# Patient Record
Sex: FEMALE | Race: BLACK OR AFRICAN AMERICAN | Employment: OTHER | URBAN - METROPOLITAN AREA
[De-identification: names, ages, dates, MRNs, and addresses within clinical notes are randomized per-mention and may not be internally consistent; named-entity substitution may affect disease eponyms.]

---

## 2017-06-29 ENCOUNTER — APPOINTMENT (OUTPATIENT)
Dept: CT IMAGING | Age: 72
End: 2017-06-29
Attending: EMERGENCY MEDICINE
Payer: MEDICARE

## 2017-06-29 ENCOUNTER — HOSPITAL ENCOUNTER (EMERGENCY)
Age: 72
Discharge: HOME | End: 2017-06-29
Attending: EMERGENCY MEDICINE
Payer: MEDICARE

## 2017-06-29 ENCOUNTER — APPOINTMENT (OUTPATIENT)
Dept: GENERAL RADIOLOGY | Age: 72
End: 2017-06-29
Attending: EMERGENCY MEDICINE
Payer: MEDICARE

## 2017-06-29 VITALS
SYSTOLIC BLOOD PRESSURE: 157 MMHG | TEMPERATURE: 97.8 F | RESPIRATION RATE: 20 BRPM | DIASTOLIC BLOOD PRESSURE: 86 MMHG | HEART RATE: 72 BPM | OXYGEN SATURATION: 98 %

## 2017-06-29 DIAGNOSIS — R06.4 HYPERVENTILATION: Primary | ICD-10-CM

## 2017-06-29 LAB
ALBUMIN SERPL BCP-MCNC: 3.8 G/DL (ref 3.4–5)
ALBUMIN/GLOB SERPL: 0.9 {RATIO} (ref 0.8–1.7)
ALP SERPL-CCNC: 62 U/L (ref 45–117)
ALT SERPL-CCNC: 14 U/L (ref 13–56)
ANION GAP BLD CALC-SCNC: 12 MMOL/L (ref 3–18)
APPEARANCE UR: CLEAR
APTT PPP: 30.9 SEC (ref 23–36.4)
ARTERIAL PATENCY WRIST A: YES
AST SERPL W P-5'-P-CCNC: 16 U/L (ref 15–37)
ATRIAL RATE: 47 BPM
BACTERIA URNS QL MICRO: ABNORMAL /HPF
BASE DEFICIT BLD-SCNC: 5 MMOL/L
BASOPHILS # BLD AUTO: 0 K/UL (ref 0–0.06)
BASOPHILS # BLD: 1 % (ref 0–2)
BDY SITE: ABNORMAL
BILIRUB SERPL-MCNC: 0.4 MG/DL (ref 0.2–1)
BILIRUB UR QL: NEGATIVE
BNP SERPL-MCNC: 21 PG/ML (ref 0–900)
BUN SERPL-MCNC: 17 MG/DL (ref 7–18)
BUN/CREAT SERPL: 21 (ref 12–20)
CALCIUM SERPL-MCNC: 9.4 MG/DL (ref 8.5–10.1)
CALCULATED R AXIS, ECG10: 2 DEGREES
CALCULATED T AXIS, ECG11: 18 DEGREES
CHLORIDE SERPL-SCNC: 106 MMOL/L (ref 100–108)
CK MB CFR SERPL CALC: NORMAL % (ref 0–4)
CK MB SERPL-MCNC: <1 NG/ML (ref 5–25)
CK SERPL-CCNC: 84 U/L (ref 26–192)
CO2 SERPL-SCNC: 22 MMOL/L (ref 21–32)
COLOR UR: YELLOW
CREAT SERPL-MCNC: 0.81 MG/DL (ref 0.6–1.3)
D DIMER PPP FEU-MCNC: 0.95 UG/ML(FEU)
DIAGNOSIS, 93000: NORMAL
DIFFERENTIAL METHOD BLD: ABNORMAL
EOSINOPHIL # BLD: 0.3 K/UL (ref 0–0.4)
EOSINOPHIL NFR BLD: 5 % (ref 0–5)
EPITH CASTS URNS QL MICRO: ABNORMAL /LPF (ref 0–5)
ERYTHROCYTE [DISTWIDTH] IN BLOOD BY AUTOMATED COUNT: 14.8 % (ref 11.6–14.5)
GAS FLOW.O2 O2 DELIVERY SYS: ABNORMAL L/MIN
GLOBULIN SER CALC-MCNC: 4.2 G/DL (ref 2–4)
GLUCOSE SERPL-MCNC: 80 MG/DL (ref 74–99)
GLUCOSE UR STRIP.AUTO-MCNC: NEGATIVE MG/DL
HCO3 BLD-SCNC: 17.7 MMOL/L (ref 22–26)
HCT VFR BLD AUTO: 38.4 % (ref 35–45)
HGB BLD-MCNC: 12.8 G/DL (ref 12–16)
HGB UR QL STRIP: NEGATIVE
INR PPP: 1 (ref 0.8–1.2)
KETONES UR QL STRIP.AUTO: NEGATIVE MG/DL
LEUKOCYTE ESTERASE UR QL STRIP.AUTO: ABNORMAL
LIPASE SERPL-CCNC: 191 U/L (ref 73–393)
LYMPHOCYTES # BLD AUTO: 31 % (ref 21–52)
LYMPHOCYTES # BLD: 2 K/UL (ref 0.9–3.6)
MCH RBC QN AUTO: 28.4 PG (ref 24–34)
MCHC RBC AUTO-ENTMCNC: 33.3 G/DL (ref 31–37)
MCV RBC AUTO: 85.3 FL (ref 74–97)
MONOCYTES # BLD: 0.6 K/UL (ref 0.05–1.2)
MONOCYTES NFR BLD AUTO: 9 % (ref 3–10)
NEUTS SEG # BLD: 3.6 K/UL (ref 1.8–8)
NEUTS SEG NFR BLD AUTO: 54 % (ref 40–73)
NITRITE UR QL STRIP.AUTO: NEGATIVE
O2/TOTAL GAS SETTING VFR VENT: 21 %
PCO2 BLD: 22 MMHG (ref 35–45)
PH BLD: 7.51 [PH] (ref 7.35–7.45)
PH UR STRIP: 6.5 [PH] (ref 5–8)
PLATELET # BLD AUTO: 250 K/UL (ref 135–420)
PMV BLD AUTO: 10.3 FL (ref 9.2–11.8)
PO2 BLD: 115 MMHG (ref 80–100)
POTASSIUM SERPL-SCNC: 4.2 MMOL/L (ref 3.5–5.5)
PROT SERPL-MCNC: 8 G/DL (ref 6.4–8.2)
PROT UR STRIP-MCNC: NEGATIVE MG/DL
PROTHROMBIN TIME: 13.2 SEC (ref 11.5–15.2)
Q-T INTERVAL, ECG07: 496 MS
QRS DURATION, ECG06: 72 MS
QTC CALCULATION (BEZET), ECG08: 434 MS
RBC # BLD AUTO: 4.5 M/UL (ref 4.2–5.3)
RBC #/AREA URNS HPF: NEGATIVE /HPF (ref 0–5)
SAO2 % BLD: 99 % (ref 92–97)
SERVICE CMNT-IMP: ABNORMAL
SODIUM SERPL-SCNC: 140 MMOL/L (ref 136–145)
SP GR UR REFRACTOMETRY: <1.005 (ref 1–1.03)
SPECIMEN TYPE: ABNORMAL
TOTAL RESP. RATE, ITRR: 12
TROPONIN I SERPL-MCNC: <0.02 NG/ML (ref 0–0.06)
UROBILINOGEN UR QL STRIP.AUTO: 0.2 EU/DL (ref 0.2–1)
VENTRICULAR RATE, ECG03: 46 BPM
WBC # BLD AUTO: 6.5 K/UL (ref 4.6–13.2)
WBC URNS QL MICRO: ABNORMAL /HPF (ref 0–5)

## 2017-06-29 PROCEDURE — 71275 CT ANGIOGRAPHY CHEST: CPT

## 2017-06-29 PROCEDURE — 85379 FIBRIN DEGRADATION QUANT: CPT | Performed by: EMERGENCY MEDICINE

## 2017-06-29 PROCEDURE — 96374 THER/PROPH/DIAG INJ IV PUSH: CPT

## 2017-06-29 PROCEDURE — 74011250636 HC RX REV CODE- 250/636

## 2017-06-29 PROCEDURE — 81001 URINALYSIS AUTO W/SCOPE: CPT | Performed by: EMERGENCY MEDICINE

## 2017-06-29 PROCEDURE — 36600 WITHDRAWAL OF ARTERIAL BLOOD: CPT

## 2017-06-29 PROCEDURE — 85025 COMPLETE CBC W/AUTO DIFF WBC: CPT | Performed by: EMERGENCY MEDICINE

## 2017-06-29 PROCEDURE — 96375 TX/PRO/DX INJ NEW DRUG ADDON: CPT

## 2017-06-29 PROCEDURE — 82550 ASSAY OF CK (CPK): CPT | Performed by: EMERGENCY MEDICINE

## 2017-06-29 PROCEDURE — 93005 ELECTROCARDIOGRAM TRACING: CPT

## 2017-06-29 PROCEDURE — 80053 COMPREHEN METABOLIC PANEL: CPT | Performed by: EMERGENCY MEDICINE

## 2017-06-29 PROCEDURE — 85610 PROTHROMBIN TIME: CPT | Performed by: EMERGENCY MEDICINE

## 2017-06-29 PROCEDURE — 85730 THROMBOPLASTIN TIME PARTIAL: CPT | Performed by: EMERGENCY MEDICINE

## 2017-06-29 PROCEDURE — 83880 ASSAY OF NATRIURETIC PEPTIDE: CPT | Performed by: EMERGENCY MEDICINE

## 2017-06-29 PROCEDURE — 83690 ASSAY OF LIPASE: CPT | Performed by: EMERGENCY MEDICINE

## 2017-06-29 PROCEDURE — 96376 TX/PRO/DX INJ SAME DRUG ADON: CPT

## 2017-06-29 PROCEDURE — 74011250636 HC RX REV CODE- 250/636: Performed by: EMERGENCY MEDICINE

## 2017-06-29 PROCEDURE — 71010 XR CHEST PORT: CPT

## 2017-06-29 PROCEDURE — 99285 EMERGENCY DEPT VISIT HI MDM: CPT

## 2017-06-29 PROCEDURE — 82803 BLOOD GASES ANY COMBINATION: CPT

## 2017-06-29 PROCEDURE — 74011636320 HC RX REV CODE- 636/320: Performed by: EMERGENCY MEDICINE

## 2017-06-29 RX ORDER — SODIUM CHLORIDE 0.9 % (FLUSH) 0.9 %
5-10 SYRINGE (ML) INJECTION AS NEEDED
Status: DISCONTINUED | OUTPATIENT
Start: 2017-06-29 | End: 2017-06-30 | Stop reason: HOSPADM

## 2017-06-29 RX ORDER — LORAZEPAM 2 MG/ML
0.5 INJECTION INTRAMUSCULAR ONCE
Status: COMPLETED | OUTPATIENT
Start: 2017-06-29 | End: 2017-06-29

## 2017-06-29 RX ORDER — KETOROLAC TROMETHAMINE 30 MG/ML
15 INJECTION, SOLUTION INTRAMUSCULAR; INTRAVENOUS
Status: COMPLETED | OUTPATIENT
Start: 2017-06-29 | End: 2017-06-29

## 2017-06-29 RX ORDER — SODIUM CHLORIDE 0.9 % (FLUSH) 0.9 %
5-10 SYRINGE (ML) INJECTION EVERY 8 HOURS
Status: DISCONTINUED | OUTPATIENT
Start: 2017-06-29 | End: 2017-06-30 | Stop reason: HOSPADM

## 2017-06-29 RX ORDER — LORAZEPAM 2 MG/ML
1 INJECTION INTRAMUSCULAR
Status: COMPLETED | OUTPATIENT
Start: 2017-06-29 | End: 2017-06-29

## 2017-06-29 RX ORDER — LORAZEPAM 2 MG/ML
0.5 INJECTION INTRAMUSCULAR
Status: DISCONTINUED | OUTPATIENT
Start: 2017-06-29 | End: 2017-06-29

## 2017-06-29 RX ORDER — ALPRAZOLAM 0.25 MG/1
0.25 TABLET ORAL
Qty: 8 TAB | Refills: 0 | Status: SHIPPED | OUTPATIENT
Start: 2017-06-29 | End: 2021-09-15

## 2017-06-29 RX ORDER — LORAZEPAM 2 MG/ML
INJECTION INTRAMUSCULAR
Status: COMPLETED
Start: 2017-06-29 | End: 2017-06-29

## 2017-06-29 RX ADMIN — KETOROLAC TROMETHAMINE 15 MG: 30 INJECTION, SOLUTION INTRAMUSCULAR at 19:24

## 2017-06-29 RX ADMIN — LORAZEPAM 0.5 MG: 2 INJECTION INTRAMUSCULAR; INTRAVENOUS at 16:56

## 2017-06-29 RX ADMIN — LORAZEPAM 1 MG: 2 INJECTION INTRAMUSCULAR; INTRAVENOUS at 19:24

## 2017-06-29 RX ADMIN — LORAZEPAM 0.5 MG: 2 INJECTION INTRAMUSCULAR at 16:56

## 2017-06-29 RX ADMIN — Medication 10 ML: at 16:57

## 2017-06-29 RX ADMIN — IOPAMIDOL 100 ML: 755 INJECTION, SOLUTION INTRAVENOUS at 20:41

## 2017-06-29 NOTE — ED NOTES
Report received from [facility] assumed care of pt. Pt resting comfortably in stretcher with daughter at bedside. Updated pt on plan of care awaiting test results. Pt verbalized understanding, warm blankets provided to pt for comfort, bed in lowest lock position, side rails up x2, call bell in reach of pt and pt instructed to use call bell for assistance.

## 2017-06-29 NOTE — ED PROVIDER NOTES
[address]  EMERGENCY DEPARTMENT HISTORY AND PHYSICAL EXAM       Date: 6/29/2017   Patient Name: Adam Jones   YOB: 1945  Medical Record Number: [id number]    History of Presenting Illness     Chief Complaint   Patient presents with    Shortness of Breath        History Provided By:  patient    Additional History:   4:40 PM  Adam Jones is a 80 y.o. female with PMHX COPD, asthma, and arthritis presenting to the ED C/O intermittent SOB x1 month (O2 sats in ED 93 % on RA), onset 1 month ago. Associated sxs include intermittent sternal CP and LUQ pain (each episode lasts 2-3 minutes) x1 month. PSHX hernia repair, appendectomy, cholecystectomy. Pt states she has been out of her COPD medications x1 week because her doctor in [place] (who she saw 1 month ago while visiting her granddaughter) said \"I don't need them no more. \" Pt denies PMHX DM, HTN, leg pain, and any other symptoms or complaints. Primary Care Provider: Rosalva Scott MD   Specialist:    Past History     Past Medical History:   Past Medical History:   Diagnosis Date    Asthma     Chronic obstructive pulmonary disease ([facility] [address]     GERD (gastroesophageal reflux disease)     Hypertension     4-2015- no meds    Ill-defined condition     seasonal allergies    [facility]rheumatoid [facility]) ([facility])         Past Surgical History:   Past Surgical History:   Procedure Laterality Date    HX APPENDECTOMY      HX CATARACT REMOVAL Bilateral     HX CHOLECYSTECTOMY      HX HERNIA REPAIR      ventral    HX MOHS PROCEDURES Right     HX ORTHOPAEDIC Bilateral     knee replacement    HX TONSILLECTOMY          Family History:   History reviewed. No pertinent family history. Social History:   Social History   Substance Use Topics    Smoking status: Never Smoker    Smokeless tobacco: Never Used    Alcohol use No        Allergies:    Allergies   Allergen Reactions    Latex Anaphylaxis    Aspirin Anaphylaxis Patient has tolerated with NSAIDS    Sulfa (Sulfonamide Antibiotics) Anaphylaxis    Egg Swelling     Eye swelling    Iodinated Contrast- Oral And Iv Dye Unknown (comments)    Pcn [Penicillins] Swelling        Review of Systems   Review of Systems   Respiratory: Positive for shortness of breath. Cardiovascular: Positive for chest pain (sternal). Gastrointestinal: Positive for abdominal pain (LUQ). Musculoskeletal: Negative for myalgias (BLE). All other systems reviewed and are negative. Physical Exam  Vitals:    06/29/17 1730 06/29/17 1903 06/29/17 1940 06/29/17 1941   BP: 149/77 157/86     Pulse: 61 (!) 50 66 61   Resp: 26 13 19 23   Temp:       SpO2:   94% 98%       Physical Exam   Constitutional: She is oriented to person, place, and time. She appears well-developed and well-nourished. Elderly female, appears to be hyperventilating   HENT:   Head: Normocephalic and atraumatic. Eyes: Pupils are equal, round, and reactive to light. Neck: Neck supple. Cardiovascular: Regular rhythm, S1 normal, S2 normal and normal heart sounds. Bradycardia present. S1-S2 normal but sometimes bradycardic during exam   Pulmonary/Chest: Breath sounds normal. No respiratory distress. She has no wheezes. She has no rales. She exhibits no tenderness. Abdominal: Soft. She exhibits no distension and no mass. There is tenderness in the left upper quadrant. There is no rebound and no guarding. Musculoskeletal: Normal range of motion. She exhibits no edema or tenderness. Neurological: She is alert and oriented to person, place, and time. No cranial nerve deficit. Skin: No rash noted. Psychiatric: She has a normal mood and affect. Her behavior is normal. Thought content normal.   Nursing note and vitals reviewed.        Diagnostic Study Results     Labs -      Recent Results (from the past 12 hour(s))   CBC WITH AUTOMATED DIFF    Collection Time: 06/29/17  4:50 PM   Result Value Ref Range    WBC 6.5 4.6 - 13.2 K/uL    RBC 4.50 4. 20 - 5.30 M/uL    HGB 12.8 12.0 - 16.0 g/dL    HCT 38.4 35.0 - 45.0 %    MCV 85.3 74.0 - 97.0 FL    MCH 28.4 24.0 - 34.0 PG    MCHC 33.3 31.0 - 37.0 g/dL    RDW 14.8 (H) 11.6 - 14.5 %    PLATELET 095 267 - 882 K/uL    MPV 10.3 9.2 - 11.8 FL    NEUTROPHILS 54 40 - 73 %    LYMPHOCYTES 31 21 - 52 %    MONOCYTES 9 3 - 10 %    EOSINOPHILS 5 0 - 5 %    BASOPHILS 1 0 - 2 %    ABS. NEUTROPHILS 3.6 1.8 - 8.0 K/UL    ABS. LYMPHOCYTES 2.0 0.9 - 3.6 K/UL    ABS. MONOCYTES 0.6 0.05 - 1.2 K/UL    ABS. EOSINOPHILS 0.3 0.0 - 0.4 K/UL    ABS. BASOPHILS 0.0 0.0 - 0.06 K/UL    DF AUTOMATED     EKG, 12 LEAD, INITIAL    Collection Time: 06/29/17  4:52 PM   Result Value Ref Range    Ventricular Rate 46 BPM    Atrial Rate 47 BPM    QRS Duration 72 ms    Q-T Interval 496 ms    QTC Calculation (Bezet) 434 ms    Calculated R Axis 2 degrees    Calculated T Axis 18 degrees    Diagnosis       Poor data quality, interpretation may be adversely affected  Sinus rhythm  Voltage criteria for left ventricular hypertrophy  Nonspecific ST and T wave abnormality  Abnormal ECG  When compared with ECG of 22-JUN-2015 18:12,  Nonspecific T wave abnormality now evident in Lateral leads  Confirmed by Kamron Green MD. ([id number]) on 6/29/2017 6:09:26 PM     POC G3    Collection Time: 06/29/17  5:19 PM   Result Value Ref Range    Device: ROOM AIR      FIO2 (POC) 21 %    pH (POC) 7.513 (H) 7.35 - 7.45      pCO2 (POC) 22.0 (L) 35.0 - 45.0 MMHG    pO2 (POC) 115 (H) 80 - 100 MMHG    HCO3 (POC) 17.7 (L) 22 - 26 MMOL/L    sO2 (POC) 99 (H) 92 - 97 %    Base deficit (POC) 5 mmol/L    Allens test (POC) YES      Total resp.  rate 12      Site RIGHT BRACHIAL      Specimen type (POC) ARTERIAL      Performed by [address], COMPREHENSIVE    Collection Time: 06/29/17  5:50 PM   Result Value Ref Range    Sodium 140 136 - 145 mmol/L    Potassium 4.2 3.5 - 5.5 mmol/L    Chloride 106 100 - 108 mmol/L    CO2 22 21 - 32 mmol/L    Anion gap 12 3.0 - 18 mmol/L    Glucose 80 74 - 99 mg/dL    BUN 17 7.0 - 18 MG/DL    Creatinine 0.81 0.6 - 1.3 MG/DL    BUN/Creatinine ratio 21 (H) 12 - 20      GFR est AA >60 >60 ml/min/1.73m2    GFR est non-AA >60 >60 ml/min/1.73m2    Calcium 9.4 8.5 - 10.1 MG/DL    Bilirubin, total 0.4 0.2 - 1.0 MG/DL    ALT (SGPT) 14 13 - 56 U/L    AST (SGOT) 16 15 - 37 U/L    Alk.  phosphatase 62 45 - 117 U/L    Protein, total 8.0 6.4 - 8.2 g/dL    Albumin 3.8 3.4 - 5.0 g/dL    Globulin 4.2 (H) 2.0 - 4.0 g/dL    A-G Ratio 0.9 0.8 - 1.7     LIPASE    Collection Time: 06/29/17  5:50 PM   Result Value Ref Range    Lipase 191 73 - 393 U/L   PTT    Collection Time: 06/29/17  5:50 PM   Result Value Ref Range    aPTT 30.9 23.0 - 36.4 SEC   PROTHROMBIN TIME + INR    Collection Time: 06/29/17  5:50 PM   Result Value Ref Range    Prothrombin time 13.2 11.5 - 15.2 sec    INR 1.0 0.8 - 1.2     CARDIAC PANEL,(CK, CKMB & TROPONIN)    Collection Time: 06/29/17  5:50 PM   Result Value Ref Range    CK 84 26 - 192 U/L    CK - MB <1.0 <3.6 ng/ml    CK-MB Index  0.0 - 4.0 %     CALCULATION NOT PERFORMED WHEN RESULT IS BELOW LINEAR LIMIT    Troponin-I, Qt. <0.02 0.00 - 0.06 NG/ML   PRO-BNP    Collection Time: 06/29/17  5:50 PM   Result Value Ref Range    NT pro-BNP 21 0 - 900 PG/ML   D DIMER    Collection Time: 06/29/17  5:50 PM   Result Value Ref Range    D DIMER 0.95 (H) <0.46 ug/ml(FEU)   URINALYSIS W/ RFLX MICROSCOPIC    Collection Time: 06/29/17  6:55 PM   Result Value Ref Range    Color YELLOW      Appearance CLEAR      Specific gravity <1.005 (L) 1.005 - 1.030    pH (UA) 6.5 5.0 - 8.0      Protein NEGATIVE  NEG mg/dL    Glucose NEGATIVE  NEG mg/dL    Ketone NEGATIVE  NEG mg/dL    Bilirubin NEGATIVE  NEG      Blood NEGATIVE  NEG      Urobilinogen 0.2 0.2 - 1.0 EU/dL    Nitrites NEGATIVE  NEG      Leukocyte Esterase TRACE (A) NEG     URINE MICROSCOPIC ONLY    Collection Time: 06/29/17  6:55 PM   Result Value Ref Range    WBC 0 to 1 0 - 5 /hpf    RBC NEGATIVE  0 - 5 /hpf    Epithelial cells FEW 0 - 5 /lpf    Bacteria FEW (A) NEG /hpf       Radiologic Studies -    5:19 PM  RADIOLOGY FINDINGS  Chest X-ray shows NAP  Pending review by Radiologist  Recorded by Maddi Crawford ED Scribe, as dictated by [employer], MD      CTA CHEST W OR W WO CONT   Final Result   1. No central pulmonary embolism however evaluation for peripheral pulmonary  emboli to the lower lobes is limited due to breathing motion artifacts     2. No aortic dissection     There is intra and extrahepatic bile duct dilatation with dilatation of the  pancreatic duct. This area is incompletely evaluated. Dedicated CT of the  abdomen recommended for further evaluation    As read by the radiologist.      XR CHEST PORT    (Results Pending)        Medical Decision Making   I am the first provider for this patient. I reviewed the vital signs, available nursing notes, past medical history, past surgical history, family history and social history. Vital Signs-Reviewed the patient's vital signs. Patient Vitals for the past 12 hrs:   Temp Pulse Resp BP SpO2   06/29/17 1941 - 61 23 - 98 %   06/29/17 1940 - 66 19 - 94 %   06/29/17 1903 - (!) 50 13 157/86 -   06/29/17 1730 - 61 26 149/77 -   06/29/17 1715 - (!) 49 16 146/78 -   06/29/17 1638 97.8 °F (36.6 °C) (!) 55 14 (!) 150/99 -       DDX: SOB and CP r/o hyperventilation, PE, CHF, COPD exacerbation    Pulse Oximetry Analysis - Abnormal 88% on RA. Will put pt on 2L O2 NC. Cardiac Monitor:   Rate: 53 bpm  Rhythm: sinus bradycardia     EKG interpretation: (Preliminary)  4:52 PM  46 bpm, sinus bradycardia, voltage criteria for left ventricular hypertrophy, nonspecific ST and T wave abnormality  EKG read by Susan Servin MD at 4:52 PM     ED Course:     4:40 PM Initial assessment performed. 7:07 PM Pt very fidgety, still hyperventilating. States she has arthritis and fibromyalgia and is asking for something for pain. Will give Toradol. Medications Given in the ED:  Medications   sodium chloride (NS) flush 5-10 mL (10 mL IntraVENous Given 6/29/17 1657)   sodium chloride (NS) flush 5-10 mL (not administered)   LORazepam (ATIVAN) injection 0.5 mg (0.5 mg IntraVENous Given 6/29/17 1656)   LORazepam (ATIVAN) injection 1 mg (1 mg IntraVENous Given 6/29/17 1924)   ketorolac (TORADOL) injection 15 mg (15 mg IntraVENous Given 6/29/17 1924)   iopamidol (ISOVUE-370) 76 % injection 100 mL (100 mL IntraVENous Given 6/29/17 2041)        Discharge Note:  9:32 PM  Patients results have been reviewed with them. Patient and/or family have verbally conveyed their understanding and agreement of the patient's signs, symptoms, diagnosis, treatment and prognosis and additionally agree to follow up as recommended or return to the Emergency Room should their condition change prior to their follow-up appointment. Patient verbally agrees with the care-plan and verbally conveys that all of their questions have been answered. Discharge instructions have also been provided to the patient with some educational information regarding their diagnosis as well a list of reasons why they would want to return to the ER prior to their follow-up appointment should their condition change. Diagnosis   Clinical Impression:   1. Hyperventilation         Follow-up Information     Follow up With Details Comments Contact Info    Luz Maria Beyer MD Schedule an appointment as soon as possible for a visit in 2 days  [address] [place] [address]      THE [facility] EMERGENCY DEPT  As needed, If symptoms worsen [address] [place]ma Xavier  [phone]          Current Discharge Medication List      START taking these medications    Details   ALPRAZolam (XANAX) 0.25 mg tablet Take 1 Tab by mouth every eight (8) hours as needed for Anxiety. Max Daily Amount: 0.75 mg.   Qty: 8 Tab, Refills: 0         CONTINUE these medications which have NOT CHANGED    Details   esomeprazole (NEXIUM) 20 mg capsule Take 20 mg by mouth daily. Indications: GASTROESOPHAGEAL REFLUX      clonazePAM (KLONOPIN) 0.5 mg tablet Take 0.5 mg by mouth daily. Indications: anxiety      fexofenadine (ALLEGRA) 180 mg tablet Take 180 mg by mouth daily. Indications: ALLERGIC RHINITIS             _______________________________   Attestations:     SCRIBE ATTESTATION:  This note is prepared by Ann Marie Herman, acting as Scribe for Bry Jon MD.    PROVIDER ATTESTATION:  Bry Jon MD: The scribe's documentation has been prepared under my direction and personally reviewed by me in its entirety.  I confirm that the note above accurately reflects all work, treatment, procedures, and medical decision making performed by me.   _______________________________

## 2017-06-30 NOTE — ED NOTES
Pt returned for CT, pt resting comfortably on stretcher with daughter at bedside. Warm blankets provided for comfort. Bed in lowest locked position, side rails up x 2 and call bell in reach of patient and patient instructed to use call bell for any assistance needed. Pt remains on cardiac monitor and will continue to monitor.

## 2017-06-30 NOTE — ED NOTES
Pt was discharged in good and improved condition. The patient's diagnosis, condition and treatment were explained to patient and aftercare instructions were given. The patient verbalized good understanding. Patient armband removed and both labels and armband were placed in shred bin. Patient left ER ambulatory with steady gait in no acute distress. 1 prescriptions provided. Patient reports pain is currently 0 on numeric scale. Patient provided education about pain medication including dosage and side effects. Patient verbalized understanding. Ride daughter at bedside to provide transportation home.

## 2017-06-30 NOTE — DISCHARGE INSTRUCTIONS
Hyperventilation: Care Instructions  Your Care Instructions  Hyperventilation is breathing that is deeper and faster than normal. It causes the amount of carbon dioxide (CO2) in the blood to drop. This may make you feel lightheaded. You may also have a fast heartbeat and feel short of breath. It also can lead to numbness or tingling in the hands or feet, anxiety, fainting, and sore chest muscles. Some causes of sudden hyperventilation include anxiety, asthma, emphysema, a head injury, fever, and some medicines. Hyperventilation also may be caused by a pattern of incorrect breathing. It most often happens when a physical or emotional event makes this breathing pattern worse. Hyperventilation may happen during pregnancy. But it usually goes away on its own after delivery. In many cases, hyperventilation can be controlled by learning proper breathing techniques. Follow-up care is a key part of your treatment and safety. Be sure to make and go to all appointments, and call your doctor if you are having problems. It's also a good idea to know your test results and keep a list of the medicines you take. How can you care for yourself at home? Breathing methods  · Breathe through pursed lips, as if you are whistling. Or pinch one nostril and breathe through your nose. It is harder to hyperventilate through your nose or through pursed lips because you can't move as much air. · Slow your breathing to 1 breath every 5 seconds, or slow enough that symptoms gradually go away. · Try belly-breathing. This fills your lungs fully, slows your breathing rate, and helps you relax. ¨ Place one hand on your belly just below the ribs. Place the other hand on your chest. You can do this while standing, but it may be more comfortable while you lie on the floor with your knees bent. ¨ Take a deep breath through your nose. As you breathe in, let your belly push your hand out. Keep your chest still.   ¨ As you breathe out through pursed lips, feel your hand go down. Use the hand on your belly to help you push all the air out. Take your time breathing out. ¨ Repeat these steps 3 to 10 times. Take your time with each breath. Always try to control your breathing or to belly-breathe first. If these techniques don't work and you don't have other health problems, you might try breathing in and out of a paper bag. Using a paper bag  · Take 6 to 12 easy, natural breaths, with a small paper bag held over your mouth and nose. Then remove the bag from your nose and mouth, and take easy, natural breaths. · Next, try belly-breathing. · Switch between these techniques until your hyperventilation stops. Do not try this method if:  · You have any heart or lung problems. · Rapid breathing happens at a high altitude. Breathing faster than normal is a natural response to high altitude. Follow these safety measures when using this method:  · Do not use a plastic bag. · Do not breathe continuously into a paper bag. Take 6 to 12 natural breaths with a paper bag held over your mouth and nose. Then remove the bag from your nose and mouth. · Do not hold the bag for a person who is hyperventilating. Let the person hold the bag over his or her own mouth and nose. When should you call for help? Call 911 anytime you think you may need emergency care. For example, call if:  · You passed out (lost consciousness). Call your doctor now or seek immediate medical care if:  · You hyperventilate for longer than 30 minutes. · You hyperventilate often. · Your symptoms do not improve with home treatment. · Your symptoms become more severe or more frequent. Watch closely for changes in your health, and be sure to contact your doctor if you have any problems. Where can you learn more? Go to http://lida-caroline.info/. Enter I070 in the search box to learn more about \"Hyperventilation: Care Instructions. \"  Current as of: March 20, 2017  Content Version: 11.3  © 4379-8405 [employer]. Care instructions adapted under license by [employer] (which disclaims liability or warranty for this information). If you have questions about a medical condition or this instruction, always ask your healthcare professional. [address] any warranty or liability for your use of this information.

## 2021-01-21 ENCOUNTER — HOSPITAL ENCOUNTER (OUTPATIENT)
Dept: PREADMISSION TESTING | Age: 76
Discharge: HOME | End: 2021-01-21
Payer: MEDICARE

## 2021-01-21 PROCEDURE — U0003 INFECTIOUS AGENT DETECTION BY NUCLEIC ACID (DNA OR RNA); SEVERE ACUTE RESPIRATORY SYNDROME CORONAVIRUS 2 (SARS-COV-2) (CORONAVIRUS DISEASE [COVID-19]), AMPLIFIED PROBE TECHNIQUE, MAKING USE OF HIGH THROUGHPUT TECHNOLOGIES AS DESCRIBED BY CMS-2020-01-R: HCPCS

## 2021-01-22 LAB — SARS-COV-2, COV2NT: NOT DETECTED

## 2021-01-25 ENCOUNTER — HOSPITAL ENCOUNTER (OUTPATIENT)
Age: 76
Setting detail: OUTPATIENT SURGERY
Discharge: HOME | End: 2021-01-25
Attending: INTERNAL MEDICINE | Admitting: INTERNAL MEDICINE
Payer: MEDICARE

## 2021-01-25 VITALS
RESPIRATION RATE: 16 BRPM | SYSTOLIC BLOOD PRESSURE: 128 MMHG | HEIGHT: 65 IN | BODY MASS INDEX: 18.33 KG/M2 | WEIGHT: 110 LBS | HEART RATE: 58 BPM | OXYGEN SATURATION: 99 % | TEMPERATURE: 97.6 F | DIASTOLIC BLOOD PRESSURE: 69 MMHG

## 2021-01-25 PROCEDURE — 76040000008: Performed by: INTERNAL MEDICINE

## 2021-01-25 PROCEDURE — 77030039961 HC KT CUST COLON BSC -D: Performed by: INTERNAL MEDICINE

## 2021-01-25 PROCEDURE — 99153 MOD SED SAME PHYS/QHP EA: CPT | Performed by: INTERNAL MEDICINE

## 2021-01-25 PROCEDURE — 77030040361 HC SLV COMPR DVT MDII -B: Performed by: INTERNAL MEDICINE

## 2021-01-25 PROCEDURE — 77030013991 HC SNR POLYP ENDOSC BSC -A: Performed by: INTERNAL MEDICINE

## 2021-01-25 PROCEDURE — 2709999900 HC NON-CHARGEABLE SUPPLY: Performed by: INTERNAL MEDICINE

## 2021-01-25 PROCEDURE — 74011250636 HC RX REV CODE- 250/636: Performed by: INTERNAL MEDICINE

## 2021-01-25 PROCEDURE — 88305 TISSUE EXAM BY PATHOLOGIST: CPT

## 2021-01-25 PROCEDURE — G0500 MOD SEDAT ENDO SERVICE >5YRS: HCPCS | Performed by: INTERNAL MEDICINE

## 2021-01-25 RX ORDER — SODIUM CHLORIDE 0.9 % (FLUSH) 0.9 %
5-40 SYRINGE (ML) INJECTION EVERY 8 HOURS
Status: CANCELLED | OUTPATIENT
Start: 2021-01-25

## 2021-01-25 RX ORDER — ATROPINE SULFATE 0.1 MG/ML
0.5 INJECTION INTRAVENOUS
Status: CANCELLED | OUTPATIENT
Start: 2021-01-25 | End: 2021-01-26

## 2021-01-25 RX ORDER — MIDAZOLAM HYDROCHLORIDE 1 MG/ML
.25-5 INJECTION, SOLUTION INTRAMUSCULAR; INTRAVENOUS
Status: DISCONTINUED | OUTPATIENT
Start: 2021-01-25 | End: 2021-01-25 | Stop reason: HOSPADM

## 2021-01-25 RX ORDER — EPINEPHRINE 0.1 MG/ML
1 INJECTION INTRACARDIAC; INTRAVENOUS
Status: CANCELLED | OUTPATIENT
Start: 2021-01-25 | End: 2021-01-26

## 2021-01-25 RX ORDER — DIPHENHYDRAMINE HYDROCHLORIDE 50 MG/ML
50 INJECTION, SOLUTION INTRAMUSCULAR; INTRAVENOUS ONCE
Status: CANCELLED | OUTPATIENT
Start: 2021-01-25 | End: 2021-01-25

## 2021-01-25 RX ORDER — FENTANYL CITRATE 50 UG/ML
100 INJECTION, SOLUTION INTRAMUSCULAR; INTRAVENOUS
Status: DISCONTINUED | OUTPATIENT
Start: 2021-01-25 | End: 2021-01-25 | Stop reason: HOSPADM

## 2021-01-25 RX ORDER — FLUMAZENIL 0.1 MG/ML
0.2 INJECTION INTRAVENOUS
Status: CANCELLED | OUTPATIENT
Start: 2021-01-25 | End: 2021-01-25

## 2021-01-25 RX ORDER — NALOXONE HYDROCHLORIDE 0.4 MG/ML
0.4 INJECTION, SOLUTION INTRAMUSCULAR; INTRAVENOUS; SUBCUTANEOUS
Status: DISCONTINUED | OUTPATIENT
Start: 2021-01-25 | End: 2021-01-25 | Stop reason: HOSPADM

## 2021-01-25 RX ORDER — SODIUM CHLORIDE 0.9 % (FLUSH) 0.9 %
5-40 SYRINGE (ML) INJECTION AS NEEDED
Status: CANCELLED | OUTPATIENT
Start: 2021-01-25

## 2021-01-25 RX ORDER — SODIUM CHLORIDE 9 MG/ML
1000 INJECTION, SOLUTION INTRAVENOUS CONTINUOUS
Status: DISCONTINUED | OUTPATIENT
Start: 2021-01-25 | End: 2021-01-25 | Stop reason: HOSPADM

## 2021-01-25 RX ORDER — DEXTROMETHORPHAN/PSEUDOEPHED 2.5-7.5/.8
1.2 DROPS ORAL
Status: CANCELLED | OUTPATIENT
Start: 2021-01-25

## 2021-01-25 RX ADMIN — SODIUM CHLORIDE 1000 ML: 900 INJECTION, SOLUTION INTRAVENOUS at 13:32

## 2021-01-25 NOTE — PROCEDURES
[facility]  Colonoscopy Procedure Report  _______________________________________________________  Patient: Rafael Donald                                        Attending Physician: Royce Hu MD    Patient ID: [id number]                                    Referring Physician: Ellen De Oliveira MD    Exam Date: 1/25/2021      Introduction: A  77 y.o. female patient, presents for inpatient Colonoscopy    Indications:  lost 45 lbs in 6 months not sure why. she claims that she didn't have much appetite  she was having for one month frequent epigastric pain but resolved after taking omeprazole. She claims that she used to have intermittent heartburns. No dysphagia. had an EGD about 2 years ago in [place] it was negative. She had a negative colonoscopy 13 years ago. She has one bm every 2 days. She appendectomy, hernia repair after 3 deliveries. cholecystectomy, right rotator cuff surgery, bilateral knee replacement  No Fx hx of colon cancer father had leukemia. No smoking, or abusing alcohol. She takes rare Motrin 3 to 4 times a week  she lives with her daughter. She had Rheumatoid arthritis. on Plaquenil and Methotrexate. She has COPD from second hand smoking. No DM, CAD or CVA. The anemia is mild 9.8 the TSH, iron level and ferritin were normal but being on Methotrexate we need to check the Folic acid and Vit Z-61. she is chronically constipated has one bm every 2 days. Consent: The benefits, risks, and alternatives to the procedure were discussed and informed consent was obtained from the patient. Preparation: EKG, pulse, pulse oximetry and blood pressure were monitored throughout the procedure. ASA Classification: Class II- . The heart is an S1-S2 and regular heart rate and rhythm. Lungs are clear to auscultation and percussion. Abdomen is soft, nondistended, and nontender.  Mental Status: awake, alert, and oriented to person, place, and time    Medications:  · Fentanyl 200 mcg IV, Versed 5 mg IV and Glucagon 1 mg IV throughout the procedure. Rectal Exam: Normal Rectal Exam. No Blood. Pathology Specimens:  2    Procedure: The pediatric colonoscope was passed with great dificulty through the anus under direct visualization and advanced to the cecum. The scope was withdrawn and the mucosa was carefully examined. The quality of the preparation was good. The views were good. The patient's toleration of the procedure was excellent. The exam was done twice to the cecum. Retroflexion is made in the rectum. Total time is 52 minutes and withdrawal time is 43 minutes. Findings:    Rectum:   Small internal hemorrhoids. Sigmoid:   Very difficult, tortuous, spastic, loopy and fixed sigmoid colon with moderate sigmoid diverticulosis. 7 mm sessile polyp in a difficult location at 25 cm, cold snared. Descending Colon:   Normal   Transverse Colon:   Normal   Ascending Colon:    6 mm sessile polyp in the hepatic flexure cold snared  Cecum:   Normal   Terminal Ileum:   Not entered      Unplanned Events: There were no unplanned events. Estimated Blood Loss: None  Impressions: Small internal hemorrhoids. Very difficult colonoscopy caused by a very tortuous, spastic, loopy and fixed sigmoid colon with moderate sigmoid diverticulosis. 7 mm sessile polyp in a difficult location at 25 cm, cold snared. 6 mm sessile polyp in the hepatic flexure cold snared. Normal Mucosa. No blood or AVM found. Complications: None; patient tolerated the procedure well. Recommendations:  · Discharge home when standard parameters are met. · Resume a high fiber diet. · Resume own medications. It is better to avoid all NSAID's. · Colonoscopy recommendation in 7 years with a better bowel prep. · Take Miralax and/ or Colace 100 mg twice daily on regular basis to treat the constipation  · Next we need to check the stomach.     Procedure Codes:    · Radha Ballard [[id number]]    Endoscope Information:  Model Number(s)    [id number]   Assistant: None  Signed By: Filippo Klein MD Date: 1/25/2021

## 2021-01-25 NOTE — DISCHARGE INSTRUCTIONS
Majo Santizo   DISCHARGE SUMMARY from Nurse    PATIENT INSTRUCTIONS:    After general anesthesia or intravenous sedation, for 24 hours or while taking prescription Narcotics:  · Limit your activities  · Do not drive and operate hazardous machinery  · Do not make important personal or business decisions  · Do  not drink alcoholic beverages  · If you have not urinated within 8 hours after discharge, please contact your surgeon on call. Report the following to your surgeon:  · Excessive pain, swelling, redness or odor of or around the surgical area  · Temperature over 100.5  · Nausea and vomiting lasting longer than 4 hours or if unable to take medications  · Any signs of decreased circulation or nerve impairment to extremity: change in color, persistent  numbness, tingling, coldness or increase pain  · Any questions    What to do at Home:  Recommended activity: as above     If you experience any of the following symptoms as above , please follow up with Doctor Syl Hollis. *  Please give a list of your current medications to your Primary Care Provider. *  Please update this list whenever your medications are discontinued, doses are      changed, or new medications (including over-the-counter products) are added. *  Please carry medication information at all times in case of emergency situations. These are general instructions for a healthy lifestyle:    No smoking/ No tobacco products/ Avoid exposure to second hand smoke  Surgeon General's Warning:  Quitting smoking now greatly reduces serious risk to your health.     Obesity, smoking, and sedentary lifestyle greatly increases your risk for illness    A healthy diet, regular physical exercise & weight monitoring are important for maintaining a healthy lifestyle    You may be retaining fluid if you have a history of heart failure or if you experience any of the following symptoms:  Weight gain of 3 pounds or more overnight or 5 pounds in a week, increased swelling in our hands or feet or shortness of breath while lying flat in bed. Please call your doctor as soon as you notice any of these symptoms; do not wait until your next office visit. The discharge information has been reviewed with the patient and caregiver. The patient and caregiver verbalized understanding. Discharge medications reviewed with the patient and caregiver and appropriate educational materials and side effects teaching were provided. ___________________________________________________________________________________________________________________________________  [id number]  1945    COLON DISCHARGE INSTRUCTIONS    Discomfort:  Redness at IV site- apply warm compress to area; if redness or soreness persist- contact your physician  There may be a slight amount of blood passed from the rectum  Gaseous discomfort- walking, belching will help relieve any discomfort  You may not operate a vehicle til the next day. You may not engage in an occupation involving machinery or appliances til the next day. You may not drink alcoholic beverages til the next day. DIET:   High fiber diet. ACTIVITY:  You may not  resume your normal daily activities til the next day. it is recommended that you spend the remainder of the day resting -  avoid any strenuous activity. CALL M.D.  IF ANY SIGN OF:   Increasing pain, nausea, vomiting  Abdominal distension (swelling)  New increased bleeding (oral or rectal)  Fever (chills)  Pain in chest area  Bloody discharge from nose or mouth  Shortness of breath    You may not  take any Advil, Aspirin, Ibuprofen, Motrin, Aleve, or Goodys for 7 days, ONLY  Tylenol as needed for pain. Post procedure diagnosis:  HEMORRHOID; SIGMOID DIVERTICULOSIS; TORTOUS COLON; POLYP    Follow-up Instructions: Your follow up colonoscopy will be in 7 years. We will notify you the results of your biopsy by letter within 2 weeks.     Zack Vega MD  January 25, 2021   Patient armband removed and shredded

## 2021-01-25 NOTE — PERIOP NOTES
Face to face Discharged  instruction given to daughter ,Tila Sherman and pt  and agreed with the plan. Discharged includes diet, activity limitations , medication to continue and f/u appointment. D/c to home in stable condition with care of family.

## 2021-01-25 NOTE — H&P
History and Physical    Patient: Pelon Huynh MRN: [id number]  SSN: [id number]    YOB: 1945  Age: 77 y.o. Sex: female      Subjective:      Pelon Huynh is a 77 y.o. female who weight loss and anemia    Past Medical History:   Diagnosis Date    Anxiety and depression     pt denies but takes medication for anxiety    Asthma     Chronic obstructive pulmonary disease (HCC)     GERD (gastroesophageal reflux disease)     Hypertension     -- no meds    Ill-defined condition     seasonal allergies    RA (rheumatoid arthritis) ([facility] [address]      Past Surgical History:   Procedure Laterality Date    HX APPENDECTOMY      HX CATARACT REMOVAL Bilateral     HX  SECTION      x 2    HX CHOLECYSTECTOMY      HX HERNIA REPAIR      ventral    HX MOHS PROCEDURES Right     HX ORTHOPAEDIC Bilateral     knee replacement    HX TONSILLECTOMY        History reviewed. No pertinent family history. Social History     Tobacco Use    Smoking status: Never Smoker    Smokeless tobacco: Never Used   Substance Use Topics    Alcohol use: No      Prior to Admission medications    Medication Sig Start Date End Date Taking? Authorizing Provider   esomeprazole (NEXIUM) 20 mg capsule Take 20 mg by mouth daily. Indications: GASTROESOPHAGEAL REFLUX   Yes Provider, Historical   fexofenadine (ALLEGRA) 180 mg tablet Take 180 mg by mouth daily. Indications: ALLERGIC RHINITIS   Yes Provider, Historical   ALPRAZolam (XANAX) 0.25 mg tablet Take 1 Tab by mouth every eight (8) hours as needed for Anxiety. Max Daily Amount: 0.75 mg. 17   Jorge Camejo MD   albuterol (PROAIR HFA) 90 mcg/actuation inhaler Take 2 Puffs by inhalation two (2) times a day. Indications: CHRONIC OBSTRUCTIVE PULMONARY DISEASE 6/23/15   Suleman Infante MD   clonazeP[facility] 0.5 mg tablet Take 0.5 mg by mouth daily.  Indications: anxiety    Provider, Historical        Allergies   Allergen Reactions    Latex Anaphylaxis  Aspirin Anaphylaxis     Patient has tolerated with NSAIDS    Sulfa (Sulfonamide Antibiotics) Anaphylaxis    Egg Swelling     Eye swelling    Iodinated Contrast Media Unknown (comments)    Pcn [Penicillins] Swelling       Review of Systems:  Pertinent items are noted in the History of Present Illness. Objective:     Vitals:    01/25/21 1234 01/25/21 1335   BP: (!) 119/90 133/68   Pulse: 75 64   Resp: 16 16   Temp: 96.9 °F (36.1 °C)    SpO2: 100% 99%   Weight: 49.9 kg (110 lb)    Height: 5' 5\" (1.651 m)         Physical Exam:  General:  Alert, cooperative, no distress, appears stated age. Eyes:  Conjunctivae/corneas clear. PERRL, EOMs intact. Fundi benign   Ears:  Normal TMs and external ear canals both ears. Nose: Nares normal. Septum midline. Mucosa normal. No drainage or sinus tenderness. Mouth/Throat: Lips, mucosa, and tongue normal. Teeth and gums normal.   Neck: Supple, symmetrical, trachea midline, no adenopathy, thyroid: no enlargment/tenderness/nodules, no carotid bruit and no JVD. Back:   Symmetric, no curvature. ROM normal. No CVA tenderness. Lungs:   Clear to auscultation bilaterally. Heart:  Regular rate and rhythm, S1, S2 normal, no murmur, click, rub or gallop. Abdomen:   Soft, non-tender. Bowel sounds normal. No masses,  No organomegaly. Extremities: Extremities normal, atraumatic, no cyanosis or edema. Pulses: 2+ and symmetric all extremities. Skin: Skin color, texture, turgor normal. No rashes or lesions   Lymph nodes: Cervical, supraclavicular, and axillary nodes normal.   Neurologic: CNII-XII intact. Normal strength, sensation and reflexes throughout. Assessment:         Plan:     lost 45 lbs in 6 months not sure why. she claims that she didn't have much appetite  she was having for one month frequent epigastric pain but resolved after taking omeprazole she is very good historian as she does not remember the name of her medication.  She claims that she used to have intermittent heartburns. No dysphagia. had an EGD about 2 years ago in [place] it was negative. She had a negative colonoscopy 13 years ago. She has one bm every 2 days. She appendectomy, hernia repair after 3 deliveries. cholecystectomy, right rotator cuff surgery, bilateral knee replacement  No Fx hx of colon cancer father had leukemia  No smoking, or abusing alcohol. She takes rare Motrin 3 to 4 times a week  she lives with her daughter. She had Rheumatoid arthritis. on Plaquenil and Methotrexate. She has COPD from second hand smoking  No DM, CAD or CVA   we need to check her stomach and the H Pylori status. Her anemia is mild 9.8 the TSH, iron level and ferritin were normal but being on Methotrexate we need to check the Folic acid and Vit K57  mild 9.8 with normal iron saturation and retic count. she is chronically constipated has one bm every 2 days  I advised to consume more water, cooked vegetables and fresh fruits and vegetables.  Should not hesitate to Take Miralax as needed or on regular basis once or more to control her symptoms but sometimes have to add another laxative or take even an enema if the above do not work  We need to do a colonoscopy later but first her stomach    Signed By: Navneet Seaz MD     January 25, 2021

## 2021-02-27 ENCOUNTER — APPOINTMENT (OUTPATIENT)
Dept: GENERAL RADIOLOGY | Age: 76
End: 2021-02-27
Attending: EMERGENCY MEDICINE
Payer: MEDICARE

## 2021-02-27 ENCOUNTER — APPOINTMENT (OUTPATIENT)
Dept: CT IMAGING | Age: 76
End: 2021-02-27
Attending: EMERGENCY MEDICINE
Payer: MEDICARE

## 2021-02-27 ENCOUNTER — HOSPITAL ENCOUNTER (EMERGENCY)
Age: 76
Discharge: HOME | End: 2021-02-28
Attending: EMERGENCY MEDICINE
Payer: MEDICARE

## 2021-02-27 DIAGNOSIS — U07.1 COVID-19 VIRUS INFECTION: Primary | ICD-10-CM

## 2021-02-27 DIAGNOSIS — N30.00 ACUTE CYSTITIS WITHOUT HEMATURIA: ICD-10-CM

## 2021-02-27 LAB
ALBUMIN SERPL-MCNC: 3 G/DL (ref 3.4–5)
ALBUMIN/GLOB SERPL: 0.6 {RATIO} (ref 0.8–1.7)
ALP SERPL-CCNC: 72 U/L (ref 45–117)
ALT SERPL-CCNC: 21 U/L (ref 13–56)
ANION GAP SERPL CALC-SCNC: 8 MMOL/L (ref 3–18)
APPEARANCE UR: CLEAR
AST SERPL-CCNC: 38 U/L (ref 10–38)
BACTERIA URNS QL MICRO: ABNORMAL /HPF
BASOPHILS # BLD: 0 K/UL (ref 0–0.1)
BASOPHILS NFR BLD: 0 % (ref 0–2)
BILIRUB SERPL-MCNC: 0.4 MG/DL (ref 0.2–1)
BILIRUB UR QL: NEGATIVE
BNP SERPL-MCNC: 257 PG/ML (ref 0–900)
BUN SERPL-MCNC: 32 MG/DL (ref 7–18)
BUN/CREAT SERPL: 42 (ref 12–20)
CALCIUM SERPL-MCNC: 9.5 MG/DL (ref 8.5–10.1)
CHLORIDE SERPL-SCNC: 101 MMOL/L (ref 100–111)
CK MB CFR SERPL CALC: 5.2 % (ref 0–4)
CK MB SERPL-MCNC: 3 NG/ML (ref 5–25)
CK SERPL-CCNC: 58 U/L (ref 26–192)
CO2 SERPL-SCNC: 26 MMOL/L (ref 21–32)
COLOR UR: YELLOW
CREAT SERPL-MCNC: 0.76 MG/DL (ref 0.6–1.3)
D DIMER PPP FEU-MCNC: 1.91 UG/ML(FEU)
DIFFERENTIAL METHOD BLD: ABNORMAL
EOSINOPHIL # BLD: 0 K/UL (ref 0–0.4)
EOSINOPHIL NFR BLD: 0 % (ref 0–5)
EPITH CASTS URNS QL MICRO: ABNORMAL /LPF (ref 0–5)
ERYTHROCYTE [DISTWIDTH] IN BLOOD BY AUTOMATED COUNT: 13.9 % (ref 11.6–14.5)
GLOBULIN SER CALC-MCNC: 4.8 G/DL (ref 2–4)
GLUCOSE SERPL-MCNC: 103 MG/DL (ref 74–99)
GLUCOSE UR STRIP.AUTO-MCNC: NEGATIVE MG/DL
HCT VFR BLD AUTO: 39.9 % (ref 35–45)
HGB BLD-MCNC: 13.1 G/DL (ref 12–16)
HGB UR QL STRIP: NEGATIVE
INR PPP: 1.1 (ref 0.8–1.2)
KETONES UR QL STRIP.AUTO: ABNORMAL MG/DL
LACTATE BLD-SCNC: 2.3 MMOL/L (ref 0.4–2)
LACTATE BLD-SCNC: 2.48 MMOL/L (ref 0.4–2)
LEUKOCYTE ESTERASE UR QL STRIP.AUTO: ABNORMAL
LYMPHOCYTES # BLD: 0.4 K/UL (ref 0.9–3.6)
LYMPHOCYTES NFR BLD: 3 % (ref 21–52)
MCH RBC QN AUTO: 28.4 PG (ref 24–34)
MCHC RBC AUTO-ENTMCNC: 32.8 G/DL (ref 31–37)
MCV RBC AUTO: 86.4 FL (ref 74–97)
MONOCYTES # BLD: 0.9 K/UL (ref 0.05–1.2)
MONOCYTES NFR BLD: 7 % (ref 3–10)
MUCOUS THREADS URNS QL MICRO: ABNORMAL /LPF
NEUTS SEG # BLD: 11.6 K/UL (ref 1.8–8)
NEUTS SEG NFR BLD: 90 % (ref 40–73)
NITRITE UR QL STRIP.AUTO: NEGATIVE
PH UR STRIP: 5.5 [PH] (ref 5–8)
PLATELET # BLD AUTO: 475 K/UL (ref 135–420)
PMV BLD AUTO: 9.8 FL (ref 9.2–11.8)
POTASSIUM SERPL-SCNC: 4.6 MMOL/L (ref 3.5–5.5)
PROT SERPL-MCNC: 7.8 G/DL (ref 6.4–8.2)
PROT UR STRIP-MCNC: 30 MG/DL
PROTHROMBIN TIME: 14.2 SEC (ref 11.5–15.2)
RBC # BLD AUTO: 4.62 M/UL (ref 4.2–5.3)
RBC #/AREA URNS HPF: ABNORMAL /HPF (ref 0–5)
SODIUM SERPL-SCNC: 135 MMOL/L (ref 136–145)
SP GR UR REFRACTOMETRY: >1.03 (ref 1–1.03)
TROPONIN I SERPL-MCNC: <0.02 NG/ML (ref 0–0.04)
UROBILINOGEN UR QL STRIP.AUTO: 1 EU/DL (ref 0.2–1)
WBC # BLD AUTO: 13 K/UL (ref 4.6–13.2)
WBC URNS QL MICRO: ABNORMAL /HPF (ref 0–5)

## 2021-02-27 PROCEDURE — 99285 EMERGENCY DEPT VISIT HI MDM: CPT

## 2021-02-27 PROCEDURE — 85025 COMPLETE CBC W/AUTO DIFF WBC: CPT

## 2021-02-27 PROCEDURE — 93005 ELECTROCARDIOGRAM TRACING: CPT

## 2021-02-27 PROCEDURE — 87086 URINE CULTURE/COLONY COUNT: CPT

## 2021-02-27 PROCEDURE — 85379 FIBRIN DEGRADATION QUANT: CPT

## 2021-02-27 PROCEDURE — 83880 ASSAY OF NATRIURETIC PEPTIDE: CPT

## 2021-02-27 PROCEDURE — 96361 HYDRATE IV INFUSION ADD-ON: CPT

## 2021-02-27 PROCEDURE — M0239 BAMLANIVIMAB-XXXX INFUSION: HCPCS | Performed by: EMERGENCY MEDICINE

## 2021-02-27 PROCEDURE — 83605 ASSAY OF LACTIC ACID: CPT

## 2021-02-27 PROCEDURE — 85610 PROTHROMBIN TIME: CPT

## 2021-02-27 PROCEDURE — 74011250636 HC RX REV CODE- 250/636: Performed by: EMERGENCY MEDICINE

## 2021-02-27 PROCEDURE — 94762 N-INVAS EAR/PLS OXIMTRY CONT: CPT

## 2021-02-27 PROCEDURE — 81001 URINALYSIS AUTO W/SCOPE: CPT

## 2021-02-27 PROCEDURE — 71275 CT ANGIOGRAPHY CHEST: CPT

## 2021-02-27 PROCEDURE — 80053 COMPREHEN METABOLIC PANEL: CPT

## 2021-02-27 PROCEDURE — 96366 THER/PROPH/DIAG IV INF ADDON: CPT

## 2021-02-27 PROCEDURE — 82553 CREATINE MB FRACTION: CPT

## 2021-02-27 PROCEDURE — 71045 X-RAY EXAM CHEST 1 VIEW: CPT

## 2021-02-27 PROCEDURE — 87040 BLOOD CULTURE FOR BACTERIA: CPT

## 2021-02-27 PROCEDURE — 74011000636 HC RX REV CODE- 636: Performed by: EMERGENCY MEDICINE

## 2021-02-27 PROCEDURE — 96365 THER/PROPH/DIAG IV INF INIT: CPT

## 2021-02-27 RX ORDER — LEVOFLOXACIN 5 MG/ML
750 INJECTION, SOLUTION INTRAVENOUS EVERY 24 HOURS
Status: DISCONTINUED | OUTPATIENT
Start: 2021-02-27 | End: 2021-02-28 | Stop reason: HOSPADM

## 2021-02-27 RX ORDER — SODIUM CHLORIDE 0.9 % (FLUSH) 0.9 %
5-10 SYRINGE (ML) INJECTION AS NEEDED
Status: DISCONTINUED | OUTPATIENT
Start: 2021-02-27 | End: 2021-02-28 | Stop reason: HOSPADM

## 2021-02-27 RX ADMIN — SODIUM CHLORIDE 500 ML: 900 INJECTION, SOLUTION INTRAVENOUS at 22:35

## 2021-02-27 RX ADMIN — IOPAMIDOL 80 ML: 755 INJECTION, SOLUTION INTRAVENOUS at 23:41

## 2021-02-27 RX ADMIN — Medication 10 ML: at 22:36

## 2021-02-27 RX ADMIN — Medication 10 ML: at 22:09

## 2021-02-27 RX ADMIN — LEVOFLOXACIN 750 MG: 5 INJECTION, SOLUTION INTRAVENOUS at 22:27

## 2021-02-28 VITALS
HEIGHT: 65 IN | RESPIRATION RATE: 13 BRPM | BODY MASS INDEX: 18.33 KG/M2 | DIASTOLIC BLOOD PRESSURE: 86 MMHG | TEMPERATURE: 98.7 F | HEART RATE: 59 BPM | SYSTOLIC BLOOD PRESSURE: 135 MMHG | WEIGHT: 110 LBS | OXYGEN SATURATION: 100 %

## 2021-02-28 LAB
ATRIAL RATE: 68 BPM
CALCULATED P AXIS, ECG09: 21 DEGREES
CALCULATED R AXIS, ECG10: 2 DEGREES
CALCULATED T AXIS, ECG11: 47 DEGREES
DIAGNOSIS, 93000: NORMAL
LACTATE BLD-SCNC: 1.36 MMOL/L (ref 0.4–2)
P-R INTERVAL, ECG05: 106 MS
Q-T INTERVAL, ECG07: 410 MS
QRS DURATION, ECG06: 74 MS
QTC CALCULATION (BEZET), ECG08: 435 MS
VENTRICULAR RATE, ECG03: 68 BPM

## 2021-02-28 PROCEDURE — 74011250637 HC RX REV CODE- 250/637: Performed by: EMERGENCY MEDICINE

## 2021-02-28 PROCEDURE — 74011000258 HC RX REV CODE- 258: Performed by: EMERGENCY MEDICINE

## 2021-02-28 PROCEDURE — 83605 ASSAY OF LACTIC ACID: CPT

## 2021-02-28 PROCEDURE — 96361 HYDRATE IV INFUSION ADD-ON: CPT

## 2021-02-28 PROCEDURE — 74011000636 HC RX REV CODE- 636: Performed by: EMERGENCY MEDICINE

## 2021-02-28 PROCEDURE — 74011250636 HC RX REV CODE- 250/636: Performed by: EMERGENCY MEDICINE

## 2021-02-28 RX ORDER — CIPROFLOXACIN 500 MG/1
500 TABLET ORAL 2 TIMES DAILY
Qty: 14 TAB | Refills: 0 | Status: SHIPPED | OUTPATIENT
Start: 2021-02-28 | End: 2021-03-07

## 2021-02-28 RX ORDER — ACETAMINOPHEN 325 MG/1
650 TABLET ORAL ONCE
Status: COMPLETED | OUTPATIENT
Start: 2021-02-28 | End: 2021-02-28

## 2021-02-28 RX ADMIN — ACETAMINOPHEN 650 MG: 325 TABLET ORAL at 02:00

## 2021-02-28 RX ADMIN — SODIUM CHLORIDE 700 MG: 0.9 INJECTION, SOLUTION INTRAVENOUS at 01:55

## 2021-02-28 RX ADMIN — Medication 10 ML: at 01:42

## 2021-02-28 RX ADMIN — SODIUM CHLORIDE 500 ML: 900 INJECTION, SOLUTION INTRAVENOUS at 01:40

## 2021-02-28 NOTE — ED PROVIDER NOTES
EMERGENCY DEPARTMENT HISTORY AND PHYSICAL EXAM    Date: 2/27/2021  Patient Name: Dinesh Schuster    History of Presenting Illness     Chief Complaint   Patient presents with    Positive For Covid-19    Decreased Appetite         History Provided By: Patient    Additional History (Context):   Dinesh Schuster is a 77 y.o. female with PMHX COPD presents to the emergency department via private vehicle C/O decreased appetite, generalized weakness since being diagnosed with Covid 3 days ago. Patient reports that she started experiencing a cough, was diagnosed with Covid. Since that time has been feeling very weak. Had no episodes of nausea, vomiting or diarrhea. Pt denies chest pain or shortness of breath, and any other sxs or complaints. No relieving or exacerbating factors identified. PCP: Mayme Babinski, MD    Current Facility-Administered Medications   Medication Dose Route Frequency Provider Last Rate Last Admin    sodium chloride (NS) flush 5-10 mL  5-10 mL IntraVENous PRN Narciso Hubbard, DO   10 mL at 02/28/21 0142    levoFLOXacin (LEVAQUIN) 750 mg in D5W IVPB  750 mg IntraVENous Q24H Blaine Pond DO   Stopped at 02/28/21 0035     Current Outpatient Medications   Medication Sig Dispense Refill    ciprofloxacin HCl (Cipro) 500 mg tablet Take 1 Tab by mouth two (2) times a day for 7 days. 14 Tab 0    ALPRAZolam (XANAX) 0.25 mg tablet Take 1 Tab by mouth every eight (8) hours as needed for Anxiety. Max Daily Amount: 0.75 mg. 8 Tab 0    albuterol (PROAIR HFA) 90 mcg/actuation inhaler Take 2 Puffs by inhalation two (2) times a day. Indications: CHRONIC OBSTRUCTIVE PULMONARY DISEASE 1 Inhaler 0    esomeprazole (NEXIUM) 20 mg capsule Take 20 mg by mouth daily. Indications: GASTROESOPHAGEAL REFLUX      clonazePAM (KLONOPIN) 0.5 mg tablet Take 0.5 mg by mouth daily. Indications: anxiety      fexofenadine (ALLEGRA) 180 mg tablet Take 180 mg by mouth daily.  Indications: ALLERGIC RHINITIS         Past History     Past Medical History:  Past Medical History:   Diagnosis Date    Anxiety and depression     pt denies but takes medication for anxiety    Asthma     Chronic obstructive pulmonary disease ([facility] [address]     GERD (gastroesophageal reflux disease)     Hypertension     - no meds    Ill-defined condition     seasonal allergies    RA (rheumatoid arthritis) ([facility] [address]        Past Surgical History:  Past Surgical History:   Procedure Laterality Date    COLONOSCOPY N/A 2021    COLONOSCOPY; POLYPECTOMY performed by Guido Barrios MD at [facility] ENDOSCOPY    HX APPENDECTOMY      HX CATARACT REMOVAL Bilateral     HX  SECTION      x 2    HX CHOLECYSTECTOMY      HX HERNIA REPAIR      ventral    HX MOHS PROCEDURES Right     HX ORTHOPAEDIC Bilateral     knee replacement    HX TONSILLECTOMY         Family History:  History reviewed. No pertinent family history. Social History:  Social History     Tobacco Use    Smoking status: Never Smoker    Smokeless tobacco: Never Used   Substance Use Topics    Alcohol use: No    Drug use: No       Allergies: Allergies   Allergen Reactions    Latex Anaphylaxis    Aspirin Anaphylaxis     Patient has tolerated with NSAIDS    Sulfa (Sulfonamide Antibiotics) Anaphylaxis    Egg Swelling     Eye swelling    Iodinated Contrast Media Unknown (comments)    Pcn [Penicillins] Swelling         Review of Systems   Review of Systems   Constitutional: Positive for activity change and appetite change. Negative for chills and fever. HENT: Negative for congestion, ear pain, sinus pain and sore throat. Eyes: Negative for pain and visual disturbance. Respiratory: Negative for cough and shortness of breath. Cardiovascular: Negative for chest pain and leg swelling. Gastrointestinal: Negative for abdominal pain, constipation, diarrhea, nausea and vomiting.    Genitourinary: Negative for dysuria, hematuria, vaginal bleeding and vaginal discharge. Musculoskeletal: Negative for back pain and neck pain. Skin: Negative for rash and wound. Neurological: Positive for weakness. Negative for dizziness, tremors, light-headedness and numbness. All other systems reviewed and are negative. Physical Exam     Vitals:    02/28/21 0245 02/28/21 0255 02/28/21 0334 02/28/21 0405   BP: 130/75 125/84 (!) 145/77 133/68   Pulse: (!) 57 63 62 60   Resp: 13 15 21 16   Temp: 99 °F (37.2 °C) 98.5 °F (36.9 °C) 98.6 °F (37 °C) 98.4 °F (36.9 °C)   SpO2: 100% 99% 99% 100%   Weight:       Height:         Physical Exam    Nursing note and vitals reviewed    Constitutional: Elderly -American female, no acute distress  Head: Normocephalic, Atraumatic  Eyes: Pupils are equal, round, and reactive to light, EOMI  Neck: Supple, non-tender  Cardiovascular: Regular rate and rhythm, no murmurs, rubs, or gallops, + 2 radial pulses bilaterally  Chest: Normal work of breathing and chest excursion bilaterally  Lungs: Clear to ausculation bilaterally, no wheezes, no rhonchi  Abdomen: Soft, non tender, non distended, normoactive bowel sounds  Back: No evidence of trauma or deformity  Extremities: No evidence of trauma or deformity, no LE edema.  No streaking erythema, vesicular lesions, ulcerations or bulla  Skin: Warm and dry, normal cap refill  Neuro: Alert and appropriate, CN intact, normal speech, moving all 4 extremities freely and symmetrically  Psychiatric: Normal mood and affect       Diagnostic Study Results     Labs -     Recent Results (from the past 12 hour(s))   EKG, 12 LEAD, INITIAL    Collection Time: 02/27/21  9:46 PM   Result Value Ref Range    Ventricular Rate 68 BPM    Atrial Rate 68 BPM    P-R Interval 106 ms    QRS Duration 74 ms    Q-T Interval 410 ms    QTC Calculation (Bezet) 435 ms    Calculated P Axis 21 degrees    Calculated R Axis 2 degrees    Calculated T Axis 47 degrees    Diagnosis       Sinus rhythm with short AL with fusion complexes  Moderate voltage criteria for LVH, may be normal variant  Nonspecific ST and T wave abnormality  Abnormal ECG  When compared with ECG of 29-JUN-2017 16:52,  fusion complexes are now present  WV interval has increased  Vent. rate has increased BY  22 BPM  ST less depressed in Inferior leads  ST no longer elevated in Lateral leads     METABOLIC PANEL, COMPREHENSIVE    Collection Time: 02/27/21  9:55 PM   Result Value Ref Range    Sodium 135 (L) 136 - 145 mmol/L    Potassium 4.6 3.5 - 5.5 mmol/L    Chloride 101 100 - 111 mmol/L    CO2 26 21 - 32 mmol/L    Anion gap 8 3.0 - 18 mmol/L    Glucose 103 (H) 74 - 99 mg/dL    BUN 32 (H) 7.0 - 18 MG/DL    Creatinine 0.76 0.6 - 1.3 MG/DL    BUN/Creatinine ratio 42 (H) 12 - 20      GFR est AA >60 >60 ml/min/1.73m2    GFR est non-AA >60 >60 ml/min/1.73m2    Calcium 9.5 8.5 - 10.1 MG/DL    Bilirubin, total 0.4 0.2 - 1.0 MG/DL    ALT (SGPT) 21 13 - 56 U/L    AST (SGOT) 38 10 - 38 U/L    Alk. phosphatase 72 45 - 117 U/L    Protein, total 7.8 6.4 - 8.2 g/dL    Albumin 3.0 (L) 3.4 - 5.0 g/dL    Globulin 4.8 (H) 2.0 - 4.0 g/dL    A-G Ratio 0.6 (L) 0.8 - 1.7     CBC WITH AUTOMATED DIFF    Collection Time: 02/27/21  9:55 PM   Result Value Ref Range    WBC 13.0 4.6 - 13.2 K/uL    RBC 4.62 4.20 - 5.30 M/uL    HGB 13.1 12.0 - 16.0 g/dL    HCT 39.9 35.0 - 45.0 %    MCV 86.4 74.0 - 97.0 FL    MCH 28.4 24.0 - 34.0 PG    MCHC 32.8 31.0 - 37.0 g/dL    RDW 13.9 11.6 - 14.5 %    PLATELET 581 (H) 179 - 420 K/uL    MPV 9.8 9.2 - 11.8 FL    NEUTROPHILS 90 (H) 40 - 73 %    LYMPHOCYTES 3 (L) 21 - 52 %    MONOCYTES 7 3 - 10 %    EOSINOPHILS 0 0 - 5 %    BASOPHILS 0 0 - 2 %    ABS. NEUTROPHILS 11.6 (H) 1.8 - 8.0 K/UL    ABS. LYMPHOCYTES 0.4 (L) 0.9 - 3.6 K/UL    ABS. MONOCYTES 0.9 0.05 - 1.2 K/UL    ABS. EOSINOPHILS 0.0 0.0 - 0.4 K/UL    ABS.  BASOPHILS 0.0 0.0 - 0.1 K/UL    DF AUTOMATED     PROTHROMBIN TIME + INR    Collection Time: 02/27/21  9:55 PM   Result Value Ref Range    Prothrombin time 14.2 11.5 - 15.2 sec    INR 1.1 0.8 - 1.2     D DIMER    Collection Time: 02/27/21  9:55 PM   Result Value Ref Range    D DIMER 1.91 (H) <0.46 ug/ml(FEU)   NT-PRO BNP    Collection Time: 02/27/21  9:55 PM   Result Value Ref Range    NT pro- 0 - 900 PG/ML   CARDIAC PANEL,(CK, CKMB & TROPONIN)    Collection Time: 02/27/21  9:55 PM   Result Value Ref Range    CK - MB 3.0 <3.6 ng/ml    CK-MB Index 5.2 (H) 0.0 - 4.0 %    CK 58 26 - 192 U/L    Troponin-I, QT <0.02 0.0 - 0.045 NG/ML   POC LACTIC ACID    Collection Time: 02/27/21 10:01 PM   Result Value Ref Range    Lactic Acid (POC) 2.48 (HH) 0.40 - 2.00 mmol/L   POC LACTIC ACID    Collection Time: 02/27/21 10:24 PM   Result Value Ref Range    Lactic Acid (POC) 2.30 (HH) 0.40 - 2.00 mmol/L   URINALYSIS W/ RFLX MICROSCOPIC    Collection Time: 02/27/21 10:30 PM   Result Value Ref Range    Color YELLOW      Appearance CLEAR      Specific gravity >1.030 (H) 1.005 - 1.030    pH (UA) 5.5 5.0 - 8.0      Protein 30 (A) NEG mg/dL    Glucose Negative NEG mg/dL    Ketone TRACE (A) NEG mg/dL    Bilirubin Negative NEG      Blood Negative NEG      Urobilinogen 1.0 0.2 - 1.0 EU/dL    Nitrites Negative NEG      Leukocyte Esterase MODERATE (A) NEG     URINE MICROSCOPIC ONLY    Collection Time: 02/27/21 10:30 PM   Result Value Ref Range    WBC 11 to 20 0 - 5 /hpf    RBC 0 to 1 0 - 5 /hpf    Epithelial cells 1+ 0 - 5 /lpf    Bacteria 2+ (A) NEG /hpf    Mucus 3+ (A) NEG /lpf   POC LACTIC ACID    Collection Time: 02/28/21  1:41 AM   Result Value Ref Range    Lactic Acid (POC) 1.36 0.40 - 2.00 mmol/L       Radiologic Studies -   CTA CHEST W OR W WO CONT   Final Result      1. No evidence of pulmonary embolism. 2.  Patchy bilateral multilobar groundglass infiltrates. Suspect Covid 19   disease. XR CHEST PORT    (Results Pending)     CT Results  (Last 48 hours)               02/28/21 0005  CTA CHEST W OR W WO CONT Final result    Impression:      1.   No evidence of pulmonary embolism. 2.  Patchy bilateral multilobar groundglass infiltrates. Suspect Covid 19   disease. Narrative:  EXAM: CTA chest       INDICATION: Pain. Shortness of breath. COMPARISON: June 29, 2017. TECHNIQUE: Axial CT imaging from the thoracic inlet through the diaphragm with   intravenous contrast. Coronal and sagittal MIP reformats were generated. Dose   reduction techniques used: automated exposure control, adjustment of the mAs   and/or kVp according to patient size, and iterative reconstruction techniques. Digital imaging and communications in Medicine (DICOM) format image data are   available to nonaffiliated external healthcare facilities or entities on a   secure, media free, reciprocally searchable basis with patient authorization for   at least 12 months after this study. _______________       FINDINGS:       EXAM QUALITY: Adequate. PULMONARY ARTERIES: No evidence of pulmonary embolism. MEDIASTINUM: Normal heart size. No evidence of right heart strain. Aorta is   unremarkable. No pericardial effusion. LUNGS: There is mild atelectatic change at the lung bases. There are patchy   scattered groundglass infiltrates. PLEURA: Normal.       AIRWAY: Normal.       LYMPH NODES: No enlarged nodes. UPPER ABDOMEN: Unremarkable. OTHER: No acute or aggressive osseous abnormalities identified. _______________               CXR Results  (Last 48 hours)    None            Medical Decision Making   I am the first provider for this patient. I reviewed the vital signs, available nursing notes, past medical history, past surgical history, family history and social history. Vital Signs-Reviewed the patient's vital signs. Pulse Oximetry Analysis -97% on room air    Cardiac Monitor:  Rate: 95 bpm  Rhythm: Regular    EKG interpretation: (Preliminary)  9:24 PM   Sinus rhythm with short WI at 68 bpm.  WI interval 106 bpm.  QRS 74 ms. QTc 435 ms. No acute ST elevation    Records Reviewed: Nursing Notes and Old Medical Records    Provider Notes:   77 y.o. female who presents with generalized weakness, decreased appetite after being diagnosed with Covid 3 days ago. On exam patient is afebrile, normotensive. Saturating 97% on room air, in no respiratory distress. She does not appear toxic. With her Covid infection, will initiate septic work-up. In light of the Covid coagulopathy, will check D-dimer. Procedures:  Procedures    ED Course:   9:24 PM   Initial assessment performed. The patients presenting problems have been discussed, and they are in agreement with the care plan formulated and outlined with them. I have encouraged them to ask questions as they arise throughout their visit. 10:07 PM  Patient's lactic acid is elevated 2.48. Code sepsis initiated. ABx initiated, will hold IVF due to his age, concern for respiratory compromise     1:11 AM  Patient with a WBC of 13,000. No bandemia. Initial lactic acid was slightly elevated. UTI consistent with a UTI, urine culture sent. Given a dose of antibiotics. Patient received 500 CC of IVF. Elevated D Dimer however CTA negative for PE, COVID lung changes noted. Patient appropriate for monoclonal antibody as she is not hypoxic, suspect elevated lactic acid secondary to metabolic dehydration with her BUN of 32. Will give another 500 cc of IV fluids. We will recheck her lactic acid. If improving, there is no indication for admission as the patient is otherwise in no respiratory distress, not hypoxic. She is in agreement with monoclonal antibody administration. 4:06 AM  Patient tolerated BAM without difficulty. Has had no episodes of anaphylaxis. Lactic acid has improved to 1.32, as discussed, likely not due to underlying sepsis however likely from mild metabolic derangements/dehydration with an elevated BUN of 32.   Patient continues to be in no acute respiratory distress, nontoxic, saturating well on room air. Diagnosis and Disposition     1:13 AM  I have spent 60 minutes of critical care time involved in lab review, consultations with specialist, family decision-making, and documentation. During this entire length of time I was immediately available to the patient. Critical Care: The reason for providing this level of medical care for this critically ill patient was due a critical illness that impaired one or more vital organ systems such that there was a high probability of imminent or life threatening deterioration in the patients condition. This care involved high complexity decision making to assess, manipulate, and support vital system functions, to treat this degreee vital organ system failure and to prevent further life threatening deterioration of the patients condition. DISCHARGE NOTE:  4:06 AM    Jackie Hankins's  results have been reviewed with her. She has been counseled regarding her diagnosis, treatment, and plan. She verbally conveys understanding and agreement of the signs, symptoms, diagnosis, treatment and prognosis and additionally agrees to follow up as discussed. She also agrees with the care-plan and conveys that all of her questions have been answered. I have also provided discharge instructions for her that include: educational information regarding their diagnosis and treatment, and list of reasons why they would want to return to the ED prior to their follow-up appointment, should her condition change. She has been provided with education for proper emergency department utilization. CLINICAL IMPRESSION:    1. COVID-19 virus infection    2. Acute cystitis without hematuria        PLAN:  1. D/C Home  2. Current Discharge Medication List      START taking these medications    Details   ciprofloxacin HCl (Cipro) 500 mg tablet Take 1 Tab by mouth two (2) times a day for 7 days. Qty: 14 Tab, Refills: 0           3.    Follow-up Information     Follow up With Specialties Details Why Contact Info    Cristal Ibarra MD [facility] Medicine Schedule an appointment as soon as possible for a visit in 2 days  [address] [place] [id number] [phone]      [facility] EMERGENCY DEPT Emergency Medicine  As needed if symptoms worsen [address] [place] [id number]  [phone]        ____________________________________     Please note that this dictation was completed with [employer], the computer voice recognition software. Quite often unanticipated grammatical, syntax, homophones, and other interpretive errors are inadvertently transcribed by the computer software. Please disregard these errors. Please excuse any errors that have escaped final proofreading.

## 2021-02-28 NOTE — ED TRIAGE NOTES
Patient tested positive for COVID on Wednesday and is having generalized weakness and decreased appetite

## 2021-03-01 ENCOUNTER — PATIENT OUTREACH (OUTPATIENT)
Dept: CASE MANAGEMENT | Age: 76
End: 2021-03-01

## 2021-03-01 LAB
BACTERIA SPEC CULT: NORMAL
SERVICE CMNT-IMP: NORMAL

## 2021-03-01 NOTE — PROGRESS NOTES
Patient contacted regarding [id number] diagnosis\". Discussed COVID-19 related testing which was not done at this time. Test results were not done. Patient informed of results, if available? n/a     LPN Care Coordinator contacted the patient by telephone to perform post discharge assessment. Call within 2 business days of discharge: Yes Verified name and  with patient as identifiers. Provided introduction to self, and explanation of the CTN/ACM/LPN role, and reason for call due to risk factors for infection and/or exposure to COVID-19. Symptoms reviewed with patient who verbalized the following symptoms: no new symptoms and no worsening symptoms      Due to no new or worsening symptoms encounter was not routed to provider for escalation. Discussed follow-up appointments. If no appointment was previously scheduled, appointment scheduling offered:  [facility] follow up appointment(s): No future appointments. Non-[facility] follow up appointment(s): pcp as needed     Advance Care Planning:   Does patient have an Advance Directive:  healthcare decision maker on file. Patient has following risk factors of: COPD and age. CTN/ACM/LPN reviewed discharge instructions, medical action plan and red flags such as increased shortness of breath, increasing fever and signs of decompensation with patient who verbalized understanding. Discussed exposure protocols and quarantine with CDC Guidelines What to do if you are sick with coronavirus disease .  Patient was given an opportunity for questions and concerns. The patient agrees to contact the [facility] exposure line [phone], formerly [facility] [address]  ([phone] and PCP office for questions related to their healthcare. CTN/ACM/LPN provided contact information for future needs.     Reviewed and educated patient on any new and changed medications related to discharge diagnosis     Patient/family/caregiver given information for [facility] and agrees to enroll no  Patient's preferred e-mail: n/a   Patient's preferred phone number: n/a  Based on Loop alert triggers, patient will be contacted by nurse care manager for worsening symptoms. Plan for follow-up call in 5-7 days based on severity of symptoms and risk factors.

## 2021-03-05 LAB
BACTERIA SPEC CULT: NORMAL
BACTERIA SPEC CULT: NORMAL
SERVICE CMNT-IMP: NORMAL
SERVICE CMNT-IMP: NORMAL

## 2021-03-08 ENCOUNTER — PATIENT OUTREACH (OUTPATIENT)
Dept: CASE MANAGEMENT | Age: 76
End: 2021-03-08

## 2021-03-08 NOTE — PROGRESS NOTES
Patient contacted regarding COVID-19 risk and screening. Discussed COVID-19 related testing which was not done at this time. Test results were not done. Patient informed of results, if available? n/a     LPN Care Coordinator contacted the caregiver by telephone to perform follow-up assessment. Verified name and  with caregiver as identifiers. Patient has following risk factors of: COPD and age. Symptoms reviewed with caregiver who verbalized the following symptoms: no new symptoms and no worsening symptoms. Spoke with patient's daughter. No HIPPA info given. She states her mother is doing better. No questions or concerns at this time. Was patient discharged with a pulse oximeter? no Discussed and confirmed pulse oximeter discharge instructions and when to notify provider or seek emergency care. Due to no new or worsening symptoms encounter was not routed to provider for escalation. Education provided regarding infection prevention, and signs and symptoms of COVID-19 and when to seek medical attention with caregiver who verbalized understanding. Discussed exposure protocols and quarantine from [address] you at higher risk for severe illness  and given an opportunity for questions and concerns. The caregiver agrees to contact the COVID-19 hotline [phone] or PCP office for questions related to their healthcare. LPN CC provided contact information for future reference. From CDC: Are you at higher risk for severe illness?  Wash your hands often.  Avoid close contact (6 feet, which is about two arm lengths) with people who are sick.  Put distance between yourself and other people if COVID-19 is spreading in your community.  Clean and disinfect frequently touched surfaces.  Avoid all cruise travel and non-essential air travel.  Call your healthcare professional if you have concerns about COVID-19 and your underlying condition or if you are sick.     For more information on steps you can take to protect yourself, see CDC's How to [place] for follow-up call in 5-7 days based on severity of symptoms and risk factors.

## 2021-08-10 ENCOUNTER — HOSPITAL ENCOUNTER (OUTPATIENT)
Dept: PREADMISSION TESTING | Age: 76
Discharge: HOME | End: 2021-08-10
Payer: MEDICARE

## 2021-08-10 ENCOUNTER — TRANSCRIBE ORDER (OUTPATIENT)
Dept: REGISTRATION | Age: 76
End: 2021-08-10

## 2021-08-10 DIAGNOSIS — Z01.818 PREOP EXAMINATION: Primary | ICD-10-CM

## 2021-08-10 DIAGNOSIS — Z01.818 PREOP EXAMINATION: ICD-10-CM

## 2021-08-10 LAB
ALBUMIN SERPL-MCNC: 3.7 G/DL (ref 3.4–5)
ALBUMIN/GLOB SERPL: 1 {RATIO} (ref 0.8–1.7)
ALP SERPL-CCNC: 68 U/L (ref 45–117)
ALT SERPL-CCNC: 18 U/L (ref 13–56)
ANION GAP SERPL CALC-SCNC: 4 MMOL/L (ref 3–18)
APPEARANCE UR: CLEAR
APTT PPP: 37.6 SEC (ref 23–36.4)
AST SERPL-CCNC: 17 U/L (ref 10–38)
ATRIAL RATE: 68 BPM
BASOPHILS # BLD: 0 K/UL (ref 0–0.1)
BASOPHILS NFR BLD: 0 % (ref 0–2)
BILIRUB SERPL-MCNC: 0.2 MG/DL (ref 0.2–1)
BILIRUB UR QL: NEGATIVE
BUN SERPL-MCNC: 25 MG/DL (ref 7–18)
BUN/CREAT SERPL: 34 (ref 12–20)
CALCIUM SERPL-MCNC: 9.3 MG/DL (ref 8.5–10.1)
CALCULATED P AXIS, ECG09: 58 DEGREES
CALCULATED R AXIS, ECG10: 29 DEGREES
CALCULATED T AXIS, ECG11: 26 DEGREES
CHLORIDE SERPL-SCNC: 109 MMOL/L (ref 100–111)
CO2 SERPL-SCNC: 27 MMOL/L (ref 21–32)
COLOR UR: YELLOW
CREAT SERPL-MCNC: 0.74 MG/DL (ref 0.6–1.3)
DIAGNOSIS, 93000: NORMAL
DIFFERENTIAL METHOD BLD: ABNORMAL
EOSINOPHIL # BLD: 0.3 K/UL (ref 0–0.4)
EOSINOPHIL NFR BLD: 4 % (ref 0–5)
ERYTHROCYTE [DISTWIDTH] IN BLOOD BY AUTOMATED COUNT: 15.4 % (ref 11.6–14.5)
GLOBULIN SER CALC-MCNC: 3.8 G/DL (ref 2–4)
GLUCOSE SERPL-MCNC: 79 MG/DL (ref 74–99)
GLUCOSE UR STRIP.AUTO-MCNC: NEGATIVE MG/DL
HBA1C MFR BLD: 5.8 % (ref 4.2–5.6)
HCT VFR BLD AUTO: 34.9 % (ref 35–45)
HGB BLD-MCNC: 11 G/DL (ref 12–16)
HGB UR QL STRIP: NEGATIVE
INR PPP: 1.1 (ref 0.8–1.2)
KETONES UR QL STRIP.AUTO: NEGATIVE MG/DL
LEUKOCYTE ESTERASE UR QL STRIP.AUTO: NEGATIVE
LYMPHOCYTES # BLD: 1.2 K/UL (ref 0.9–3.6)
LYMPHOCYTES NFR BLD: 15 % (ref 21–52)
MCH RBC QN AUTO: 28.9 PG (ref 24–34)
MCHC RBC AUTO-ENTMCNC: 31.5 G/DL (ref 31–37)
MCV RBC AUTO: 91.6 FL (ref 74–97)
MONOCYTES # BLD: 0.9 K/UL (ref 0.05–1.2)
MONOCYTES NFR BLD: 12 % (ref 3–10)
NEUTS SEG # BLD: 5.3 K/UL (ref 1.8–8)
NEUTS SEG NFR BLD: 68 % (ref 40–73)
NITRITE UR QL STRIP.AUTO: NEGATIVE
P-R INTERVAL, ECG05: 154 MS
PH UR STRIP: 5 [PH] (ref 5–8)
PLATELET # BLD AUTO: 300 K/UL (ref 135–420)
PMV BLD AUTO: 10.3 FL (ref 9.2–11.8)
POTASSIUM SERPL-SCNC: 5 MMOL/L (ref 3.5–5.5)
PROT SERPL-MCNC: 7.5 G/DL (ref 6.4–8.2)
PROT UR STRIP-MCNC: NEGATIVE MG/DL
PROTHROMBIN TIME: 13.3 SEC (ref 11.5–15.2)
Q-T INTERVAL, ECG07: 418 MS
QRS DURATION, ECG06: 68 MS
QTC CALCULATION (BEZET), ECG08: 444 MS
RBC # BLD AUTO: 3.81 M/UL (ref 4.2–5.3)
SODIUM SERPL-SCNC: 140 MMOL/L (ref 136–145)
SP GR UR REFRACTOMETRY: 1.02 (ref 1–1.03)
UROBILINOGEN UR QL STRIP.AUTO: 0.2 EU/DL (ref 0.2–1)
VENTRICULAR RATE, ECG03: 68 BPM
WBC # BLD AUTO: 7.8 K/UL (ref 4.6–13.2)

## 2021-08-10 PROCEDURE — 81003 URINALYSIS AUTO W/O SCOPE: CPT

## 2021-08-10 PROCEDURE — 36415 COLL VENOUS BLD VENIPUNCTURE: CPT

## 2021-08-10 PROCEDURE — 85730 THROMBOPLASTIN TIME PARTIAL: CPT

## 2021-08-10 PROCEDURE — 83036 HEMOGLOBIN GLYCOSYLATED A1C: CPT

## 2021-08-10 PROCEDURE — 85610 PROTHROMBIN TIME: CPT

## 2021-08-10 PROCEDURE — 85025 COMPLETE CBC W/AUTO DIFF WBC: CPT

## 2021-08-10 PROCEDURE — 80053 COMPREHEN METABOLIC PANEL: CPT

## 2021-08-10 PROCEDURE — 93005 ELECTROCARDIOGRAM TRACING: CPT

## 2021-08-10 PROCEDURE — 87086 URINE CULTURE/COLONY COUNT: CPT

## 2021-08-11 LAB
BACTERIA SPEC CULT: NORMAL
SERVICE CMNT-IMP: NORMAL

## 2021-08-12 LAB
BACTERIA SPEC CULT: NORMAL
BACTERIA SPEC CULT: NORMAL
SERVICE CMNT-IMP: NORMAL

## 2021-08-20 ENCOUNTER — HOSPITAL ENCOUNTER (OUTPATIENT)
Dept: PREADMISSION TESTING | Age: 76
Discharge: HOME | End: 2021-08-20
Payer: MEDICARE

## 2021-08-20 ENCOUNTER — TRANSCRIBE ORDER (OUTPATIENT)
Dept: REGISTRATION | Age: 76
End: 2021-08-20

## 2021-08-20 ENCOUNTER — HOSPITAL ENCOUNTER (OUTPATIENT)
Dept: GENERAL RADIOLOGY | Age: 76
Discharge: HOME | End: 2021-08-20
Payer: MEDICARE

## 2021-08-20 DIAGNOSIS — T84.033A MECHANICAL LOOSENING OF INTERNAL LEFT KNEE PROSTHETIC JOINT (HCC): Primary | ICD-10-CM

## 2021-08-20 DIAGNOSIS — T84.033A MECHANICAL LOOSENING OF INTERNAL LEFT KNEE PROSTHETIC JOINT (HCC): ICD-10-CM

## 2021-08-20 PROCEDURE — 71045 X-RAY EXAM CHEST 1 VIEW: CPT

## 2021-08-20 PROCEDURE — U0003 INFECTIOUS AGENT DETECTION BY NUCLEIC ACID (DNA OR RNA); SEVERE ACUTE RESPIRATORY SYNDROME CORONAVIRUS 2 (SARS-COV-2) (CORONAVIRUS DISEASE [COVID-19]), AMPLIFIED PROBE TECHNIQUE, MAKING USE OF HIGH THROUGHPUT TECHNOLOGIES AS DESCRIBED BY CMS-2020-01-R: HCPCS

## 2021-08-21 LAB — SARS-COV-2, COV2NT: NOT DETECTED

## 2021-09-05 ENCOUNTER — HOSPITAL ENCOUNTER (EMERGENCY)
Age: 76
Discharge: HOME | End: 2021-09-05
Attending: STUDENT IN AN ORGANIZED HEALTH CARE EDUCATION/TRAINING PROGRAM
Payer: MEDICARE

## 2021-09-05 VITALS
RESPIRATION RATE: 16 BRPM | SYSTOLIC BLOOD PRESSURE: 148 MMHG | BODY MASS INDEX: 19.66 KG/M2 | HEIGHT: 65 IN | TEMPERATURE: 97.7 F | DIASTOLIC BLOOD PRESSURE: 71 MMHG | WEIGHT: 118 LBS | HEART RATE: 56 BPM

## 2021-09-05 DIAGNOSIS — T16.2XXA FB EAR, LEFT, INITIAL ENCOUNTER: Primary | ICD-10-CM

## 2021-09-05 PROCEDURE — 75810000145 HC RMVL FB EAR W/O GEN ANES

## 2021-09-05 PROCEDURE — 99282 EMERGENCY DEPT VISIT SF MDM: CPT

## 2021-09-05 PROCEDURE — 99281 EMR DPT VST MAYX REQ PHY/QHP: CPT

## 2021-09-05 NOTE — ED PROVIDER NOTES
EMERGENCY DEPARTMENT HISTORY AND PHYSICAL EXAM    I have evaluated the patient at 11:17 AM      Date: 9/5/2021  Patient Name: Arleen Blackwood    History of Presenting Illness     Chief Complaint   Patient presents with    Foreign Body in Ear         History Provided By: Patient  Location/Duration/Severity/Modifying factors   71-year-old female presents with the cotton  of a Q-tip in her left external auditory canal.  Hearing is muffled in the affected ear. No other complaints. PCP: Opal Mahajan MD    Current Outpatient Medications   Medication Sig Dispense Refill    loratadine (CLARITIN) 10 mg tablet Take 10 mg by mouth daily as needed for Allergies.  citalopram (CeleXA) 20 mg tablet Take 20 mg by mouth daily.  DULoxetine (Cymbalta) 30 mg capsule Take 30 mg by mouth daily.  folic acid 784 mcg tablet Take 400 mcg by mouth daily.  methotrexate (RHEUMATREX) 2.5 mg tablet Take 2.5 mg by mouth Every Thursday. Indications: rheumatoid arthritis      hydrOXYchloroQUINE (PlaqueniL) 200 mg tablet Take 200 mg by mouth daily. Indications: rheumatoid arthritis      predniSONE (DELTASONE) 5 mg tablet Take 5 mg by mouth.  ALPRAZolam (XANAX) 0.25 mg tablet Take 1 Tab by mouth every eight (8) hours as needed for Anxiety. Max Daily Amount: 0.75 mg. (Patient not taking: Reported on 8/10/2021) 8 Tab 0    albuterol (PROAIR HFA) 90 mcg/actuation inhaler Take 2 Puffs by inhalation two (2) times a day. Indications: CHRONIC OBSTRUCTIVE PULMONARY DISEASE 1 Inhaler 0    esomeprazole (NEXIUM) 20 mg capsule Take 20 mg by mouth daily. Indications: GASTROESOPHAGEAL REFLUX      clonazePAM (KLONOPIN) 0.5 mg tablet Take 0.5 mg by mouth daily.  Indications: anxiety         Past History     Past Medical History:  Past Medical History:   Diagnosis Date    Anxiety and depression     anxiety, depression    Asthma     Chronic obstructive pulmonary disease ([address]     Coagulation disorder ([address]     alpha thalassemia     GERD (gastroesophageal reflux disease)     Hypertension     - no meds    Ill-defined condition     seasonal allergies    RA [facility]rheumatoid [address]) [facility] [address]        Past Surgical History:  Past Surgical History:   Procedure Laterality Date    COLONOSCOPY N/A 2021    COLONOSCOPY; POLYPECTOMY performed by Santino Shah MD at THE [facility] ENDOSCOPY    HX APPENDECTOMY      HX CATARACT REMOVAL Bilateral     HX  SECTION      x 2    HX CHOLECYSTECTOMY      HX HERNIA REPAIR      ventral    HX MOHS PROCEDURES Right     HX ORTHOPAEDIC Bilateral     knee replacement    HX SHOULDER ARTHROSCOPY Right 2006    rotator cuff repair.  HX TONSILLECTOMY         Family History:  History reviewed. No pertinent family history. Social History:  Social History     Tobacco Use    Smoking status: Never Smoker    Smokeless tobacco: Never Used   Vaping Use    Vaping Use: Never used   Substance Use Topics    Alcohol use: No    Drug use: No       Allergies: Allergies   Allergen Reactions    Latex Anaphylaxis    Aspirin Anaphylaxis     Patient has tolerated with NSAIDS    Sulfa (Sulfonamide Antibiotics) Anaphylaxis    Egg Swelling     Eye swelling    Iodinated Contrast Media Unknown (comments)    Pcn [Penicillins] Swelling         Review of Systems       Review of Systems   Constitutional: Negative for activity change, fatigue and fever. HENT: Negative for ear discharge, ear pain, facial swelling, hearing loss and trouble swallowing. FB in left ear   Respiratory: Negative for shortness of breath. Cardiovascular: Negative for chest pain. Gastrointestinal: Negative for abdominal pain, nausea and vomiting. Skin: Negative for rash and wound. Neurological: Negative for weakness, light-headedness and headaches.          Physical Exam     Visit Vitals  BP (!) 148/71 (BP 1 Location: Left upper arm, BP Patient Position: At rest)   Pulse (!) 56   Temp 97.7 °F (36.5 °C)   Resp 16 Ht 5' 5\" (1.651 m)   Wt 53.5 kg (118 lb)   BMI 19.64 kg/m²         Physical Exam  Constitutional:       General: She is not in acute distress. Appearance: She is normal weight. HENT:      Head: Normocephalic and atraumatic. Right Ear: Tympanic membrane normal.      Left Ear: Tympanic membrane normal.      Ears:      Comments: Cotton in L EAC     Mouth/Throat:      Mouth: Mucous membranes are moist.   Cardiovascular:      Rate and Rhythm: Normal rate. Pulmonary:      Effort: Pulmonary effort is normal.      Breath sounds: Normal breath sounds. Abdominal:      General: Abdomen is flat. Palpations: Abdomen is soft. Skin:     General: Skin is warm and dry. Neurological:      General: No focal deficit present. Mental Status: She is alert and oriented to person, place, and time. Psychiatric:         Mood and Affect: Mood normal.           Diagnostic Study Results     Labs -  No results found for this or any previous visit (from the past 12 hour(s)). Radiologic Studies -   No orders to display         Medical Decision Making   I am the first provider for this patient. I reviewed the vital signs, available nursing notes, past medical history, past surgical history, family history and social history. Vital Signs-Reviewed the patient's vital signs. Records Reviewed: Nursing Notes (Time of Review: 11:17 AM)    ED Course: Progress Notes, Reevaluation, and Consults:         Provider Notes (Medical Decision Making):   MDM  Number of Diagnoses or Management Options  FB ear, left, initial encounter  Diagnosis management comments: Patient had cotton from Q-tip removed using alligator forceps. No complications. TM intact.   Patient can be discharged to follow-up with her primary care doctor               Foreign Body Removal    Date/Time: 9/5/2021 11:19 AM  Performed by: Maude Parker DO  Authorized by: Maude Parker DO     Consent:     Consent obtained:  Verbal    Consent given by: Patient    Risks discussed:  Incomplete removal    Alternatives discussed:  No treatment  Location:     Location:  Ear    Ear location:  L ear    Tendon involvement:  None  Pre-procedure details:     Imaging:  None    Neurovascular status: intact    Anesthesia (see MAR for exact dosages): Anesthesia method:  None  Procedure type:     Procedure complexity:  Simple  Post-procedure details:     Neurovascular status: intact      Confirmation:  No additional foreign bodies on visualization    Patient tolerance of procedure: Tolerated well, no immediate complications  Comments:      Removed with alligator forceps              Diagnosis     Clinical Impression:   1. FB ear, left, initial encounter        Disposition: home    Follow-up Information     Follow up With Specialties Details Why Contact Info    [facility] EMERGENCY DEPT Emergency Medicine  As needed, If symptoms worsen [address] Catalino Trejo MD Family Medicine Call   [address]  [place]. [phone]             Patient's Medications   Start Taking    No medications on file   Continue Taking    ALBUTEROL (PROAIR HFA) 90 MCG/ACTUATION INHALER    Take 2 Puffs by inhalation two (2) times a day. Indications: CHRONIC OBSTRUCTIVE PULMONARY DISEASE    ALPRAZOLAM (XANAX) 0.25 MG TABLET    Take 1 Tab by mouth every eight (8) hours as needed for Anxiety. Max Daily Amount: 0.75 mg. CITALOPRAM (CELEXA) 20 MG TABLET    Take 20 mg by mouth daily. CLONAZEPAM (KLONOPIN) 0.5 MG TABLET    Take 0.5 mg by mouth daily. Indications: anxiety    DULOXETINE (CYMBALTA) 30 MG CAPSULE    Take 30 mg by mouth daily. ESOMEPRAZOLE (NEXIUM) 20 MG CAPSULE    Take 20 mg by mouth daily. Indications: GASTROESOPHAGEAL REFLUX    FOLIC ACID 014 MCG TABLET    Take 400 mcg by mouth daily. HYDROXYCHLOROQUINE (PLAQUENIL) 200 MG TABLET    Take 200 mg by mouth daily.  Indications: rheumatoid arthritis    LORATADINE (CLARITIN) 10 MG TABLET    Take 10 mg by mouth daily as needed for Allergies. METHOTREXATE (RHEUMATREX) 2.5 MG TABLET    Take 2.5 mg by mouth Every Thursday. Indications: rheumatoid arthritis    PREDNISONE (DELTASONE) 5 MG TABLET    Take 5 mg by mouth. These Medications have changed    No medications on file   Stop Taking    No medications on file     Disclaimer: Sections of this note are dictated using utilizing voice recognition software. Minor typographical errors may be present. If questions arise, please do not hesitate to contact me or call our department.

## 2021-09-10 ENCOUNTER — HOSPITAL ENCOUNTER (OUTPATIENT)
Dept: PREADMISSION TESTING | Age: 76
Discharge: HOME | End: 2021-09-10

## 2021-09-10 NOTE — PERIOP NOTES
Attempted to call patient for PAT phone appointment. Patient not available, daughter given number to reschedule PAT phone appointment.

## 2021-09-15 ENCOUNTER — HOSPITAL ENCOUNTER (OUTPATIENT)
Dept: PREADMISSION TESTING | Age: 76
Discharge: HOME | End: 2021-09-15

## 2021-09-15 VITALS — HEIGHT: 65 IN | WEIGHT: 118 LBS | BODY MASS INDEX: 19.66 KG/M2

## 2021-09-15 RX ORDER — CIPROFLOXACIN 500 MG/1
TABLET ORAL 2 TIMES DAILY
COMMUNITY

## 2021-09-15 RX ORDER — FERROUS SULFATE 324(65)MG
TABLET, DELAYED RELEASE (ENTERIC COATED) ORAL 2 TIMES DAILY WITH MEALS
COMMUNITY

## 2021-09-15 RX ORDER — SODIUM CHLORIDE, SODIUM LACTATE, POTASSIUM CHLORIDE, CALCIUM CHLORIDE 600; 310; 30; 20 MG/100ML; MG/100ML; MG/100ML; MG/100ML
125 INJECTION, SOLUTION INTRAVENOUS CONTINUOUS
Status: CANCELLED | OUTPATIENT
Start: 2021-09-15

## 2021-09-15 RX ORDER — CYCLOBENZAPRINE HCL 5 MG
5 TABLET ORAL
COMMUNITY

## 2021-09-15 RX ORDER — FLUTICASONE PROPIONATE 50 MCG
2 SPRAY, SUSPENSION (ML) NASAL DAILY
COMMUNITY

## 2021-09-15 NOTE — PERIOP NOTES
PAT phone interview completed Tabitha Schneider (daughter) with permission from patient. Patient made aware to be NPO and to quarantine. Patient made aware to come for COVID testing 9/24. Patient made aware of CXR and will come 9/16 for CXR. Reviewed surgical skin preparation with Tabitha Schneider. Tabitha Schneider stated understanding. Opportunity for questions given. Patient made aware not to wear jewelry, makeup, nail polish, lotion, oil or spray on DOS. Tabitha Schneider stated patient will have appointment with Dr. Tala Chowdhury on 9/16. Tabitha Schneider stated patient does not have a DNR order.

## 2021-09-16 ENCOUNTER — TRANSCRIBE ORDER (OUTPATIENT)
Dept: REGISTRATION | Age: 76
End: 2021-09-16

## 2021-09-16 ENCOUNTER — HOSPITAL ENCOUNTER (OUTPATIENT)
Dept: GENERAL RADIOLOGY | Age: 76
Discharge: HOME | End: 2021-09-16
Attending: INTERNAL MEDICINE
Payer: MEDICARE

## 2021-09-16 DIAGNOSIS — J45.40 MODERATE PERSISTENT ASTHMA: ICD-10-CM

## 2021-09-16 DIAGNOSIS — J18.9 UNRESOLVED PNEUMONIA: Primary | ICD-10-CM

## 2021-09-16 DIAGNOSIS — R06.00 DYSPNEA AND RESPIRATORY ABNORMALITY: ICD-10-CM

## 2021-09-16 DIAGNOSIS — R06.89 DYSPNEA AND RESPIRATORY ABNORMALITY: ICD-10-CM

## 2021-09-16 DIAGNOSIS — J96.11 CHRONIC HYPOXEMIC RESPIRATORY FAILURE (HCC): ICD-10-CM

## 2021-09-16 DIAGNOSIS — J18.9 UNRESOLVED PNEUMONIA: ICD-10-CM

## 2021-09-16 PROCEDURE — 71046 X-RAY EXAM CHEST 2 VIEWS: CPT

## 2021-09-22 ENCOUNTER — HOSPITAL ENCOUNTER (OUTPATIENT)
Dept: PREADMISSION TESTING | Age: 76
Discharge: HOME | End: 2021-09-22
Payer: MEDICARE

## 2021-09-22 ENCOUNTER — TRANSCRIBE ORDER (OUTPATIENT)
Dept: REGISTRATION | Age: 76
End: 2021-09-22

## 2021-09-22 LAB
ABO + RH BLD: NORMAL
ALBUMIN SERPL-MCNC: 3.5 G/DL (ref 3.4–5)
ALBUMIN/GLOB SERPL: 0.9 {RATIO} (ref 0.8–1.7)
ALP SERPL-CCNC: 62 U/L (ref 45–117)
ALT SERPL-CCNC: 13 U/L (ref 13–56)
ANION GAP SERPL CALC-SCNC: 4 MMOL/L (ref 3–18)
APPEARANCE UR: CLEAR
APTT PPP: 39.2 SEC (ref 23–36.4)
AST SERPL-CCNC: 19 U/L (ref 10–38)
BACTERIA URNS QL MICRO: ABNORMAL /HPF
BASOPHILS # BLD: 0 K/UL (ref 0–0.1)
BASOPHILS NFR BLD: 0 % (ref 0–2)
BILIRUB SERPL-MCNC: 0.3 MG/DL (ref 0.2–1)
BILIRUB UR QL: NEGATIVE
BLOOD GROUP ANTIBODIES SERPL: NORMAL
BUN SERPL-MCNC: 19 MG/DL (ref 7–18)
BUN/CREAT SERPL: 23 (ref 12–20)
CALCIUM SERPL-MCNC: 9.7 MG/DL (ref 8.5–10.1)
CHLORIDE SERPL-SCNC: 109 MMOL/L (ref 100–111)
CO2 SERPL-SCNC: 26 MMOL/L (ref 21–32)
COLOR UR: YELLOW
CREAT SERPL-MCNC: 0.83 MG/DL (ref 0.6–1.3)
DIFFERENTIAL METHOD BLD: ABNORMAL
EOSINOPHIL # BLD: 0.3 K/UL (ref 0–0.4)
EOSINOPHIL NFR BLD: 5 % (ref 0–5)
EPITH CASTS URNS QL MICRO: ABNORMAL /LPF (ref 0–5)
ERYTHROCYTE [DISTWIDTH] IN BLOOD BY AUTOMATED COUNT: 15 % (ref 11.6–14.5)
EST. AVERAGE GLUCOSE BLD GHB EST-MCNC: 114 MG/DL
GLOBULIN SER CALC-MCNC: 3.8 G/DL (ref 2–4)
GLUCOSE SERPL-MCNC: 68 MG/DL (ref 74–99)
GLUCOSE UR STRIP.AUTO-MCNC: NEGATIVE MG/DL
HBA1C MFR BLD: 5.6 % (ref 4.2–5.6)
HCT VFR BLD AUTO: 35.1 % (ref 35–45)
HGB BLD-MCNC: 11 G/DL (ref 12–16)
HGB UR QL STRIP: NEGATIVE
INR PPP: 1.1 (ref 0.8–1.2)
KETONES UR QL STRIP.AUTO: NEGATIVE MG/DL
LEUKOCYTE ESTERASE UR QL STRIP.AUTO: ABNORMAL
LYMPHOCYTES # BLD: 1.6 K/UL (ref 0.9–3.6)
LYMPHOCYTES NFR BLD: 23 % (ref 21–52)
MCH RBC QN AUTO: 27.8 PG (ref 24–34)
MCHC RBC AUTO-ENTMCNC: 31.3 G/DL (ref 31–37)
MCV RBC AUTO: 88.6 FL (ref 78–100)
MONOCYTES # BLD: 0.9 K/UL (ref 0.05–1.2)
MONOCYTES NFR BLD: 12 % (ref 3–10)
MUCOUS THREADS URNS QL MICRO: ABNORMAL /LPF
NEUTS SEG # BLD: 4.1 K/UL (ref 1.8–8)
NEUTS SEG NFR BLD: 59 % (ref 40–73)
NITRITE UR QL STRIP.AUTO: NEGATIVE
PH UR STRIP: 5 [PH] (ref 5–8)
PLATELET # BLD AUTO: 355 K/UL (ref 135–420)
PMV BLD AUTO: 10.3 FL (ref 9.2–11.8)
POTASSIUM SERPL-SCNC: 4.7 MMOL/L (ref 3.5–5.5)
PROT SERPL-MCNC: 7.3 G/DL (ref 6.4–8.2)
PROT UR STRIP-MCNC: NEGATIVE MG/DL
PROTHROMBIN TIME: 13.4 SEC (ref 11.5–15.2)
RBC # BLD AUTO: 3.96 M/UL (ref 4.2–5.3)
RBC #/AREA URNS HPF: ABNORMAL /HPF (ref 0–5)
SODIUM SERPL-SCNC: 139 MMOL/L (ref 136–145)
SP GR UR REFRACTOMETRY: 1.01 (ref 1–1.03)
SPECIMEN EXP DATE BLD: NORMAL
UROBILINOGEN UR QL STRIP.AUTO: 0.2 EU/DL (ref 0.2–1)
WBC # BLD AUTO: 7 K/UL (ref 4.6–13.2)
WBC URNS QL MICRO: ABNORMAL /HPF (ref 0–5)

## 2021-09-22 PROCEDURE — 85610 PROTHROMBIN TIME: CPT

## 2021-09-22 PROCEDURE — 36415 COLL VENOUS BLD VENIPUNCTURE: CPT

## 2021-09-22 PROCEDURE — 85025 COMPLETE CBC W/AUTO DIFF WBC: CPT

## 2021-09-22 PROCEDURE — 86901 BLOOD TYPING SEROLOGIC RH(D): CPT

## 2021-09-22 PROCEDURE — 87086 URINE CULTURE/COLONY COUNT: CPT

## 2021-09-22 PROCEDURE — 83036 HEMOGLOBIN GLYCOSYLATED A1C: CPT

## 2021-09-22 PROCEDURE — 80053 COMPREHEN METABOLIC PANEL: CPT

## 2021-09-22 PROCEDURE — 85730 THROMBOPLASTIN TIME PARTIAL: CPT

## 2021-09-22 PROCEDURE — 81001 URINALYSIS AUTO W/SCOPE: CPT

## 2021-09-23 LAB
BACTERIA SPEC CULT: NORMAL
SERVICE CMNT-IMP: NORMAL
SERVICE CMNT-IMP: NORMAL

## 2021-09-27 ENCOUNTER — HOSPITAL ENCOUNTER (OUTPATIENT)
Dept: PREADMISSION TESTING | Age: 76
Discharge: HOME | DRG: 468 | End: 2021-09-27
Payer: MEDICARE

## 2021-09-27 PROCEDURE — U0005 INFEC AGEN DETEC AMPLI PROBE: HCPCS

## 2021-09-28 LAB — SARS-COV-2, NAA: NOT DETECTED

## 2021-09-29 ENCOUNTER — ANESTHESIA EVENT (OUTPATIENT)
Dept: SURGERY | Age: 76
DRG: 468 | End: 2021-09-29
Payer: MEDICARE

## 2021-09-29 ENCOUNTER — HOSPITAL ENCOUNTER (INPATIENT)
Age: 76
LOS: 1 days | Discharge: HOME HEALTH | DRG: 468 | End: 2021-09-30
Attending: ORTHOPAEDIC SURGERY | Admitting: ORTHOPAEDIC SURGERY
Payer: MEDICARE

## 2021-09-29 ENCOUNTER — ANESTHESIA (OUTPATIENT)
Dept: SURGERY | Age: 76
DRG: 468 | End: 2021-09-29
Payer: MEDICARE

## 2021-09-29 ENCOUNTER — APPOINTMENT (OUTPATIENT)
Dept: GENERAL RADIOLOGY | Age: 76
DRG: 468 | End: 2021-09-29
Attending: PHYSICIAN ASSISTANT
Payer: MEDICARE

## 2021-09-29 DIAGNOSIS — Z96.652 STATUS POST REVISION OF TOTAL KNEE REPLACEMENT, LEFT: Primary | ICD-10-CM

## 2021-09-29 PROCEDURE — 74011250636 HC RX REV CODE- 250/636: Performed by: STUDENT IN AN ORGANIZED HEALTH CARE EDUCATION/TRAINING PROGRAM

## 2021-09-29 PROCEDURE — 73560 X-RAY EXAM OF KNEE 1 OR 2: CPT

## 2021-09-29 PROCEDURE — 97535 SELF CARE MNGMENT TRAINING: CPT

## 2021-09-29 PROCEDURE — 77030038552 HC DRN WND MDII -A: Performed by: ORTHOPAEDIC SURGERY

## 2021-09-29 PROCEDURE — 77030011628: Performed by: ORTHOPAEDIC SURGERY

## 2021-09-29 PROCEDURE — 77010033678 HC OXYGEN DAILY

## 2021-09-29 PROCEDURE — 77030013708 HC HNDPC SUC IRR PULS STRY –B: Performed by: ORTHOPAEDIC SURGERY

## 2021-09-29 PROCEDURE — 74011250636 HC RX REV CODE- 250/636: Performed by: NURSE ANESTHETIST, CERTIFIED REGISTERED

## 2021-09-29 PROCEDURE — 74011000258 HC RX REV CODE- 258: Performed by: ORTHOPAEDIC SURGERY

## 2021-09-29 PROCEDURE — C1776 JOINT DEVICE (IMPLANTABLE): HCPCS | Performed by: ORTHOPAEDIC SURGERY

## 2021-09-29 PROCEDURE — 77030038010: Performed by: ORTHOPAEDIC SURGERY

## 2021-09-29 PROCEDURE — 87205 SMEAR GRAM STAIN: CPT

## 2021-09-29 PROCEDURE — 74011250637 HC RX REV CODE- 250/637: Performed by: PHYSICIAN ASSISTANT

## 2021-09-29 PROCEDURE — 87075 CULTR BACTERIA EXCEPT BLOOD: CPT

## 2021-09-29 PROCEDURE — 87176 TISSUE HOMOGENIZATION CULTR: CPT

## 2021-09-29 PROCEDURE — 77030008462 HC STPLR SKN PROX J&J -A: Performed by: ORTHOPAEDIC SURGERY

## 2021-09-29 PROCEDURE — 77030027138 HC INCENT SPIROMETER -A: Performed by: ORTHOPAEDIC SURGERY

## 2021-09-29 PROCEDURE — C9290 INJ, BUPIVACAINE LIPOSOME: HCPCS | Performed by: ORTHOPAEDIC SURGERY

## 2021-09-29 PROCEDURE — 77030020268 HC MISC GENERAL SUPPLY: Performed by: ORTHOPAEDIC SURGERY

## 2021-09-29 PROCEDURE — 97166 OT EVAL MOD COMPLEX 45 MIN: CPT

## 2021-09-29 PROCEDURE — 0SPD0JZ REMOVAL OF SYNTHETIC SUBSTITUTE FROM LEFT KNEE JOINT, OPEN APPROACH: ICD-10-PCS | Performed by: ORTHOPAEDIC SURGERY

## 2021-09-29 PROCEDURE — 88305 TISSUE EXAM BY PATHOLOGIST: CPT

## 2021-09-29 PROCEDURE — 77030002916 HC SUT ETHLN J&J -A: Performed by: ORTHOPAEDIC SURGERY

## 2021-09-29 PROCEDURE — 77030000032 HC CUF TRNQT ZIMM -B: Performed by: ORTHOPAEDIC SURGERY

## 2021-09-29 PROCEDURE — 87102 FUNGUS ISOLATION CULTURE: CPT

## 2021-09-29 PROCEDURE — L1830 KO IMMOB CANVAS LONG PRE OTS: HCPCS | Performed by: ORTHOPAEDIC SURGERY

## 2021-09-29 PROCEDURE — C1713 ANCHOR/SCREW BN/BN,TIS/BN: HCPCS | Performed by: ORTHOPAEDIC SURGERY

## 2021-09-29 PROCEDURE — 77030011256 HC DRSG MEPILEX <16IN NO BORD MOLN -A: Performed by: ORTHOPAEDIC SURGERY

## 2021-09-29 PROCEDURE — 74011250636 HC RX REV CODE- 250/636: Performed by: PHYSICIAN ASSISTANT

## 2021-09-29 PROCEDURE — 77030020782 HC GWN BAIR PAWS FLX 3M -B: Performed by: ORTHOPAEDIC SURGERY

## 2021-09-29 PROCEDURE — 76060000037 HC ANESTHESIA 3 TO 3.5 HR: Performed by: ORTHOPAEDIC SURGERY

## 2021-09-29 PROCEDURE — 74011250637 HC RX REV CODE- 250/637: Performed by: STUDENT IN AN ORGANIZED HEALTH CARE EDUCATION/TRAINING PROGRAM

## 2021-09-29 PROCEDURE — 74011250636 HC RX REV CODE- 250/636: Performed by: ORTHOPAEDIC SURGERY

## 2021-09-29 PROCEDURE — 97162 PT EVAL MOD COMPLEX 30 MIN: CPT

## 2021-09-29 PROCEDURE — 65270000029 HC RM PRIVATE

## 2021-09-29 PROCEDURE — 2709999900 HC NON-CHARGEABLE SUPPLY: Performed by: ORTHOPAEDIC SURGERY

## 2021-09-29 PROCEDURE — 77030040361 HC SLV COMPR DVT MDII -B: Performed by: ORTHOPAEDIC SURGERY

## 2021-09-29 PROCEDURE — 77030004402 HC BUR NEUR STRY -C: Performed by: ORTHOPAEDIC SURGERY

## 2021-09-29 PROCEDURE — 76210000017 HC OR PH I REC 1.5 TO 2 HR: Performed by: ORTHOPAEDIC SURGERY

## 2021-09-29 PROCEDURE — 76942 ECHO GUIDE FOR BIOPSY: CPT | Performed by: ORTHOPAEDIC SURGERY

## 2021-09-29 PROCEDURE — 77030033067 HC SUT PDO STRATFX SPIR J&J -B: Performed by: ORTHOPAEDIC SURGERY

## 2021-09-29 PROCEDURE — 77030040815: Performed by: ORTHOPAEDIC SURGERY

## 2021-09-29 PROCEDURE — 3E0T3BZ INTRODUCTION OF ANESTHETIC AGENT INTO PERIPHERAL NERVES AND PLEXI, PERCUTANEOUS APPROACH: ICD-10-PCS | Performed by: STUDENT IN AN ORGANIZED HEALTH CARE EDUCATION/TRAINING PROGRAM

## 2021-09-29 PROCEDURE — 76010000133 HC OR TIME 3 TO 3.5 HR: Performed by: ORTHOPAEDIC SURGERY

## 2021-09-29 PROCEDURE — 87070 CULTURE OTHR SPECIMN AEROBIC: CPT

## 2021-09-29 PROCEDURE — 74011000250 HC RX REV CODE- 250: Performed by: STUDENT IN AN ORGANIZED HEALTH CARE EDUCATION/TRAINING PROGRAM

## 2021-09-29 PROCEDURE — 74011000250 HC RX REV CODE- 250: Performed by: ORTHOPAEDIC SURGERY

## 2021-09-29 PROCEDURE — 74011000250 HC RX REV CODE- 250: Performed by: NURSE ANESTHETIST, CERTIFIED REGISTERED

## 2021-09-29 PROCEDURE — 0SRD0J9 REPLACEMENT OF LEFT KNEE JOINT WITH SYNTHETIC SUBSTITUTE, CEMENTED, OPEN APPROACH: ICD-10-PCS | Performed by: ORTHOPAEDIC SURGERY

## 2021-09-29 PROCEDURE — 97116 GAIT TRAINING THERAPY: CPT

## 2021-09-29 DEVICE — STEM FEM L50MM DIA14MM KNEE CEM REV ATTUNE: Type: IMPLANTABLE DEVICE | Site: KNEE | Status: FUNCTIONAL

## 2021-09-29 DEVICE — SLEEVE TIB 29MM KNEE PORCOAT PARTIALLY REV SYS ATTUNE: Type: IMPLANTABLE DEVICE | Site: KNEE | Status: FUNCTIONAL

## 2021-09-29 DEVICE — COMPONENT PAT DIA35MM KNEE POLY CEM MEDIALIZED ANAT ATTUNE: Type: IMPLANTABLE DEVICE | Site: KNEE | Status: FUNCTIONAL

## 2021-09-29 DEVICE — AUGMENT FEM SZ 4 THK4MM POST KNEE CO CHROM MOLYBDENUM CEM: Type: IMPLANTABLE DEVICE | Site: KNEE | Status: FUNCTIONAL

## 2021-09-29 DEVICE — BASEPLATE TIB SZ 3 KNEE CO CHROM MOLYBDENUM TI ALLY ROT: Type: IMPLANTABLE DEVICE | Site: KNEE | Status: FUNCTIONAL

## 2021-09-29 DEVICE — IMPLANTABLE DEVICE: Type: IMPLANTABLE DEVICE | Site: KNEE | Status: FUNCTIONAL

## 2021-09-29 DEVICE — AUGMENT FEM SZ 4 THK4MM DST KNEE CO CHROM MOLYBDENUM CEM: Type: IMPLANTABLE DEVICE | Site: KNEE | Status: FUNCTIONAL

## 2021-09-29 DEVICE — CEMENT BNE 40GM FULL DOSE PMMA W/ GENT HI VISC RADPQ LNG: Type: IMPLANTABLE DEVICE | Site: KNEE | Status: FUNCTIONAL

## 2021-09-29 RX ORDER — KETAMINE HCL IN 0.9 % NACL 50 MG/5 ML
SYRINGE (ML) INTRAVENOUS AS NEEDED
Status: DISCONTINUED | OUTPATIENT
Start: 2021-09-29 | End: 2021-09-29 | Stop reason: HOSPADM

## 2021-09-29 RX ORDER — HYDROMORPHONE HYDROCHLORIDE 1 MG/ML
1 INJECTION, SOLUTION INTRAMUSCULAR; INTRAVENOUS; SUBCUTANEOUS
Status: DISCONTINUED | OUTPATIENT
Start: 2021-09-29 | End: 2021-09-30 | Stop reason: HOSPADM

## 2021-09-29 RX ORDER — SODIUM CHLORIDE 0.9 % (FLUSH) 0.9 %
5-40 SYRINGE (ML) INJECTION AS NEEDED
Status: DISCONTINUED | OUTPATIENT
Start: 2021-09-29 | End: 2021-09-30 | Stop reason: HOSPADM

## 2021-09-29 RX ORDER — SODIUM CHLORIDE, SODIUM LACTATE, POTASSIUM CHLORIDE, CALCIUM CHLORIDE 600; 310; 30; 20 MG/100ML; MG/100ML; MG/100ML; MG/100ML
50 INJECTION, SOLUTION INTRAVENOUS CONTINUOUS
Status: DISCONTINUED | OUTPATIENT
Start: 2021-09-29 | End: 2021-09-29 | Stop reason: HOSPADM

## 2021-09-29 RX ORDER — SODIUM CHLORIDE, SODIUM LACTATE, POTASSIUM CHLORIDE, CALCIUM CHLORIDE 600; 310; 30; 20 MG/100ML; MG/100ML; MG/100ML; MG/100ML
125 INJECTION, SOLUTION INTRAVENOUS CONTINUOUS
Status: DISCONTINUED | OUTPATIENT
Start: 2021-09-29 | End: 2021-09-29

## 2021-09-29 RX ORDER — CLINDAMYCIN PHOSPHATE 900 MG/50ML
900 INJECTION, SOLUTION INTRAVENOUS ONCE
Status: COMPLETED | OUTPATIENT
Start: 2021-09-29 | End: 2021-09-29

## 2021-09-29 RX ORDER — PHENYLEPHRINE HCL IN 0.9% NACL 1 MG/10 ML
SYRINGE (ML) INTRAVENOUS AS NEEDED
Status: DISCONTINUED | OUTPATIENT
Start: 2021-09-29 | End: 2021-09-29 | Stop reason: HOSPADM

## 2021-09-29 RX ORDER — FENTANYL CITRATE 50 UG/ML
50 INJECTION, SOLUTION INTRAMUSCULAR; INTRAVENOUS
Status: DISCONTINUED | OUTPATIENT
Start: 2021-09-29 | End: 2021-09-29 | Stop reason: HOSPADM

## 2021-09-29 RX ORDER — MIDAZOLAM HYDROCHLORIDE 1 MG/ML
INJECTION, SOLUTION INTRAMUSCULAR; INTRAVENOUS
Status: SHIPPED | OUTPATIENT
Start: 2021-09-29 | End: 2021-09-29

## 2021-09-29 RX ORDER — TRANEXAMIC ACID 10 MG/ML
1 INJECTION, SOLUTION INTRAVENOUS
Status: COMPLETED | OUTPATIENT
Start: 2021-09-29 | End: 2021-09-29

## 2021-09-29 RX ORDER — PROPOFOL 10 MG/ML
INJECTION, EMULSION INTRAVENOUS AS NEEDED
Status: DISCONTINUED | OUTPATIENT
Start: 2021-09-29 | End: 2021-09-29 | Stop reason: HOSPADM

## 2021-09-29 RX ORDER — LORAZEPAM 0.5 MG/1
0.5 TABLET ORAL
Status: DISCONTINUED | OUTPATIENT
Start: 2021-09-29 | End: 2021-09-30 | Stop reason: HOSPADM

## 2021-09-29 RX ORDER — GLYCOPYRROLATE 0.2 MG/ML
INJECTION INTRAMUSCULAR; INTRAVENOUS AS NEEDED
Status: DISCONTINUED | OUTPATIENT
Start: 2021-09-29 | End: 2021-09-29 | Stop reason: HOSPADM

## 2021-09-29 RX ORDER — ONDANSETRON 2 MG/ML
4 INJECTION INTRAMUSCULAR; INTRAVENOUS
Status: DISCONTINUED | OUTPATIENT
Start: 2021-09-29 | End: 2021-09-30 | Stop reason: HOSPADM

## 2021-09-29 RX ORDER — ACETAMINOPHEN 500 MG
1000 TABLET ORAL
Status: COMPLETED | OUTPATIENT
Start: 2021-09-29 | End: 2021-09-29

## 2021-09-29 RX ORDER — NALBUPHINE HYDROCHLORIDE 10 MG/ML
5 INJECTION, SOLUTION INTRAMUSCULAR; INTRAVENOUS; SUBCUTANEOUS
Status: DISCONTINUED | OUTPATIENT
Start: 2021-09-29 | End: 2021-09-29 | Stop reason: HOSPADM

## 2021-09-29 RX ORDER — NALOXONE HYDROCHLORIDE 0.4 MG/ML
0.04 INJECTION, SOLUTION INTRAMUSCULAR; INTRAVENOUS; SUBCUTANEOUS
Status: DISCONTINUED | OUTPATIENT
Start: 2021-09-29 | End: 2021-09-29 | Stop reason: HOSPADM

## 2021-09-29 RX ORDER — OXYCODONE HYDROCHLORIDE 5 MG/1
10 TABLET ORAL
Status: DISCONTINUED | OUTPATIENT
Start: 2021-09-29 | End: 2021-09-30 | Stop reason: HOSPADM

## 2021-09-29 RX ORDER — FENTANYL CITRATE 50 UG/ML
INJECTION, SOLUTION INTRAMUSCULAR; INTRAVENOUS
Status: SHIPPED | OUTPATIENT
Start: 2021-09-29 | End: 2021-09-29

## 2021-09-29 RX ORDER — OXYCODONE HYDROCHLORIDE 5 MG/1
5 TABLET ORAL
Status: DISCONTINUED | OUTPATIENT
Start: 2021-09-29 | End: 2021-09-30 | Stop reason: HOSPADM

## 2021-09-29 RX ORDER — SODIUM CHLORIDE, SODIUM LACTATE, POTASSIUM CHLORIDE, CALCIUM CHLORIDE 600; 310; 30; 20 MG/100ML; MG/100ML; MG/100ML; MG/100ML
100 INJECTION, SOLUTION INTRAVENOUS CONTINUOUS
Status: DISCONTINUED | OUTPATIENT
Start: 2021-09-29 | End: 2021-09-30 | Stop reason: HOSPADM

## 2021-09-29 RX ORDER — SODIUM CHLORIDE 0.9 % (FLUSH) 0.9 %
5-40 SYRINGE (ML) INJECTION EVERY 8 HOURS
Status: DISCONTINUED | OUTPATIENT
Start: 2021-09-29 | End: 2021-09-30 | Stop reason: HOSPADM

## 2021-09-29 RX ORDER — DEXMEDETOMIDINE HYDROCHLORIDE 100 UG/ML
INJECTION, SOLUTION INTRAVENOUS
Status: COMPLETED | OUTPATIENT
Start: 2021-09-29 | End: 2021-09-29

## 2021-09-29 RX ORDER — EPHEDRINE SULFATE/0.9% NACL/PF 50 MG/5 ML
SYRINGE (ML) INTRAVENOUS AS NEEDED
Status: DISCONTINUED | OUTPATIENT
Start: 2021-09-29 | End: 2021-09-29 | Stop reason: HOSPADM

## 2021-09-29 RX ORDER — SODIUM CHLORIDE 0.9 % (FLUSH) 0.9 %
5-40 SYRINGE (ML) INJECTION EVERY 8 HOURS
Status: DISCONTINUED | OUTPATIENT
Start: 2021-09-29 | End: 2021-09-29 | Stop reason: HOSPADM

## 2021-09-29 RX ORDER — ACETAMINOPHEN 500 MG
1000 TABLET ORAL EVERY 8 HOURS
Status: DISCONTINUED | OUTPATIENT
Start: 2021-09-29 | End: 2021-09-30 | Stop reason: HOSPADM

## 2021-09-29 RX ORDER — SODIUM CHLORIDE 0.9 % (FLUSH) 0.9 %
5-40 SYRINGE (ML) INJECTION AS NEEDED
Status: DISCONTINUED | OUTPATIENT
Start: 2021-09-29 | End: 2021-09-29 | Stop reason: HOSPADM

## 2021-09-29 RX ORDER — KETOROLAC TROMETHAMINE 15 MG/ML
15 INJECTION, SOLUTION INTRAMUSCULAR; INTRAVENOUS ONCE
Status: COMPLETED | OUTPATIENT
Start: 2021-09-29 | End: 2021-09-29

## 2021-09-29 RX ORDER — DIPHENHYDRAMINE HYDROCHLORIDE 50 MG/ML
12.5 INJECTION, SOLUTION INTRAMUSCULAR; INTRAVENOUS
Status: DISCONTINUED | OUTPATIENT
Start: 2021-09-29 | End: 2021-09-29 | Stop reason: HOSPADM

## 2021-09-29 RX ORDER — AMOXICILLIN 250 MG
1 CAPSULE ORAL 2 TIMES DAILY
Status: DISCONTINUED | OUTPATIENT
Start: 2021-09-29 | End: 2021-09-30 | Stop reason: HOSPADM

## 2021-09-29 RX ORDER — ONDANSETRON 2 MG/ML
INJECTION INTRAMUSCULAR; INTRAVENOUS AS NEEDED
Status: DISCONTINUED | OUTPATIENT
Start: 2021-09-29 | End: 2021-09-29 | Stop reason: HOSPADM

## 2021-09-29 RX ORDER — DEXAMETHASONE SODIUM PHOSPHATE 4 MG/ML
INJECTION, SOLUTION INTRA-ARTICULAR; INTRALESIONAL; INTRAMUSCULAR; INTRAVENOUS; SOFT TISSUE AS NEEDED
Status: DISCONTINUED | OUTPATIENT
Start: 2021-09-29 | End: 2021-09-29 | Stop reason: HOSPADM

## 2021-09-29 RX ORDER — DIPHENHYDRAMINE HCL 25 MG
25 CAPSULE ORAL
Status: DISCONTINUED | OUTPATIENT
Start: 2021-09-29 | End: 2021-09-30 | Stop reason: HOSPADM

## 2021-09-29 RX ORDER — NALOXONE HYDROCHLORIDE 0.4 MG/ML
0.4 INJECTION, SOLUTION INTRAMUSCULAR; INTRAVENOUS; SUBCUTANEOUS AS NEEDED
Status: DISCONTINUED | OUTPATIENT
Start: 2021-09-29 | End: 2021-09-30 | Stop reason: HOSPADM

## 2021-09-29 RX ORDER — LANOLIN ALCOHOL/MO/W.PET/CERES
1 CREAM (GRAM) TOPICAL
Status: DISCONTINUED | OUTPATIENT
Start: 2021-09-30 | End: 2021-09-30 | Stop reason: HOSPADM

## 2021-09-29 RX ORDER — DEXAMETHASONE SODIUM PHOSPHATE 10 MG/ML
INJECTION INTRAMUSCULAR; INTRAVENOUS
Status: SHIPPED | OUTPATIENT
Start: 2021-09-29 | End: 2021-09-29

## 2021-09-29 RX ORDER — LIDOCAINE HYDROCHLORIDE 20 MG/ML
INJECTION, SOLUTION EPIDURAL; INFILTRATION; INTRACAUDAL; PERINEURAL AS NEEDED
Status: DISCONTINUED | OUTPATIENT
Start: 2021-09-29 | End: 2021-09-29 | Stop reason: HOSPADM

## 2021-09-29 RX ORDER — HYDROMORPHONE HYDROCHLORIDE 1 MG/ML
0.5 INJECTION, SOLUTION INTRAMUSCULAR; INTRAVENOUS; SUBCUTANEOUS AS NEEDED
Status: DISCONTINUED | OUTPATIENT
Start: 2021-09-29 | End: 2021-09-29 | Stop reason: HOSPADM

## 2021-09-29 RX ORDER — VANCOMYCIN HYDROCHLORIDE 1 G/20ML
INJECTION, POWDER, LYOPHILIZED, FOR SOLUTION INTRAVENOUS AS NEEDED
Status: DISCONTINUED | OUTPATIENT
Start: 2021-09-29 | End: 2021-09-29 | Stop reason: HOSPADM

## 2021-09-29 RX ORDER — HYDROMORPHONE HYDROCHLORIDE 1 MG/ML
INJECTION, SOLUTION INTRAMUSCULAR; INTRAVENOUS; SUBCUTANEOUS AS NEEDED
Status: DISCONTINUED | OUTPATIENT
Start: 2021-09-29 | End: 2021-09-29 | Stop reason: HOSPADM

## 2021-09-29 RX ORDER — DULOXETIN HYDROCHLORIDE 30 MG/1
30 CAPSULE, DELAYED RELEASE ORAL DAILY
Status: DISCONTINUED | OUTPATIENT
Start: 2021-09-30 | End: 2021-09-30 | Stop reason: HOSPADM

## 2021-09-29 RX ORDER — ROPIVACAINE HYDROCHLORIDE 5 MG/ML
INJECTION, SOLUTION EPIDURAL; INFILTRATION; PERINEURAL
Status: SHIPPED | OUTPATIENT
Start: 2021-09-29 | End: 2021-09-29

## 2021-09-29 RX ADMIN — DOCUSATE SODIUM 50 MG AND SENNOSIDES 8.6 MG 1 TABLET: 8.6; 5 TABLET, FILM COATED ORAL at 22:02

## 2021-09-29 RX ADMIN — OXYCODONE 10 MG: 5 TABLET ORAL at 18:08

## 2021-09-29 RX ADMIN — Medication 100 MCG: at 14:13

## 2021-09-29 RX ADMIN — CLINDAMYCIN PHOSPHATE 900 MG: 900 INJECTION, SOLUTION INTRAVENOUS at 12:19

## 2021-09-29 RX ADMIN — Medication 10 ML: at 22:03

## 2021-09-29 RX ADMIN — Medication 100 MCG: at 14:16

## 2021-09-29 RX ADMIN — Medication 10 MG: at 12:09

## 2021-09-29 RX ADMIN — DEXAMETHASONE SODIUM PHOSPHATE 4 MG: 4 INJECTION, SOLUTION INTRAMUSCULAR; INTRAVENOUS at 12:08

## 2021-09-29 RX ADMIN — GLYCOPYRROLATE 0.1 MG: 0.2 INJECTION INTRAMUSCULAR; INTRAVENOUS at 12:13

## 2021-09-29 RX ADMIN — HYDROMORPHONE HYDROCHLORIDE 0.5 MG: 1 INJECTION, SOLUTION INTRAMUSCULAR; INTRAVENOUS; SUBCUTANEOUS at 12:26

## 2021-09-29 RX ADMIN — ROPIVACAINE HYDROCHLORIDE 20 ML: 5 INJECTION, SOLUTION EPIDURAL; INFILTRATION; PERINEURAL at 11:53

## 2021-09-29 RX ADMIN — SODIUM CHLORIDE, SODIUM LACTATE, POTASSIUM CHLORIDE, AND CALCIUM CHLORIDE: 600; 310; 30; 20 INJECTION, SOLUTION INTRAVENOUS at 14:12

## 2021-09-29 RX ADMIN — OXYCODONE 10 MG: 5 TABLET ORAL at 22:02

## 2021-09-29 RX ADMIN — PROPOFOL 130 MG: 10 INJECTION, EMULSION INTRAVENOUS at 12:11

## 2021-09-29 RX ADMIN — SODIUM CHLORIDE, SODIUM LACTATE, POTASSIUM CHLORIDE, AND CALCIUM CHLORIDE 125 ML/HR: 600; 310; 30; 20 INJECTION, SOLUTION INTRAVENOUS at 15:15

## 2021-09-29 RX ADMIN — TRANEXAMIC ACID 1 G: 10 INJECTION, SOLUTION INTRAVENOUS at 12:11

## 2021-09-29 RX ADMIN — MIDAZOLAM 2 MG: 1 INJECTION INTRAMUSCULAR; INTRAVENOUS at 11:53

## 2021-09-29 RX ADMIN — ACETAMINOPHEN 1000 MG: 500 TABLET ORAL at 18:08

## 2021-09-29 RX ADMIN — LIDOCAINE HYDROCHLORIDE 60 MG: 20 INJECTION, SOLUTION INTRAVENOUS at 12:11

## 2021-09-29 RX ADMIN — Medication 100 MCG: at 14:30

## 2021-09-29 RX ADMIN — KETOROLAC TROMETHAMINE 15 MG: 15 INJECTION, SOLUTION INTRAMUSCULAR; INTRAVENOUS at 10:06

## 2021-09-29 RX ADMIN — VANCOMYCIN HYDROCHLORIDE 750 MG: 750 INJECTION, POWDER, LYOPHILIZED, FOR SOLUTION INTRAVENOUS at 10:32

## 2021-09-29 RX ADMIN — DEXMEDETOMIDINE HYDROCHLORIDE 25 MCG: 100 INJECTION, SOLUTION INTRAVENOUS at 11:53

## 2021-09-29 RX ADMIN — DEXAMETHASONE SODIUM PHOSPHATE 4 MG: 10 INJECTION, SOLUTION INTRAMUSCULAR; INTRAVENOUS at 11:53

## 2021-09-29 RX ADMIN — SODIUM CHLORIDE, SODIUM LACTATE, POTASSIUM CHLORIDE, AND CALCIUM CHLORIDE: 600; 310; 30; 20 INJECTION, SOLUTION INTRAVENOUS at 14:52

## 2021-09-29 RX ADMIN — ONDANSETRON HYDROCHLORIDE 4 MG: 2 INJECTION INTRAMUSCULAR; INTRAVENOUS at 12:08

## 2021-09-29 RX ADMIN — ACETAMINOPHEN 1000 MG: 500 TABLET ORAL at 22:02

## 2021-09-29 RX ADMIN — Medication 25 MG: at 12:40

## 2021-09-29 RX ADMIN — Medication 100 MCG: at 12:25

## 2021-09-29 RX ADMIN — FENTANYL CITRATE 100 MCG: 50 INJECTION, SOLUTION INTRAMUSCULAR; INTRAVENOUS at 11:53

## 2021-09-29 RX ADMIN — SODIUM CHLORIDE, SODIUM LACTATE, POTASSIUM CHLORIDE, AND CALCIUM CHLORIDE 125 ML/HR: 600; 310; 30; 20 INJECTION, SOLUTION INTRAVENOUS at 16:41

## 2021-09-29 RX ADMIN — CLINDAMYCIN PHOSPHATE 900 MG: 900 INJECTION, SOLUTION INTRAVENOUS at 20:03

## 2021-09-29 RX ADMIN — APIXABAN 2.5 MG: 2.5 TABLET, FILM COATED ORAL at 22:02

## 2021-09-29 RX ADMIN — TRANEXAMIC ACID 1 G: 10 INJECTION, SOLUTION INTRAVENOUS at 14:12

## 2021-09-29 RX ADMIN — Medication 25 MG: at 12:26

## 2021-09-29 RX ADMIN — HYDROMORPHONE HYDROCHLORIDE 0.5 MG: 1 INJECTION, SOLUTION INTRAMUSCULAR; INTRAVENOUS; SUBCUTANEOUS at 12:40

## 2021-09-29 RX ADMIN — SODIUM CHLORIDE, SODIUM LACTATE, POTASSIUM CHLORIDE, AND CALCIUM CHLORIDE 125 ML/HR: 600; 310; 30; 20 INJECTION, SOLUTION INTRAVENOUS at 10:06

## 2021-09-29 RX ADMIN — ACETAMINOPHEN 1000 MG: 500 TABLET ORAL at 10:06

## 2021-09-29 RX ADMIN — SODIUM CHLORIDE, SODIUM LACTATE, POTASSIUM CHLORIDE, AND CALCIUM CHLORIDE 125 ML/HR: 600; 310; 30; 20 INJECTION, SOLUTION INTRAVENOUS at 17:25

## 2021-09-29 NOTE — ANESTHESIA PREPROCEDURE EVALUATION
Relevant Problems   No relevant active problems       Anesthetic History   No history of anesthetic complications     Pertinent negatives: No PONV       Review of Systems / Medical History  Patient summary reviewed, nursing notes reviewed and pertinent labs reviewed    Pulmonary    COPD      Shortness of breath  Asthma        Neuro/Psych         Psychiatric history (Anxiety, depression, difficulty holding still)     Cardiovascular    Hypertension            Pertinent negatives: No past MI and CHF       GI/Hepatic/Renal     GERD        Pertinent negatives: No liver disease and renal disease   Endo/Other        Arthritis  Pertinent negatives: No diabetes   Other Findings   Comments: Alpha thalassemia           Physical Exam    Airway  Mallampati: II  TM Distance: 4 - 6 cm  Neck ROM: normal range of motion   Mouth opening: Normal     Cardiovascular  Regular rate and rhythm,  S1 and S2 normal,  no murmur, click, rub, or gallop             Dental  No notable dental hx       Pulmonary  Breath sounds clear to auscultation               Abdominal  GI exam deferred       Other Findings            Anesthetic Plan    ASA: 3  Anesthesia type: general      Post-op pain plan if not by surgeon: peripheral nerve block single    Induction: Intravenous  Anesthetic plan and risks discussed with: Patient

## 2021-09-29 NOTE — PROGRESS NOTES
1649-Report received. 1915-Verbal shift change report given to Mildred Pulliam RN by Shiloh Clinton RN. Report included the following information SBAR, Kardex, Summary, Intake/Output and MAR.

## 2021-09-29 NOTE — PROGRESS NOTES
Problem: Mobility Impaired (Adult and Pediatric)  Goal: *Acute Goals and Plan of Care (Insert Text)  Description: PT goals to be met in 1 day:  Pt will be able to perform supine<>sit SBA for transfers at home. Pt will be able to perform sit<>stand SBA for increased ability to transfer at home safely. Pt will be able to participate in gt training >50' w/ RW, WBAT, L KI, GB and CGA for improved ability in home upon d/c. Pt will be able to perform stair training step to pattern, B/U rail and CGA to obtain safe entry into home upon d/c. Pt will be educated regarding HEP per MD protocol for optimal AROM/strength outcomes. Note: []  Patient has met MD mobilization critieria for d/c home   []  Recommend HH with 24 hour adult care   [x]  Benefit from additional acute PT session to address:  progress transfer training, gait training; stair training (pt limited by pain and inability to WB on L due to pain)    PHYSICAL THERAPY EVALUATION    Patient: Jamin Cochran (89 y.o. 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 Decreased AROM of L knee, dec strength of L knee, pain in L knee also impacting pt functional mobility. Pt rating pain on numerical pain scale pre/post and during session 8/10. Pt ed regarding mobility safety, WB, HEP, ice application/use, elevation, environmental safety and need to use call bell for OOB activity.   Pt able to perform supine<>sit w/ min A additional time and sit<>stand w/ min X x1-2. Safety vc required throughout session to reinforce safety. Pt able to participate in gt training using RW, GB, WBAT, L KI and min Ax1-2 w/ antalgic gt pattern. Pt was unable to WB on L and kept L foot on R while shimming R foot along floor. Pain limiting pt activity tolerance. Answered questions by pt in regards to PT and mobility. Pt left supine in bed w/ all needs within reach and B SCD reapplied. Nurse Jeanmarie Pacheco aware of session and outcomes. Recommend HHPT with responsible adult care at least 24 hours upon hospital d/c. Patient will benefit from skilled intervention to address the above impairments. Patient's rehabilitation potential is considered to be Fair  Factors which may influence rehabilitation potential include:   []         None noted  []         Mental ability/status  [x]         Medical condition  []         Home/family situation and support systems  []         Safety awareness  [x]         Pain tolerance/management  []         Other:      PLAN :  Recommendations and Planned Interventions:   [x]           Bed Mobility Training             []    Neuromuscular Re-Education  [x]           Transfer Training                   []    Orthotic/Prosthetic Training  [x]           Gait Training                          []    Modalities  [x]           Therapeutic Exercises           []    Edema Management/Control  [x]           Therapeutic Activities            [x]    Family Training/Education  [x]           Patient Education  []           Other (comment):    Frequency/Duration: Patient will be followed by physical therapy 1-2 times per day/4-7 days per week to address goals. Discharge Recommendations: Home Health  Further Equipment Recommendations for Discharge: N/A     SUBJECTIVE:   Patient stated I am better now that my pain is down.     OBJECTIVE DATA SUMMARY:     Past Medical History:   Diagnosis Date    Anxiety and depression anxiety, depression    Asthma     Chronic obstructive pulmonary disease (HCC)     Coagulation disorder (HCC)     alpha thalassemia     GERD (gastroesophageal reflux disease)     Hypertension     -- no meds    Ill-defined condition     seasonal allergies    RA (rheumatoid arthritis) ([facility] [address]      Past Surgical History:   Procedure Laterality Date    COLONOSCOPY N/A 2021    COLONOSCOPY; POLYPECTOMY performed by Anibal Benitez MD at THE [facility] ENDOSCOPY    HX APPENDECTOMY      HX CATARACT REMOVAL Bilateral     HX  SECTION      x 2    HX CHOLECYSTECTOMY      HX HERNIA REPAIR      ventral    HX MOHS PROCEDURES Right     HX ORTHOPAEDIC Bilateral     knee replacement    HX SHOULDER ARTHROSCOPY Right 2006    rotator cuff repair. HX TONSILLECTOMY       Barriers to Learning/Limitations: yes;  anesthesia  Compensate with: Visual Cues, Verbal Cues, and Tactile Cues  Home Situation:  Home Situation  Home Environment: Private residence  # Steps to Enter: 4  Rails to Enter: Yes  Hand Rails : Bilateral  One/Two Story Residence: Two story  # of Interior Steps: 12  Interior Rails: Left (transitions to R)  Lift Chair Available: No  Living Alone: No  Support Systems: Child(rosalinda)  Patient Expects to be Discharged to[de-identified] [employer]  Current DME Used/Available at Home: Da Axel, straight, Walker, rolling  Tub or Shower Type: Tub/Shower combination  Critical Behavior:  Neurologic State: Alert  Orientation Level: Oriented to place;Oriented to person;Oriented to situation  Cognition: Follows commands  Safety/Judgement: Awareness of environment  Psychosocial  Patient Behaviors: Calm; Cooperative  Purposeful Interaction: Yes  Pt Identified Daily Priority: Clinical issues (comment)  Caritas Process: Nurture loving kindness  Caring Interventions: Reassure  Reassure: Informing; Therapeutic listening  Skin Integrity: Incision (comment)  Skin Integumentary  Skin Integrity: Incision (comment)  Strength:    Strength: Generally decreased, functional  Tone & Sensation:   Tone: Normal  Sensation: Impaired (L knee)  Range Of Motion:  AROM: Generally decreased, functional  Functional Mobility:  Bed Mobility:  Supine to Sit: Additional time;Minimum assistance (vc)  Sit to Supine: Minimum assistance; Additional time (vc)  Scooting: Minimum assistance;Contact guard assistance; Additional time (vc)  Transfers:  Sit to Stand: Minimum assistance;Assist x1;Assist x2; Additional time (vc)  Stand to Sit: Minimum assistance; Additional time (vc)  Balance:   Sitting: Intact  Standing: Impaired; With support  Standing - Static: Fair;Constant support  Standing - Dynamic : Fair;Constant support  Ambulation/Gait Training:  Distance (ft): 1 Feet (ft)  Assistive Device: Walker, rolling;Gait belt;Brace/Splint  Ambulation - Level of Assistance: Minimal assistance;Assist x1;Assist x2; Additional time (vc)  Gait Abnormalities: Decreased step clearance;Shuffling gait (uses R foot under L for support)  Left Side Weight Bearing: As tolerated  Base of Support: Shift to right  Stance: Weight shift;Time;Left decreased  Speed/Addie: Slow;Shuffled  Step Length: Left shortened;Right shortened  Swing Pattern: Left asymmetrical;Right asymmetrical  Interventions: Safety awareness training; Tactile cues; Verbal cues; Visual/Demos    Therapeutic Exercises:   Encouraged ankle pumps  Pain:  Pain level pre-treatment: 8/10   Pain level post-treatment: 8/10   Pain Intervention(s) : Medication (see MAR); Rest, Ice, Repositioning  Response to intervention: Nurse notified, See doc flow    Activity Tolerance:   Fair -  Please refer to the flowsheet for vital signs taken during this treatment.   After treatment:   []         Patient left in no apparent distress sitting up in chair  [x]         Patient left in no apparent distress in bed  [x]         Call bell left within reach  [x]         Nursing notified  []         Caregiver present  []         Bed alarm activated  [x]         SCDs applied    COMMUNICATION/EDUCATION:   [x]         Role of Physical Therapy in the acute care setting. [x]         Fall prevention education was provided and the patient/caregiver indicated understanding. [x]         Patient/family have participated as able in goal setting and plan of care. [x]         Patient/family agree to work toward stated goals and plan of care. []         Patient understands intent and goals of therapy, but is neutral about his/her participation. []         Patient is unable to participate in goal setting/plan of care: ongoing with therapy staff.  []         Other:     Thank you for this referral.  Gisella Carias, PT   Time Calculation: 23 mins      Eval Complexity: History: HIGH Complexity :3+ comorbidities / personal factors will impact the outcome/ POC Exam:MEDIUM Complexity : 3 Standardized tests and measures addressing body structure, function, activity limitation and / or participation in recreation  Presentation: MEDIUM Complexity : Evolving with changing characteristics  Clinical Decision Making:Medium Complexity    Overall Complexity:MEDIUM

## 2021-09-29 NOTE — INTERVAL H&P NOTE
Update History & Physical    The Patient's History and Physical of today was reviewed with the patient and I examined the patient. There was no change. The surgical site was confirmed by the patient and me. Plan:  The risk, benefits, expected outcome, and alternative to the recommended procedure have been discussed with the patient. Patient understands and wants to proceed with the procedure.     Electronically signed by Ruchi Chau MD on 9/29/2021 at 9:21 AM

## 2021-09-29 NOTE — PERIOP NOTES
TRANSFER - OUT REPORT:    Verbal report given to [facility] RN on Cody Garcia  being transferred to  for routine post - op       Report consisted of patients Situation, Background, Assessment and   Recommendations(SBAR). Information from the following report(s) SBAR, Kardex, Intake/Output, MAR, Recent Results, Med Rec Status and Cardiac Rhythm NSR was reviewed with the receiving nurse. Lines:   Peripheral IV 09/29/21 Left Forearm (Active)   Site Assessment Clean, dry, & intact 09/29/21 1600   Phlebitis Assessment 0 09/29/21 1600   Infiltration Assessment 0 09/29/21 1600   Dressing Status Clean, dry, & intact 09/29/21 1600   Dressing Type Transparent 09/29/21 1600   Hub Color/Line Status Infusing 09/29/21 1600        Opportunity for questions and clarification was provided.       Patient transported with:   O2 @ 3 liters  Registered Nurse       Intake/Output Summary (Last 24 hours) at 9/29/2021 1644  Last data filed at 9/29/2021 1615  Gross per 24 hour   Intake 2300 ml   Output 265 ml   Net 2035 ml

## 2021-09-29 NOTE — ANESTHESIA POSTPROCEDURE EVALUATION
Post-Anesthesia Evaluation and Assessment    Cardiovascular Function/Vital Signs  Visit Vitals  BP (!) 106/52   Pulse 66   Temp 36.3 °C (97.4 °F)   Resp 18   SpO2 100%       Patient is status post Procedure(s):  REVISION LEFT TOTAL KNEE ARTHROPLASTY, AND ALL INDICATED PROCEDURES. Nausea/Vomiting: Controlled. Postoperative hydration reviewed and adequate. Pain:  Pain Scale 1: FLACC (09/29/21 1630)  Pain Intensity 1: 0 (09/29/21 1630)   Managed. Neurological Status:   Neuro (WDL): Within Defined Limits (09/29/21 1555)   At baseline. Mental Status and Level of Consciousness: Baseline and appropriate for discharge. Pulmonary Status:   O2 Device: Nasal cannula (09/29/21 1708)   Adequate oxygenation and airway patent. Complications related to anesthesia: None    Post-anesthesia assessment completed. No concerns. Patient has met all discharge requirements.     Signed By: Cleveland Gilliam MD    September 29, 2021

## 2021-09-29 NOTE — H&P
H&P Note    Assessment:   77 y.o. female admitted on 2021 for LOOSENING LEFT KNEE REPLACEMENT    Plan:     -Analgesia  -to OR for Revision LEFT TKA  -consent obtained    Subjective:     Slime Giles has Left knee pain following Left TKA in the past. She  is a 77 y.o. female admitted on 2021 for [address] She has failed conservative Rx and would like surgical intervention. Allergies   Allergen Reactions    Latex Anaphylaxis    Aspirin Anaphylaxis     Patient has tolerated with NSAIDS    Sulfa (Sulfonamide Antibiotics) Anaphylaxis    Egg Swelling     Eye swelling    Iodinated Contrast Media Unknown (comments)    Pcn [Penicillins] Swelling       [unfilled]    Past Medical History:   Diagnosis Date    Anxiety and depression     anxiety, depression    Asthma     Chronic obstructive pulmonary disease (HCC)     Coagulation disorder (HCC)     alpha thalassemia     GERD (gastroesophageal reflux disease)     Hypertension     - no meds    Ill-defined condition     seasonal allergies    RA (rheumatoid arthritis) ([facility] [address]      Past Surgical History:   Procedure Laterality Date    COLONOSCOPY N/A 2021    COLONOSCOPY; POLYPECTOMY performed by Christian Hassan MD at [facility] ENDOSCOPY    HX APPENDECTOMY      HX CATARACT REMOVAL Bilateral     HX  SECTION      x 2    HX CHOLECYSTECTOMY      HX HERNIA REPAIR      ventral    HX MOHS PROCEDURES Right     HX ORTHOPAEDIC Bilateral     knee replacement    HX SHOULDER ARTHROSCOPY Right 2006    rotator cuff repair.  HX TONSILLECTOMY       No family history on file. [unfilled]    Review of Systems:  General--Negative for fatigue, weight loss, anorexia  Cardiovascular--Negative for CP, orthopnea, PND  Endocrine--Negative for polyuria, polydipsia, cold intolerance    There were no vitals filed for this visit. Physical Exam: General Appearance: Alert and Oriented x3. No acute distress. Conversant. Age-appropriate. Normal mood and affect. Normal inspiratory and expiratory effort. Musculoskeletal: LEFT Lower extremity:  Skin: intact   Tenderness to palpation left knee  Motor intact knee flexion/extension, ankle plantarflexion and dorsiflexion, toes flex and extend. Sensory intact L4-S1  Posterior Tibial Pulse 2+, Dorsalis Pedis Pulse 2+, Capillary Refill <2  Compartments soft  No significant edema  Negative Katy's Sign      No results for input(s): WBC, HCT, MCV, HCTEXT in the last 72 hours. No lab exists for component: HEMOGLOBIN, PLATELET  No results for input(s): NA, POTASSIUM, CHLORIDE, CO2, BUN, INR, INREXT in the last 72 hours. No lab exists for component: ANIONGAP, CALCIUM, CREAT, GLUCOSE  No results for input(s): ALBUMIN, AGRAT in the last 72 hours. No lab exists for component: TOTPR, SGOTAST, ALKPHOS, BILIT, BILID, SGPTALT, GLOBULIN  No results for input(s): UGLU in the last 72 hours. No lab exists for component: Noretta Leys, UKET, USPG, UOCB, UPH, UPSC, UUROBILISQ, UNITR, URINELEUKOC  @ZGLUCOSEBEDSIDE(glupoc:8)@    Radiographs:Subsidence fo tibial component of Left TKA.     Myrtle Villalta MD

## 2021-09-29 NOTE — BRIEF OP NOTE
Brief Postoperative Note    Patient: Jamin Cochran  YOB: 1945  MRN: [id number]    Date of Procedure: 9/29/2021     Pre-Op Diagnosis: LOOSENING LEFT KNEE REPLACEMENT    Post-Op Diagnosis: Same as preoperative diagnosis. Procedure(s):  REVISION LEFT TOTAL KNEE ARTHROPLASTY, AND ALL INDICATED PROCEDURES    Surgeon(s):  Dorothy Romano MD    Surgical Assistant: Physician Assistant: Hallie Longo PA-C  Surg Asst-1: Ana Velasquez    Anesthesia: Regional     Estimated Blood Loss (mL): 700     Complications: None    Specimens:   ID Type Source Tests Collected by Time Destination   1 : tissue left knee Preservative Knee, left  Dorothy Romano MD 9/29/2021 1243 Pathology   1 : SWAB LEFT KNEE Joint Fluid Joint, Knee CULTURE, ANAEROBIC, GRAM STAIN, CULTURE, FUNGUS, CULTURE, BODY FLUID Dorothy Romano MD 9/29/2021 1007 Microbiology   2 : SWAB LEFT KNEE Joint Fluid Joint, Knee CULTURE, ANAEROBIC, CULTURE, FUNGUS, CULTURE, BODY FLUID, GRAM STAIN Dorothy Romano MD 9/29/2021 1007 Microbiology   3 : SWAB LEFT KNEE Joint Fluid Joint, Knee CULTURE, ANAEROBIC, CULTURE, FUNGUS, CULTURE, BODY FLUID, Yefri Mora MD 9/29/2021 1009 Microbiology   4 : tissue lef tknee Tissue Knee, left CULTURE, ANAEROBIC, CULTURE, TISSUE W Yefri Mora MD 9/29/2021 1147 Microbiology        Implants:   Implant Name Type Inv.  Item Serial No.  Lot No. LRB No. Used Action   CEMENT BNE 40GM FULL DOSE PMMA W/ GENT HI VISC RADPQ LNG - [id number]  CEMENT BNE 40GM FULL DOSE PMMA W/ GENT HI VISC RADPQ LNG  [facility] [employer]_ [id number] Left 2 Implanted   COMPONENT FEM SZ 4 L KNEE CO CHROM MOLYBDENUM MAIKEL W/ ASYM - [id number]  COMPONENT FEM SZ 4 L KNEE CO CHROM MOLYBDENUM MAIKEL W/ ASYM  [facility] [employer]_WD [id number] Left 1 Implanted   BASEPLATE TIB SZ 3 KNEE CO CHROM MOLYBDENUM TI ALLY ROT - [id number]  BASEPLATE TIB SZ 3 KNEE CO CHROM MOLYBDENUM TI ALLY ROT  [email or web address] [id number] Left 1 Implanted   SLEEVE TIB 29MM KNEE PORCOAT PARTIALLY REV SYS ATTUNE - [id number]  SLEEVE TIB 29MM KNEE PORCOAT PARTIALLY REV SYS ATTUNE  [email or web address] [id number] Left 1 Implanted   AUGMENT FEM SZ 4 THK4MM DST KNEE CO CHROM MOLYBDENUM MAIKEL - [id number]  AUGMENT FEM SZ 4 THK4MM DST KNEE CO CHROM MOLYBDENUM MAIKEL  [email or web address] [id number] Left 2 Implanted   AUGMENT FEM SZ 4 THK4MM POST KNEE CO CHROM MOLYBDENUM MAIKEL - [id number]  AUGMENT FEM SZ 4 THK4MM POST KNEE CO CHROM MOLYBDENUM MAIKEL  [email or web address] [id number] Left 1 Implanted   COMPONENT FEM AUG MAIKEL 4 8 MM POST KNEE REV COCR MOLYBDENUM - [id number]  COMPONENT FEM AUG MAIKEL 4 8 MM POST KNEE REV COCR MOLYBDENUM  [email or web address] [id number] Left 1 Implanted   STEM FEM L50MM [id number] KNEE MAIKEL REV ATTUNE - [id number]  STEM FEM L50MM [id number] KNEE MAIKEL REV ATTUNE  [email or web address] [id number] Left 1 Implanted   COMPONENT PAT [id number] KNEE POLY MAIKEL MEDIALIZED GABY ATTUNE - [id number]  COMPONENT PAT [id number] KNEE POLY MAIKEL MEDIALIZED GABY ATTUNE  [email or web address] [id number] Left 1 Implanted   STEM FEM L50MM [id number] KNEE MAIKEL REV ATTUNE - [id number]  STEM FEM L50MM [id number] KNEE MAIKEL REV ATTUNE  [email or web address] [id number] Left 1 Implanted   INSERT TIB SZ 4 [id number] ROT PLATFRM KNEE UHMWPE ANTIOXIDANT - [id number]  INSERT TIB SZ 4 [id number] ROT PLATFRM KNEE UHMWPE ANTIOXIDANT  [email or web address] [id number] Left 1 Implanted       Drains:   Sam-Colbert Drain 09/29/21 Anterior; Left Knee (Active)   Site Assessment Clean, dry, & intact 09/29/21 1413   Dressing Status Clean, dry, & intact 09/29/21 1413   Status Patent; Charged 09/29/21 1413   Drainage Color Serosanguinous 09/29/21 1413       Findings: Loose tibial component, osteolysis    Electronically Signed by Epifanio Harris MD on 9/29/2021 at 2:27 PM

## 2021-09-29 NOTE — PROGRESS NOTES
Problem: Self Care Deficits Care Plan (Adult)  Goal: *Acute Goals and Plan of Care (Insert Text)  Description: Initial Occupational Therapy Goals (9/29/2021) Within 7 day(s):    1. Patient will perform grooming standing sinkside with supervision for increased independence with ADLs. 2. Patient will perform LB dressing with supervision & A/E PRN for increased independence with ADLs. 3. Patient will perform toilet transfer with supervision for increased independence with ADLs. 4. Patient will perform all aspects of toileting with supervision for increased independence with ADLs. 5. Patient will independently apply energy conservation techniques with 1 verbal cue(s)for increased independence with ADLs. 6. Patient will perform bathroom mobility with supervision for increased independence/safety with ADLs. Outcome: Progressing Towards Goal  OCCUPATIONAL THERAPY EVALUATION    Patient: Aimee Thompson (10 y.o. female)  Date: 9/29/2021  Primary Diagnosis: Status post revision of total knee replacement, left [Z96.652]  Procedure(s) (LRB):  REVISION LEFT TOTAL KNEE ARTHROPLASTY, AND ALL INDICATED PROCEDURES (Left) Day of Surgery   Precautions: Fall, WBAT  PLOF: pt mod I for ADLs/functional mobility, ambulated with walker, lives with daughter    ASSESSMENT AND RECOMMENDATIONS:  Based on the objective data described below, the patient presents with LLE decreased ROM and strength affecting LE ADLs. Pt seen with PT for additional set of skilled hands. Pt found supine in bed, vitals assessed and WNL, pt reporting pain 6/10, KI donned to LLE. Pt sat up to EOB with min A/additional time to complete. Educated pt on proper body mechanics s/p TKR including adaptive strategies for lower body ADLs, along with clothing modifications for increased independence. Pt performed STS with min Ax2, and pt supporting LLE by placing RLE under foot, pt reports she is not ready to put weight through LLE.  Pt able to pivot on RLE towards L and return to seated. Pt required min A for LLE to return to supine, call bell/phone within reach. Patient will benefit from skilled Occupational Therapy intervention to maximize safety/independence with ADLs at d/c.    Education: Reviewed home safety, body mechanics, importance of moving every hour to prevent joint stiffness, role of ice for edema/pain control, Rolling Walker management/safety, and adaptive dressing techniques with patient verbalizing  understanding at this time     Patient will benefit from skilled intervention to address the above impairments. Patient's rehabilitation potential is considered to be Good  Factors which may influence rehabilitation potential include:   []             None noted  []             Mental ability/status  []             Medical condition  []             Home/family situation and support systems  []             Safety awareness  [x]             Pain tolerance/management  []             Other:        PLAN :  Recommendations and Planned Interventions:   [x]               Self Care Training                  [x]      Therapeutic Activities  [x]               Functional Mobility Training   []      Cognitive Retraining  [x]               Therapeutic Exercises           []      Endurance Activities  [x]               Balance Training                    []      Neuromuscular Re-Education  []               Visual/Perceptual Training     [x]      Home Safety Training  [x]               Patient Education                   [x]      Family Training/Education  []               Other (comment):    Frequency/Duration: Patient will be followed by Occupational Therapy 1-2 times per day/4-7 days per week to address goals. Discharge Recommendations: Home health with family assist  Further Equipment Recommendations for Discharge: N/A     SUBJECTIVE:   Patient stated I don't think I cant stand on that leg yet.     OBJECTIVE DATA SUMMARY:     Past Medical History:   Diagnosis Date    Anxiety and depression     anxiety, depression    Asthma     Chronic obstructive pulmonary disease (HCC)     Coagulation disorder (HCC)     alpha thalassemia     GERD (gastroesophageal reflux disease)     Hypertension     - no meds    Ill-defined condition     seasonal allergies    RA (rheumatoid arthritis) ([facility] [address]      Past Surgical History:   Procedure Laterality Date    COLONOSCOPY N/A 2021    COLONOSCOPY; POLYPECTOMY performed by Les Patel MD at [facility]OSCOPY    HX APPENDECTOMY      HX CATARACT REMOVAL Bilateral     HX  SECTION      x 2    HX CHOLECYSTECTOMY      HX HERNIA REPAIR      ventral    HX MOHS PROCEDURES Right     HX ORTHOPAEDIC Bilateral     knee replacement    HX SHOULDER ARTHROSCOPY Right 2006    rotator cuff repair. HX TONSILLECTOMY       Barriers to Learning/Limitations: yes;  physical and altered mental status (i.e.Sedation, Confusion)  Compensate with: visual, verbal, tactile, kinesthetic cues/model    Home Situation/Prior Level of Function:   Home Situation  Home Environment: Private residence  # Steps to Enter: 4  Rails to Enter: Yes  Hand Rails : Bilateral  One/Two Story Residence: Two story  # of Interior Steps: 12  Interior Rails: Left (transitions to [employer])  Lift Chair Available: No  Living Alone: No  Support Systems: Child(rosalinda)  Patient Expects to be Discharged to[de-identified] [employer]  Current DME Used/Available at Home: Cane, straight, Walker, rolling  Tub or Shower Type: Tub/Shower combination  []  Right hand dominant   []  Left hand dominant    Cognitive/Behavioral Status:  Neurologic State: Alert  Orientation Level: Oriented to place;Oriented to person;Oriented to situation  Cognition: Follows commands  Safety/Judgement: Awareness of environment    Skin: L knee incision w/ Mepilex   Edema: compression hose in place & applied ice     Coordination: BUE  Coordination: Within functional limits  Fine Motor Skills-Upper: Left Intact; Right Intact    Gross Motor Skills-Upper: Left Intact; Right Intact    Balance:  Sitting: Intact  Standing: Impaired; With support  Standing - Static: Fair;Constant support  Standing - Dynamic : Fair;Constant support    Strength: BUE  Strength: Generally decreased, functional    Tone & Sensation:BUE  Tone: Normal  Sensation: Impaired (L knee)    Range of Motion: BUE  AROM: Generally decreased, functional    Functional Mobility and Transfers for ADLs:  Bed Mobility:  Supine to Sit: Additional time;Minimum assistance (vc)  Sit to Supine: Minimum assistance; Additional time (vc)  Scooting: Minimum assistance;Contact guard assistance; Additional time (vc)  Transfers:  Sit to Stand: Minimum assistance;Assist x1;Assist x2; Additional time (vc)    ADL Assessment:  Feeding: Independent  Oral Facial Hygiene/Grooming: Stand-by assistance (seated)  Bathing: Moderate assistance  Upper Body Dressing: Stand-by assistance  Lower Body Dressing: Maximum assistance  Toileting: Minimum assistance    ADL Intervention:  Lower Body Dressing Assistance  Dressing Assistance: Maximum assistance  Socks: Maximum assistance  Leg Crossed Method Used: No  Position Performed: Seated edge of bed  Cues: Verbal cues provided;Visual cues provided    Cognitive Retraining  Safety/Judgement: Awareness of environment    Pain:  Pain level pre-treatment: 7/10  Pain level post-treatment: 7/10  Pain Intervention(s): Medication administer by Nursing (see MAR); Rest, Ice, Repositioning   Response to intervention: Nurse notified, see doc flow     Activity Tolerance:   Fair-. Patient able to stand ~2 minute(s). Patient able to complete ADLs with intermittent rest breaks. Patient limited by pain, strength, ROM, balance. Patient unsteady. Please refer to the flowsheet for vital signs taken during this treatment.   After treatment:   []  Patient left in no apparent distress sitting up in chair  []  Patient sitting on EOB  [x]  Patient left in no apparent distress in bed  [x]  Call bell left within reach  [x] Nursing notified  []  Caregiver present  [x]  Ice applied  [x]  SCD's on while back in bed  [] Bed alarm activated    COMMUNICATION/EDUCATION:   Communication/Collaboration:  [x]       Role of Occupational Therapy in the acute care setting. [x]      Home safety education was provided and the patient/caregiver indicated understanding. [x]      Patient/family have participated as able in goal setting and plan of care. [x]      Patient/family agree to work toward stated goals and plan of care. []      Patient understands intent and goals of therapy, but is neutral about his/her participation. []      Patient is unable to participate in plan of care at this time. Thank you for this referral.  Jarod Owens, OTR/L  Time Calculation: 23 mins    Eval Complexity: History: MEDIUM Complexity : Expanded review of history including physical, cognitive and psychosocial  history ; Examination: MEDIUM Complexity : 3-5 performance deficits relating to physical, cognitive , or psychosocial skils that result in activity limitations and / or participation restrictions; Decision Making:MEDIUM Complexity : Patient may present with comorbidities that affect occupational performnce.  Miniml to moderate modification of tasks or assistance (eg, physical or verbal ) with assesment(s) is necessary to enable patient to complete evaluation

## 2021-09-29 NOTE — PERIOP NOTES
No answer on provided phone number for family update post surgery. Voicemail left with no patient identifiers.

## 2021-09-29 NOTE — PERIOP NOTES
Pt. Used restroom in pre-op area with assistance. Patient placed on Jose Paws for a minimum of 30 min in  Preop.

## 2021-09-29 NOTE — ANESTHESIA PROCEDURE NOTES
Peripheral Block    Start time: 9/29/2021 11:50 AM  End time: 9/29/2021 11:53 AM  Performed by: Reji Herzog MD  Authorized by: Reji Herzog MD       Pre-procedure: Indications: at surgeon's request and post-op pain management    Preanesthetic Checklist: patient identified, risks and benefits discussed, site marked, timeout performed, anesthesia consent given and patient being monitored    Timeout Time: 11:50 EDT          Block Type:   Block Type: Adductor canal block  Laterality:  Left  Monitoring:  Standard ASA monitoring, responsive to questions, oxygen, continuous pulse ox and frequent vital sign checks  Injection Technique:  Single shot  Procedures: ultrasound guided    Patient Position: supine  Prep: chlorhexidine    Location:  Mid thigh  Needle Type:  Stimuplex  Needle Gauge:  20 G  Needle Localization:  Ultrasound guidance  Medication Injected:  FentaNYL citrate (PF) injection sedation initial, 100 mcg  midazolam (VERSED) injection, 2 mg  ropivacaine (PF) (NAROPIN) 5 mg/mL (0.5 %) injection, 20 mL  dexamethasone (PF) (DECADRON) 10 mg/mL injection, 4 mg  dexmedeTOMidine (PRECEDEX) 100 mcg/mL iv solution, 25 mcg  Med Admin Time: 9/29/2021 11:53 AM    Assessment:  Number of attempts:  1  Injection Assessment:  Incremental injection every 5 mL, negative aspiration for CSF, no paresthesia, ultrasound image on chart, local visualized surrounding nerve on ultrasound, negative aspiration for blood and no intravascular symptoms  Patient tolerance:  Patient tolerated the procedure well with no immediate complications  Ultrasound guidance was used to view and verify medication disbursement and was also used for needle placement.

## 2021-09-29 NOTE — PERIOP NOTES
New sanguinous drainage noted on dressing. MD called to bedside. Per MD- hemovac removed and dressing replaced. VSS. Will continue to monitor.

## 2021-09-29 NOTE — PROGRESS NOTES
Transferred patient to [facility] via bed.  VSS     09/29/21 1708   Vital Signs   Temp 97.4 °F (36.3 °C)   Temp Source Oral   Pulse (Heart Rate) 66   Resp Rate 18   BP (!) 106/52   Oxygen Therapy   O2 Sat (%) 100 %   O2 Device Nasal cannula   O2 Flow Rate (L/min) 3 l/min

## 2021-09-29 NOTE — PROGRESS NOTES
Immobilizer with ambulation    Light ROM only 0-90 when seated    Leave Hemovac in place until order for removal.  Record output each shift.

## 2021-09-30 VITALS
OXYGEN SATURATION: 100 % | DIASTOLIC BLOOD PRESSURE: 68 MMHG | RESPIRATION RATE: 18 BRPM | SYSTOLIC BLOOD PRESSURE: 138 MMHG | HEART RATE: 83 BPM | TEMPERATURE: 97.7 F

## 2021-09-30 PROCEDURE — 74011250636 HC RX REV CODE- 250/636: Performed by: PHYSICIAN ASSISTANT

## 2021-09-30 PROCEDURE — 2709999900 HC NON-CHARGEABLE SUPPLY

## 2021-09-30 PROCEDURE — 74011250637 HC RX REV CODE- 250/637: Performed by: PHYSICIAN ASSISTANT

## 2021-09-30 PROCEDURE — 77030040361 HC SLV COMPR DVT MDII -B

## 2021-09-30 PROCEDURE — 97116 GAIT TRAINING THERAPY: CPT

## 2021-09-30 PROCEDURE — 97530 THERAPEUTIC ACTIVITIES: CPT

## 2021-09-30 PROCEDURE — 77030012893

## 2021-09-30 PROCEDURE — 77010033678 HC OXYGEN DAILY

## 2021-09-30 RX ORDER — OXYCODONE AND ACETAMINOPHEN 5; 325 MG/1; MG/1
1-2 TABLET ORAL
Qty: 56 TABLET | Refills: 0 | Status: SHIPPED | OUTPATIENT
Start: 2021-09-30 | End: 2021-10-30

## 2021-09-30 RX ADMIN — OXYCODONE 10 MG: 5 TABLET ORAL at 02:54

## 2021-09-30 RX ADMIN — FERROUS SULFATE TAB 325 MG (65 MG ELEMENTAL FE) 325 MG: 325 (65 FE) TAB at 09:30

## 2021-09-30 RX ADMIN — HYDROMORPHONE HYDROCHLORIDE 1 MG: 1 INJECTION, SOLUTION INTRAMUSCULAR; INTRAVENOUS; SUBCUTANEOUS at 05:48

## 2021-09-30 RX ADMIN — ACETAMINOPHEN 1000 MG: 500 TABLET ORAL at 14:01

## 2021-09-30 RX ADMIN — APIXABAN 2.5 MG: 2.5 TABLET, FILM COATED ORAL at 09:30

## 2021-09-30 RX ADMIN — HYDROMORPHONE HYDROCHLORIDE 1 MG: 1 INJECTION, SOLUTION INTRAMUSCULAR; INTRAVENOUS; SUBCUTANEOUS at 11:59

## 2021-09-30 RX ADMIN — DULOXETINE HYDROCHLORIDE 30 MG: 30 CAPSULE, DELAYED RELEASE ORAL at 09:30

## 2021-09-30 RX ADMIN — OXYCODONE 10 MG: 5 TABLET ORAL at 14:01

## 2021-09-30 RX ADMIN — OXYCODONE 5 MG: 5 TABLET ORAL at 09:29

## 2021-09-30 RX ADMIN — DOCUSATE SODIUM 50 MG AND SENNOSIDES 8.6 MG 1 TABLET: 8.6; 5 TABLET, FILM COATED ORAL at 09:30

## 2021-09-30 RX ADMIN — ACETAMINOPHEN 1000 MG: 500 TABLET ORAL at 05:49

## 2021-09-30 NOTE — PROGRESS NOTES
Problem: Mobility Impaired (Adult and Pediatric)  Goal: *Acute Goals and Plan of Care (Insert Text)  Description: PT goals to be met in 1 day:  Pt will be able to perform supine<>sit SBA for transfers at home. Pt will be able to perform sit<>stand SBA for increased ability to transfer at home safely. Pt will be able to participate in gt training >50' w/ RW, WBAT, L KI, GB and CGA for improved ability in home upon d/c. Pt will be able to perform stair training step to pattern, B/U rail and CGA to obtain safe entry into home upon d/c. Pt will be educated regarding HEP per MD protocol for optimal AROM/strength outcomes. 9/30/2021 1311 by Simeon Thompson PTA  Outcome: Progressing Towards Goal  9/30/2021 0933 by Simeon Thompson PTA  Outcome: Progressing Towards Goal   [x]  Patient has met MD mobilization critieria for d/c home   [x]  Recommend HH with 24 hour adult care   []  Benefit from additional acute PT session to address:      PHYSICAL THERAPY TREATMENT    Patient: Fawn Walker (51 y.o. female)  Date: 9/30/2021  Diagnosis: Status post revision of total knee replacement, left [Z96.652] <principal problem not specified>  Procedure(s) (LRB):  REVISION LEFT TOTAL KNEE ARTHROPLASTY, AND ALL INDICATED PROCEDURES (Left) 1 Day Post-Op  Precautions: Fall, WBAT  PLOF: has a RW    ASSESSMENT:  Mendez MORALES. Pt utilized self assist tech to assist LLE out of bed. Sit to stand performed with bed in lowest position and without assistance. Ambulated 80ft with RW, step to gt pattern, steady slow pace, no LOB or path deviations. Negotiated a 6\" box step 2x with RW. Voided 500cc in bathroom before returning to bed. Again self assist tech utilized to assist LLE into bed.   Progression toward goals:   [x]      Improving appropriately and progressing toward goals  []      Improving slowly and progressing toward goals  []      Not making progress toward goals and plan of care will be adjusted PLAN:  Patient continues to benefit from skilled intervention to address the above impairments. Continue treatment per established plan of care. Discharge Recommendations:  Home Health  Further Equipment Recommendations for Discharge:  rolling walker     SUBJECTIVE:   Patient stated Miles Kay.     OBJECTIVE DATA SUMMARY:   Critical Behavior:  Neurologic State: Alert  Orientation Level: Oriented X4  Cognition: Appropriate for age attention/concentration  Safety/Judgement: Awareness of environment  Functional Mobility Training:  Bed Mobility:    Supine to Sit: Modified independent; Additional time  Sit to Supine: Modified independent; Additional time  Scooting: Supervision  Transfers:  Sit to Stand: Supervision; Additional time  Stand to Sit: Supervision; Additional time  Balance:  Sitting: Intact  Standing: Intact; With support  Standing - Static: Fair  Standing - Dynamic : Fair   Ambulation/Gait Training:  Distance (ft): 80 Feet (ft)  Assistive Device: Gait belt;Walker, rolling  Ambulation - Level of Assistance: Supervision  Gait Abnormalities: Antalgic; Step to gait; Decreased step clearance  Left Side Weight Bearing: As tolerated  Speed/Addie: Slow  Step Length: Right shortened;Left shortened        Pain:  Pain level pre-treatment: 7/10  Pain level post-treatment: 5/10   Pain Intervention(s): Medication (see MAR); Rest, Ice, Repositioning   Response to intervention: Nurse notified, See doc flow    Activity Tolerance:   Fair+  Please refer to the flowsheet for vital signs taken during this treatment. After treatment:   [] Patient left in no apparent distress sitting up in chair  [x] Patient left in no apparent distress in bed  [x] Call bell left within reach  [x] Nursing notified  [] Caregiver present  [] Bed alarm activated  [] SCDs applied      COMMUNICATION/EDUCATION:   [x]         Role of Physical Therapy in the acute care setting.   [x]         Fall prevention education was provided and the patient/caregiver indicated understanding. [x]         Patient/family have participated as able in working toward goals and plan of care. [x]         Patient/family agree to work toward stated goals and plan of care. []         Patient understands intent and goals of therapy, but is neutral about his/her participation.   []         Patient is unable to participate in stated goals/plan of care: ongoing with therapy staff.  []         Other:        Marilyn Pollard PTA   Time Calculation: 28 mins

## 2021-09-30 NOTE — DISCHARGE INSTRUCTIONS
[address] [facility]     Patient Discharge Instructions    Kingsley Mayer / [id number] : 1945    Admitted 2021 Discharged: 2021     IF YOU HAVE ANY PROBLEMS ONCE YOU ARE AT HOME CALL THE FOLLOWING NUMBERS:   Main office number: [phone]    Your follow up appointment to see either Dr. Mannie Lara is scheduled in 1-2 weeks. If you are unsure of your appointment date call the office at [phone]. Take Home Medications     · Resume your home medictions as directed  · A prescription for pain medication has been given   · It is important that you take the medication exactly as they are prescribed. · Keep your medication in the bottles provided by the pharmacist and keep a list of the medication names, dosages, and times to be taken in your wallet. · Do not take other medications without consulting your doctor. · Note:  If you have already received and/or filled a prescription for one or more of the medications you've received a prescription for when leaving the hospital, you may disguard the duplicate prescription. What to do at [address] [place] [address] Ave your prehospital diet. If you have excessive nausea or vomitting call your doctor's office     Wear portable sequential compressive devices at least 18 hours per day for 2 weeks. They may be used beyond 2 weeks as needed to help control swelling. PATRICIA hose should be worn during the day - may be removed for sleep. Should be worn on both legs for 2 weeks and then on the operative extremity for a total of 6 weeks. Begin In-Home Physical Therapy; 3 times a week to work on gait training, range of motion, strengthening, and weight bearing exercises as tolerable. Continue to use your walker or cane when walking. May progress from the walker to a cane to complete total bearing as tolerable. Keep incision DRY. You may need to sponge bathe to keep the incision dry.     When to Call    - Call if you have a temperature greater then 101  - Unable to keep food down  - Are unable to bear any wieght   - Need a pain medication refill     Information obtained by :  I understand that if any problems occur once I am at home I am to contact my physician. I understand and acknowledge receipt of the instructions indicated above.                                                                                                                                            Physician's or R.N.'s Signature                                                                  Date/Time                                                                                                                                              Patient or Representative Signature                                                          Date/Time

## 2021-09-30 NOTE — OP NOTES
[address]  [address] [place] [address], [address] [place], [place] [address]anshul Nunez, [address] LEFT TOTAL KNEE ARTHROPLASTY    Patient: Shane Taveras MRN: [id number]  SSN: [id number]    YOB: 1945  Age: 77 y.o. Sex: female      Date of Surgery: 9/29/2021   Preoperative Diagnosis: LOOSENING LEFT KNEE REPLACEMENT   Postoperative Diagnosis: LOOSENING LEFT KNEE REPLACEMENT   Location: [facility]  Surgeon: Jesenia Salas MD  Physician Assistant: RANDI Reeves was medically necessary for holding of retractors for exposure and to complete the case. Assistant: Circ-1Nmainor Das RN  Circ-Relief: Keyur Castro RN  Physician Assistant: RANDI Black-SANJANA  Scrub Tech-1: Desirae Logan  Scrub RN-1: Afshan Loomis RN  Surg Asst-1: Sapphire Blanco    Anesthesia: General + Low femoral Nerve Block    Procedure: Left Revision Total Knee Arthroplasty    Findings:  left total knee arthroplasty with polythylene wear, osteolysis; grossly loose tibial component with lateral subsidence; stable femoral and patellar components    Estimated Blood Loss: 200 mL    Fluids: see anesthesia record    Specimens: Synovium, synovial fluid    Cultures: Synovial fluid, synovium    Complications: None    Tourniquet Time:   Total Tourniquet Time Documented:  Thigh (Left) - 93 minutes  Total: Thigh (Left) - 93 minutes    Tourniquet Pressure: 250 mm Hg    Tranexamic Acid: 1000 mg/10 mL + 15 mL NS given IV  x2 doses    Exparel solution: 20 mL (13 mg/mL) + 40 mL NS    Implants:   Implant Name Type Inv.  Item Serial No.  Lot No. LRB No. Used Action   CEMENT BNE 40GM FULL DOSE PMMA W/ GENT HI VISC RADPQ LNG - [id number]  CEMENT BNE 40GM FULL DOSE PMMA W/ GENT HI VISC RADPQ LNG  [employer]_ [id number] Left 2 Implanted   COMPONENT FEM SZ 4 L KNEE CO CHROM MOLYBDENUM MAIKEL W/ ASYM - [id number]  COMPONENT FEM SZ 4 L KNEE CO CHROM MOLYBDENUM MAIKEL W/ ASYM  [employer] [id number] Left 1 Implanted   BASEPLATE TIB SZ 3 KNEE CO CHROM MOLYBDENUM TI ALLY ROT - [id number]  BASEPLATE TIB SZ 3 KNEE CO CHROM MOLYBDENUM TI ALLY ROT  [facility] [employer] [id number] Left 1 Implanted   SLEEVE TIB 29MM KNEE PORCOAT PARTIALLY REV SYS ATTUNE - [id number]  SLEEVE TIB 29MM KNEE PORCOAT PARTIALLY REV SYS ATTUNE  [facility] [employer] [id number] Left 1 Implanted   AUGMENT FEM SZ 4 THK4MM DST KNEE CO CHROM MOLYBDENUM MAIKEL - [id number]  AUGMENT FEM SZ 4 THK4MM DST KNEE CO CHROM MOLYBDENUM MAIKEL  [facility] [employer] [id number] Left 2 Implanted   AUGMENT FEM SZ 4 THK4MM POST KNEE CO CHROM MOLYBDENUM MAIKEL - [id number]  AUGMENT FEM SZ 4 THK4MM POST KNEE CO CHROM MOLYBDENUM MAIKEL  [facility] [employer] [id number] Left 1 Implanted   COMPONENT FEM AUG MAIKEL 4 8 MM POST KNEE REV COCR MOLYBDENUM - [id number]  COMPONENT FEM AUG MAIKEL 4 8 MM POST KNEE REV COCR MOLYBDENUM  [facility] [employer] [id number] Left 1 Implanted   STEM FEM L50MM [id number] KNEE MAIKEL REV ATTUNE - [id number]  STEM FEM L50MM [id number] KNEE MAIKEL REV ATTUNE  [facility] [employer] [id number] Left 1 Implanted   COMPONENT PAT [id number] KNEE POLY MAIKEL MEDIALIZED GABY ATTUNE - [id number]  COMPONENT PAT [id number] KNEE POLY MAIKEL MEDIALIZED GABY ATTUNE  [facility] [employer] [id number] Left 1 Implanted   STEM FEM L50MM [id number] KNEE MAIKEL REV ATTUNE - [id number]  STEM FEM L50MM [id number] KNEE MAIKEL REV ATTUNE  [facility] [employer] [id number] Left 1 Implanted   INSERT TIB SZ 4 [id number] ROT PLATFRM KNEE UHMWPE ANTIOXIDANT - [id number]  INSERT TIB SZ 4 [id number] ROT PLATFRM KNEE UHMWPE ANTIOXIDANT  [facility] [employer] [id number] Left 1 Implanted        Patient Condition: Stable.    ---------  INDICATIONS:   This 77y.o.-year-old female has had pain in the left knee for years and has become functionally disabled with respect to the activities of daily living.   The pain is increased with weight-bearing. The pain and dysfunction were not releaved by conservative therapy analgesics. The radiographs showed cemented total knee arthroplasty with gross subsidence of tibial component. A left revision total knee replacement has been recommended. The risks and the possible complications of this surgery and anesthesia including, but not exclusive to, bleeding, infection, dislocation, fracture, blood clots, nerve and vascular injury, possible need for other procedures, and possibly death have been explained to the patient. The patient accepts these risks for the benefits of joint replacement over the failure of non-operative care to provide adequate pain relief and improve their functional disability. The patients medical problems have been addressed by their primary care physician and are deemed stable and as well controlled as possible. Inpatient hospital care was medically necessary, reasonable, and appropriate. This is a medically necessary procedure. As such, without use of a surgical assistant to retract soft tissues, protect vital neuro-vascular structures and assist in the technical aspects of the operation, this procedure would not have been possible. Therefore this assistant was medically necessary. DESCRIPTION OF PROCEDURE:    After the risks and benefits of surgery were discussed, informed consent was obtained. The patient was identified in the preoperative holding area and the operative extremity was marked. Preoperatively a low femoral nerve block was performed by anesthesia. The patient was taken to the operating room and placed in the supine position. General anesthesia was performed. IV antibiotics were given. No Emerson catheter was placed.   After verification of the appropriate operative site from the consent form, with confirmation of surgeon, anesthesia and nursing teams, a tourniquet was placed and the left leg was prepped and draped in sterile fashion using Chloraprep. A formal timeout was performed. The tourniquet was inflated and a midline incision was made in line with the previous incision over the anterior knee medial to the tibial tubercle and the dissection was taken down to Jeannette's fascia. A trivector arthrotomy was performed, fluid was sent for culture. Medial release was made and the infrapatellar fat pad was trimmed. An extensive synovectomy was performed - with some of the synovium sent for pathology and some for tissue culture. Inspection of the knee revealed the femoral component was well fixed; the patellar component was well fixed and the tibial component was grossly losse. The polyethylene liner had significant wear. The tibia was subluxated anteriorly and the polyethylene liner was removed. Using flexible osteotomes the cement bone interface of the femoral component was undermined and the femoral component was removed. Next using osteotomes again the tibial component was removed. At this point there was noted to be moderate bone loss on the femoral side - notably along the posterior condyle - worst on the lateral side. On the tibial side there was moderate bone loss of the medullary aspect of the tibia with insufficient lateral cortical rim and lateral osteolytic lesions. The patella was well fixed. The patellar prosthesis was removed with a saw. There were osteolytic lesions underlying the patellar component. The tibia was reamed up to 14 mm. Using intramedullary guide the tibia cut was refreshed. Next broaching was performed. The femur was reamed up to 14 mm. Using intramedullary femoral guide, the femur was prepared including 4 mm distal augments medially and laterally and 4 mm posteromedial augment and 8 mm posterolateral augment. The patella was prepped for a 35 mm implant. Trials were placed and soft tissue releases performed as needed. The trials were removed.   20 cc of Exparel solution and 10 cc of 0.25% Marcaine were injected into the posterior soft tissues. Care was taken to aspirate prior to injecting to prevent intravascular anesthetic injection. The cement was prepared and the implants cemented into place - care was taken to avoid getting cement on the ingrowth surface of the tibial component. During cementation, the 16 mm trial liner was used. The remaining 40 cc of Exparel solution and an additional 20 cc of 0.25% Marcaine were injected into the deep and superficial soft tissues around the knee as well as the periosteum. The knee was copiously irrigated. The real polyethylene liner was placed. The tourniquet was released and hemostasis was achieved. A subfascial Hemovac drain was placed and a standard layered closure was performed using #1 Knotless Stratafix for the fascia, 0 Vicryl for the subcutaneous and 3-0 Vicryl for the subcuticular layer followed by skin closure with staples. Sterile dressings were placed and a knee immobilizer was applied. The patient was awakened and there were no complications. Final sponge and needle counts were correct x2.     Felix Kendall MD

## 2021-09-30 NOTE — PROGRESS NOTES
Problem: Mobility Impaired (Adult and Pediatric)  Goal: *Acute Goals and Plan of Care (Insert Text)  Description: PT goals to be met in 1 day:  Pt will be able to perform supine<>sit SBA for transfers at home. Pt will be able to perform sit<>stand SBA for increased ability to transfer at home safely. Pt will be able to participate in gt training >50' w/ RW, WBAT, L KI, GB and CGA for improved ability in home upon d/c. Pt will be able to perform stair training step to pattern, B/U rail and CGA to obtain safe entry into home upon d/c. Pt will be educated regarding HEP per MD protocol for optimal AROM/strength outcomes. Outcome: Progressing Towards Goal   []  Patient has met MD mobilization critieria for d/c home   []  Recommend HH with 24 hour adult care   [x]  Benefit from additional acute PT session to address:  increase ambulation, stair nego    PHYSICAL THERAPY TREATMENT    Patient: Jackie Owens (55 y.o. female)  Date: 9/30/2021  Diagnosis: Status post revision of total knee replacement, left [Z96.652] <principal problem not specified>  Procedure(s) (LRB):  REVISION LEFT TOTAL KNEE ARTHROPLASTY, AND ALL INDICATED PROCEDURES (Left) 1 Day Post-Op  Precautions: Fall, WBAT  PLOF:     ASSESSMENT:  Pt call light on upon arrival, requesting to use [facility] and needs pain medicine. Reports a 10/10 p! .  Pt with KI donned. Increased time needed to sit up EOB. Elevated bed to assist with sit to stand. Pt avoiding wt bearing on the LLE when taking steps over to [facility]. Pt voided 200cc, independent with tristan-care in standing. Transferred back to bed. Pt utilized a towel to scoot LLE up and into bed. Notified nurse about pain meds. Will follow up again for PT.   Progression toward goals:   []      Improving appropriately and progressing toward goals  [x]      Improving slowly and progressing toward goals  []      Not making progress toward goals and plan of care will be adjusted     PLAN:  Patient continues to benefit from skilled intervention to address the above impairments. Continue treatment per established plan of care. Discharge Recommendations:  Home Health  Further Equipment Recommendations for Discharge:  rolling walker     SUBJECTIVE:   Patient stated I need to use the bathroom and pain medicine.     OBJECTIVE DATA SUMMARY:   Critical Behavior:  Neurologic State: Alert, Appropriate for age  Orientation Level: Appropriate for age, Oriented X4  Cognition: Appropriate decision making, Appropriate for age attention/concentration, Appropriate safety awareness  Safety/Judgement: Awareness of environment  Functional Mobility Training:  Bed Mobility:    Supine to Sit: Supervision; Additional time  Sit to Supine: Modified independent; Additional time  Scooting: Additional time  Transfers:  Sit to Stand: Contact guard assistance;Stand-by assistance  Stand to Sit: Stand-by assistance  Balance:  Sitting: Intact  Standing: Impaired; With support  Standing - Static: Fair  Standing - Dynamic : Fair   Ambulation/Gait Training:  Distance (ft): 3 Feet (ft)  Assistive Device: Gait belt;Walker, rolling;Brace/Splint  Left Side Weight Bearing: As tolerated  Step Length: Right shortened;Left shortened        Pain:  Pain level pre-treatment: 10/10  Pain level post-treatment: 10/10   Pain Intervention(s): Medication (see MAR); Rest, Ice, Repositioning   Response to intervention: Nurse notified, See doc flow    Activity Tolerance:   Fair-  Please refer to the flowsheet for vital signs taken during this treatment. After treatment:   [] Patient left in no apparent distress sitting up in chair  [x] Patient left in no apparent distress in bed  [x] Call bell left within reach  [x] Nursing notified  [] Caregiver present  [] Bed alarm activated  [] SCDs applied      COMMUNICATION/EDUCATION:   [x]         Role of Physical Therapy in the acute care setting.   [x]         Fall prevention education was provided and the patient/caregiver indicated understanding. [x]         Patient/family have participated as able in working toward goals and plan of care. [x]         Patient/family agree to work toward stated goals and plan of care. []         Patient understands intent and goals of therapy, but is neutral about his/her participation.   []         Patient is unable to participate in stated goals/plan of care: ongoing with therapy staff.  []         Other:        Deann Rojo PTA   Time Calculation: 12 mins

## 2021-09-30 NOTE — DISCHARGE SUMMARY
[address] [address]     DISCHARGE SUMMARY     PATIENT: Nelly Zarate     MRN: [id number]   ADMIT DATE: 2021   [place]   DISCHARGE DATE:  21     ATTENDING: Guillaume Larsen MD   DICTATING: Ansley Huang PA-C     ADMISSION DIAGNOSIS: Status post revision of total knee replacement, left [Z96.652]    DISCHARGE DIAGNOSIS: Status post LOOSENING LEFT KNEE REPLACEMENT    HISTORY OF PRESENT ILLNESS: The patient is a 77y.o. year-old female   with ongoing left knee pain secondary to osteoarthritis of her left knee. The patient's pain has persisted and progressed despite conservative treatments and therapies. The patient has at this time opted for surgical intervention. PAST MEDICAL HISTORY:   Past Medical History:   Diagnosis Date    Anxiety and depression     anxiety, depression    Asthma     Chronic obstructive pulmonary disease ([address]     Coagulation disorder (HCC)     alpha thalassemia     GERD (gastroesophageal reflux disease)     Hypertension     - no meds    Ill-defined condition     seasonal allergies    RA (rheumatoid arthritis) ([address]        PAST SURGICAL HISTORY:   Past Surgical History:   Procedure Laterality Date    COLONOSCOPY N/A 2021    COLONOSCOPY; POLYPECTOMY performed by Lou Ya[facility]ez MD at [facility] ENDOSCOPY    HX APPENDECTOMY      HX CATARACT REMOVAL Bilateral     HX  SECTION      x 2    HX CHOLECYSTECTOMY      HX HERNIA REPAIR      ventral    HX MOHS PROCEDURES Right     HX ORTHOPAEDIC Bilateral     knee replacement    HX SHOULDER ARTHROSCOPY Right 2006    rotator cuff repair.      HX TONSILLECTOMY         ALLERGIES:   Allergies   Allergen Reactions    Latex Anaphylaxis    Aspirin Anaphylaxis     Patient has tolerated with NSAIDS    Sulfa (Sulfonamide Antibiotics) Anaphylaxis    Egg Swelling     Eye swelling    Iodinated Contrast Media Unknown (comments) ** able to use topical betadine w/out issues.  Pcn [Penicillins] Swelling        CURRENT MEDICATIONS:  A list of medications prior to the time of admission include:  Prior to Admission medications    Medication Sig Start Date End Date Taking? Authorizing Provider   apixaban (Eliquis) 2.5 mg tablet Take 1 Tablet by mouth two (2) times a day. 9/30/21  Yes Rodriguez Shultz PA-C   docusate sodium (COLACE) 50 mg capsule Take 2 Capsules by mouth three (3) times daily as needed for Constipation for up to 90 days. 9/30/21 12/29/21 Yes Rodriguez Shultz PA-C   oxyCODONE-acetaminophen (PERCOCET) 5-325 mg per tablet Take 1-2 Tablets by mouth every four (4) hours as needed for Pain for up to 30 days. Max Daily Amount: 12 Tablets. 9/30/21 10/30/21 Yes Rodriguez Shultz PA-C   fluticasone propionate (Flonase Allergy Relief) 50 mcg/actuation nasal spray 2 Sprays by Both Nostrils route daily. Yes Provider, Historical   ferrous sulfate 324 mg (65 mg iron) tablet Take  by mouth two (2) times daily (with meals). Yes Provider, Historical   FOLIC ACID PO Take  by mouth. Yes Provider, Historical   cyclobenzaprine (FLEXERIL) 5 mg tablet Take 5 mg by mouth. Yes Provider, Historical   DULoxetine (Cymbalta) 30 mg capsule Take 30 mg by mouth daily. Yes Provider, Historical   methotrexate (RHEUMATREX) 2.5 mg tablet Take 2.5 mg by mouth Every Thursday. Indications: rheumatoid arthritis   Yes Provider, Historical   albuterol (PROAIR HFA) 90 mcg/actuation inhaler Take 2 Puffs by inhalation two (2) times a day. Indications: CHRONIC OBSTRUCTIVE PULMONARY DISEASE 6/23/15  Yes Jimmy Ortiz MD   ciprofloxacin HCl (CIPRO) 500 mg tablet Take  by mouth two (2) times a day. Patient not taking: Reported on 9/29/2021    Provider, Historical       FAMILY HISTORY: History reviewed. No pertinent family history.     SOCIAL HISTORY:   Social History     Socioeconomic History    Marital status:      Spouse name: Not on file  Number of children: Not on file    Years of education: Not on file    Highest education level: Not on file   Tobacco Use    Smoking status: Never Smoker    Smokeless tobacco: Never Used   Vaping Use    Vaping Use: Never used   Substance and Sexual Activity    Alcohol use: No    Drug use: No     Social Determinants of Health     Financial Resource Strain:     Difficulty of Paying Living Expenses:    Food Insecurity:     Worried About Running Out of Food in the Last Year:     [address] in the Last Year:    Transportation Needs:     Lack of Transportation (Medical):  Lack of Transportation (Non-Medical):    Physical Activity:     Days of Exercise per Week:     Minutes of Exercise per Session:    Stress:     Feeling of Stress :    Social Connections:     Frequency of Communication with Friends and Family:     Frequency of Social Gatherings with Friends and Family:     Attends Hoahaoism Services:     Active Member of Clubs or Organizations:     Attends Club or Organization Meetings:     Marital Status:        REVIEW OF SYSTEMS: All review of systems are negative. PHYSICAL EXAMINATION: For a detailed physical exam, please refer to the patient's chart. HOSPITAL COURSE: The patient was taken to surgery the day of admission. she underwent a left total REVISION knee replacement. Operative course was benign. Estimated blood loss was approximately 200 cc. The patient was taken to the PACU in stable condition and was later taken to the floor in stable condition. During her hospital stay, the patient progressed well with physical therapy and occupational therapy, adherent to instructions. she had been cleared by physical therapy with stair training. she was placed on eliquis for DVT prophylaxis. her pain has been well controlled with oral pain medications. her vitals have remained stable. she has also remained hemodynamically stable.  The patient has been recommended for discharge home on POD#1. DISCHARGE INSTRUCTIONS: The patient is to be discharged home with home health. she is to continue on her prior medications per the medication reconciliation form, to which we will add:    1. Eliquis 2.5 mg; 1 tablet by mouth twice daily for 10 days  2. Colace 100 mg po TID prn constipation  3. Percocet 5/325 mg; 1-2 tablets p.o. every 4 to 6 hours p.r.n. for pain    Immobilizer with ambulation    Light ROM only 0-90 when seated      The patient is to continue at home with home physical therapy 3 times a week to work on gait training, range of motion (0-90), strengthening, and weightbearing exercises as tolerated (with immobilizer) on her left lower extremity. The patient is to progress from a walker to a cane to complete total weightbearing as tolerable. The patient is to continue to keep her incision dry. The patient is to followup with Dr. Cynthia Fernandez in the office approximately 1-2 weeks status post for x-rays and further evaluation.     Sherly Lynn PA-C  7/60/67470:12 AM

## 2021-09-30 NOTE — PROGRESS NOTES
Problem: Falls - Risk of  Goal: *Absence of Falls  Description: Document Raymon Arguelles Fall Risk and appropriate interventions in the flowsheet.   Outcome: Progressing Towards Goal  Note: Fall Risk Interventions:  Mobility Interventions: Communicate number of staff needed for ambulation/transfer, Patient to call before getting OOB, Utilize walker, cane, or other assistive device         Medication Interventions: Patient to call before getting OOB, Teach patient to arise slowly    Elimination Interventions: Call light in reach, Patient to call for help with toileting needs    History of Falls Interventions: Consult care management for discharge planning

## 2021-09-30 NOTE — PROGRESS NOTES
Occupational Therapy Treatment Attempt     Chart reviewed. Attempted Occupational Therapy Treatment, however, patient unable to be seen due to:  []  Nausea/vomiting  []  Eating  [x]  Pain: 10/10 at L knee. Requests to return later   []  Patient too lethargic  []  Off Unit for testing/procedure  []  Dialysis treatment in progress  []  Telemetry Results  []  Other:      Will f/u later as patient's schedule allows.   Mic Linton, OTR/L

## 2021-09-30 NOTE — PROGRESS NOTES
1915 - Bedside report received from Eliu, [facility]. Patient in bed. Pain 0/10.     2008 - Patient in bed at this time. IV to LA  intact and patent. Sequential compression device bilaterally. TEDs to RLE. Dressing to L knee CDI. + CMS. Immobilizer on. Pt A & O x 4. LS clear, on 3 LNC. Abdomen soft, NT and ND. + BS to all 4 quadrants. Denies nausea. Pain 0/10. Call light within reach. 0600-Pt unable to get up, says she is in a lot of pain. Bedside and Verbal shift change report given to ROSSY Nowak by Merlinda Goring. Report included the following information SBAR, Kardex, OR Summary, Intake/Output and MAR.

## 2021-09-30 NOTE — PROGRESS NOTES
1538-Called daughter twice and left voicemail, pt asked if can try calling again, pt has cleared PT.    1750-This writer has reviewed discharge instructions with patient at this time. Patient has verbalized understanding. Patient was provided with care notes to include side effects of RX's. Arm bands removed and shredded. AVS reviewed with Rea Chapa .

## 2021-09-30 NOTE — NURSE NAVIGATOR
Omar Gonzalez Rounded on post total knee revision. Patient educated: Activity:   OOB for all meals,   Walk short distances every hour during the day and evening to promote circulation, help move better and lessen stiffness. Also helps to get the muscles stretched and strong. When elevating leg and sitting do not put anything under knee. IT is important to work on getting the leg stretched out and as straight as possible. Promoting circulation  Ankle pumps 10 times an hour at hospital & home. Take medications as prescribed by provider. Pain Control:  Pain medications side effects of constipation, nausea, dizziness, itching reviewed. Reminded patient swelling, bruising and increased pain are normal at home. To help decrease swelling after surgery it is safe to lie down and elevate legs above heart to help decrease swelling. Use pillows while keeping the surgical knee straight when elevating. Use ice, distraction, moving, & change position to help with pain. Rest between activity. Educated that medications can cause nausea and decreased appetite so eat a snack before taking medication. Narcotics cause constipation so take stool softener/mild laxative daily while on narcotics. Incentive Spirometry:    Use of incentive spirometer 10 x/hr. Diet:   Eat for healing. Drink plenty of fluids so urine is lemonade in color. Patient Safety:   Call light & belongings in reach. Call for help when want to walk or get OOB. Omar Gonzalez verbalized understand. Given the opportunity for asking questions.       Orthopaedic Navigator

## 2021-09-30 NOTE — PROGRESS NOTES
Transition of Care (NANCY) Plan:          Pt admitted for an elective Knee  surgical procedure. cm met with pt at bedside to discuss d/c planning  Please encourage ambulation. No transition of care needs identified at this time. Anticipate pt will be medically stable for discharge within the next 24-48 hours with physician follow up. CM available to assist as needed. NANCY Transportation:   How is patient being transported at discharge? Family/Friend      When? Once cleared by physician     Is transport scheduled? N/A      Follow-up appointment and transportation:   PCP/Specialist?  See AVS for Appointment         Who is transporting to the follow-up appointment? Self/Family/Friend      Is transport for follow up appointment scheduled? N/A    Communication plan (with patient/family): Who is being called? Patient or Next of Kin? Responsible party? Patient      What number(s) is to be used? See Facesheet      What service provider is calling for [facility] services? When are they calling? Readmission Risk?   (Green/Low; Yellow/Moderate; Red/High):  [employer] here to complete [address] including selection of the Healthcare Decision Maker Relationship (ie \"Primary\")

## 2021-10-14 LAB
BACTERIA SPEC CULT: NORMAL
GRAM STN SPEC: NORMAL
SERVICE CMNT-IMP: NORMAL

## 2021-11-01 LAB
BACTERIA SPEC CULT: NORMAL
SERVICE CMNT-IMP: NORMAL

## 2022-03-18 PROBLEM — Z96.652 STATUS POST REVISION OF TOTAL KNEE REPLACEMENT, LEFT: Status: ACTIVE | Noted: 2021-09-29

## 2023-01-23 ENCOUNTER — HOSPITAL ENCOUNTER (EMERGENCY)
Age: 78
Discharge: HOME | End: 2023-01-23
Attending: EMERGENCY MEDICINE
Payer: MEDICARE

## 2023-01-23 ENCOUNTER — HOSPITAL ENCOUNTER (EMERGENCY)
Dept: VASCULAR SURGERY | Age: 78
Discharge: HOME | End: 2023-01-23
Attending: EMERGENCY MEDICINE
Payer: MEDICARE

## 2023-01-23 VITALS
BODY MASS INDEX: 19.99 KG/M2 | SYSTOLIC BLOOD PRESSURE: 142 MMHG | RESPIRATION RATE: 18 BRPM | OXYGEN SATURATION: 98 % | DIASTOLIC BLOOD PRESSURE: 82 MMHG | TEMPERATURE: 97.6 F | HEIGHT: 65 IN | HEART RATE: 62 BPM | WEIGHT: 120 LBS

## 2023-01-23 DIAGNOSIS — M71.21 SYNOVIAL CYST OF RIGHT POPLITEAL SPACE: ICD-10-CM

## 2023-01-23 DIAGNOSIS — T14.8XXA HEMATOMA: Primary | ICD-10-CM

## 2023-01-23 LAB
ANION GAP SERPL CALC-SCNC: 4 MMOL/L (ref 3–18)
APTT PPP: 34.2 SEC (ref 23–36.4)
BASOPHILS # BLD: 0 K/UL (ref 0–0.1)
BASOPHILS NFR BLD: 1 % (ref 0–2)
BUN SERPL-MCNC: 19 MG/DL (ref 7–18)
BUN/CREAT SERPL: 27 (ref 12–20)
CALCIUM SERPL-MCNC: 9.5 MG/DL (ref 8.5–10.1)
CHLORIDE SERPL-SCNC: 110 MMOL/L (ref 100–111)
CO2 SERPL-SCNC: 24 MMOL/L (ref 21–32)
CREAT SERPL-MCNC: 0.71 MG/DL (ref 0.6–1.3)
DIFFERENTIAL METHOD BLD: ABNORMAL
EOSINOPHIL # BLD: 0.2 K/UL (ref 0–0.4)
EOSINOPHIL NFR BLD: 2 % (ref 0–5)
ERYTHROCYTE [DISTWIDTH] IN BLOOD BY AUTOMATED COUNT: 14.6 % (ref 11.6–14.5)
GLUCOSE SERPL-MCNC: 88 MG/DL (ref 74–99)
HCT VFR BLD AUTO: 36.5 % (ref 35–45)
HGB BLD-MCNC: 11.8 G/DL (ref 12–16)
IMM GRANULOCYTES # BLD AUTO: 0 K/UL (ref 0–0.04)
IMM GRANULOCYTES NFR BLD AUTO: 0 % (ref 0–0.5)
INR PPP: 1 (ref 0.8–1.2)
LYMPHOCYTES # BLD: 2.2 K/UL (ref 0.9–3.6)
LYMPHOCYTES NFR BLD: 25 % (ref 21–52)
MCH RBC QN AUTO: 29 PG (ref 24–34)
MCHC RBC AUTO-ENTMCNC: 32.3 G/DL (ref 31–37)
MCV RBC AUTO: 89.7 FL (ref 78–100)
MONOCYTES # BLD: 0.8 K/UL (ref 0.05–1.2)
MONOCYTES NFR BLD: 9 % (ref 3–10)
NEUTS SEG # BLD: 5.3 K/UL (ref 1.8–8)
NEUTS SEG NFR BLD: 62 % (ref 40–73)
NRBC # BLD: 0 K/UL (ref 0–0.01)
NRBC BLD-RTO: 0 PER 100 WBC
PLATELET # BLD AUTO: 256 K/UL (ref 135–420)
PMV BLD AUTO: 10.6 FL (ref 9.2–11.8)
POTASSIUM SERPL-SCNC: 4.2 MMOL/L (ref 3.5–5.5)
PROTHROMBIN TIME: 13.6 SEC (ref 11.5–15.2)
RBC # BLD AUTO: 4.07 M/UL (ref 4.2–5.3)
SODIUM SERPL-SCNC: 138 MMOL/L (ref 136–145)
WBC # BLD AUTO: 8.6 K/UL (ref 4.6–13.2)

## 2023-01-23 PROCEDURE — 85610 PROTHROMBIN TIME: CPT

## 2023-01-23 PROCEDURE — 85025 COMPLETE CBC W/AUTO DIFF WBC: CPT

## 2023-01-23 PROCEDURE — 99284 EMERGENCY DEPT VISIT MOD MDM: CPT

## 2023-01-23 PROCEDURE — 80048 BASIC METABOLIC PNL TOTAL CA: CPT

## 2023-01-23 PROCEDURE — 93971 EXTREMITY STUDY: CPT

## 2023-01-23 PROCEDURE — 85730 THROMBOPLASTIN TIME PARTIAL: CPT

## 2023-01-23 NOTE — ED PROVIDER NOTES
[facility] EMERGENCY DEPT  EMERGENCY DEPARTMENT ENCOUNTER       Pt Name: Jackie Owens  MRN: [id number]  [place] 1945  Date of evaluation: 1/23/2023  Provider: RANDI Johnson   PCP: Kellee Yang MD  Note Started: 6:30 PM 1/23/23     CHIEF COMPLAINT       Chief Complaint   Patient presents with    Leg Pain        HISTORY OF PRESENT ILLNESS: 1 or more elements      History From: Patient  HPI Limitations : None     Jackie Owens is a 69 y.o. female who presents right lower leg pain and bruising that she noticed 2 days ago. Denies any black tarry stool blood thinner use epistaxis or bleeding gums. She did not have any injury that she can recall. No history of blood clots in the past.  No tingling or numbness. States that hurts more when she is standing on it. She was seen by her rheumatoid doctor and was told to come to the ED to rule out blood clot     Nursing Notes were all reviewed and agreed with or any disagreements were addressed in the HPI. REVIEW OF SYSTEMS      Review of Systems   HENT:  Negative for nosebleeds. Respiratory:  Negative for cough and shortness of breath. Cardiovascular:  Negative for chest pain. Gastrointestinal:  Negative for abdominal pain, blood in stool, diarrhea, nausea and vomiting. Musculoskeletal:  Negative for back pain and neck pain. Skin:  Positive for color change and wound. Neurological:  Negative for weakness and numbness. All other systems reviewed and are negative. Positives and Pertinent negatives as per HPI.     PAST HISTORY     Past Medical History:  Past Medical History:   Diagnosis Date    Anxiety and depression     anxiety, depression    Asthma     Chronic obstructive pulmonary disease (HCC)     Coagulation disorder (HCC)     alpha thalassemia     GERD (gastroesophageal reflux disease)     Hypertension     4-2015- no meds    Ill-defined condition     seasonal allergies    RA (rheumatoid arthritis) ([facility] [address]        Past Surgical History:  Past Surgical History:   Procedure Laterality Date    COLONOSCOPY N/A 2021    COLONOSCOPY; POLYPECTOMY performed by Ronny Peck MD at [facility] ENDOSCOPY    HX APPENDECTOMY      HX CATARACT REMOVAL Bilateral     HX  SECTION      x 2    HX CHOLECYSTECTOMY      HX HERNIA REPAIR      ventral    HX MOHS PROCEDURES Right     HX ORTHOPAEDIC Bilateral     knee replacement    HX SHOULDER ARTHROSCOPY Right 2006    rotator cuff repair. HX TONSILLECTOMY         Family History:  History reviewed. No pertinent family history. Social History:  Social History     Tobacco Use    Smoking status: Never    Smokeless tobacco: Never   Vaping Use    Vaping Use: Never used   Substance Use Topics    Alcohol use: No    Drug use: No       Allergies: Allergies   Allergen Reactions    Latex Anaphylaxis    Aspirin Anaphylaxis     Patient has tolerated with NSAIDS    Sulfa (Sulfonamide Antibiotics) Anaphylaxis    Egg Swelling     Eye swelling    Iodinated Contrast Media Unknown (comments)     ** able to use topical betadine w/out issues. Pcn [Penicillins] Swelling       CURRENT MEDICATIONS      Previous Medications    ALBUTEROL (PROAIR HFA) 90 MCG/ACTUATION INHALER    Take 2 Puffs by inhalation two (2) times a day. Indications: CHRONIC OBSTRUCTIVE PULMONARY DISEASE    APIXABAN (ELIQUIS) 2.5 MG TABLET    Take 1 Tablet by mouth two (2) times a day. CIPROFLOXACIN HCL (CIPRO) 500 MG TABLET    Take  by mouth two (2) times a day. CYCLOBENZAPRINE (FLEXERIL) 5 MG TABLET    Take 5 mg by mouth. DULOXETINE (CYMBALTA) 30 MG CAPSULE    Take 30 mg by mouth daily. FERROUS SULFATE 324 MG (65 MG IRON) TABLET    Take  by mouth two (2) times daily (with meals). FLUTICASONE PROPIONATE (FLONASE ALLERGY RELIEF) 50 MCG/ACTUATION NASAL SPRAY    2 Sprays by Both Nostrils route daily. FOLIC ACID PO    Take  by mouth.        SCREENINGS               No data recorded         PHYSICAL EXAM      ED Triage Vitals [23 1524]   ED Encounter Vitals Group      /67      Pulse (Heart Rate) 85      Resp Rate 16      Temp 97.6 °F (36.4 °C)      Temp src       O2 Sat (%) 98 %      Weight 120 lb      Height 5' 5\"        Physical Exam  Vitals and nursing note reviewed. Constitutional:       Appearance: She is well-developed. Comments: nontoxic no acute distress   HENT:      Head: Normocephalic and atraumatic. Cardiovascular:      Rate and Rhythm: Normal rate and regular rhythm. Heart sounds: Normal heart sounds. No murmur heard. Pulmonary:      Effort: Pulmonary effort is normal. No respiratory distress. Breath sounds: Normal breath sounds. No wheezing or rales. Abdominal:      General: Bowel sounds are normal.      Palpations: Abdomen is soft. Tenderness: There is no abdominal tenderness. Musculoskeletal:      Cervical back: Normal range of motion and neck supple. Comments: Bruising to the medial and posterior aspect of the right calf, no abrasions or lacerations, no generalized swelling  Compartments are soft  Pulses 2+ for DP and PT   Skin:     Findings: Bruising present. Neurological:      Mental Status: She is alert and oriented to person, place, and time. Psychiatric:         Judgment: Judgment normal.        DIAGNOSTIC RESULTS   LABS:     Recent Results (from the past 12 hour(s))   CBC WITH AUTOMATED DIFF    Collection Time: 01/23/23  7:29 PM   Result Value Ref Range    WBC 8.6 4.6 - 13.2 K/uL    RBC 4.07 (L) 4.20 - 5.30 M/uL    HGB 11.8 (L) 12.0 - 16.0 g/dL    HCT 36.5 35.0 - 45.0 %    MCV 89.7 78.0 - 100.0 FL    MCH 29.0 24.0 - 34.0 PG    MCHC 32.3 31.0 - 37.0 g/dL    RDW 14.6 (H) 11.6 - 14.5 %    PLATELET 787 903 - 044 K/uL    MPV 10.6 9.2 - 11.8 FL    NRBC 0.0 0  WBC    ABSOLUTE NRBC 0.00 0.00 - 0.01 K/uL    NEUTROPHILS 62 40 - 73 %    LYMPHOCYTES 25 21 - 52 %    MONOCYTES 9 3 - 10 %    EOSINOPHILS 2 0 - 5 %    BASOPHILS 1 0 - 2 %    IMMATURE GRANULOCYTES 0 0.0 - 0.5 %    ABS. NEUTROPHILS 5.3 1.8 - 8.0 K/UL    ABS. LYMPHOCYTES 2.2 0.9 - 3.6 K/UL    ABS. MONOCYTES 0.8 0.05 - 1.2 K/UL    ABS. EOSINOPHILS 0.2 0.0 - 0.4 K/UL    ABS. BASOPHILS 0.0 0.0 - 0.1 K/UL    ABS. IMM. GRANS. 0.0 0.00 - 0.04 K/UL    DF AUTOMATED     PROTHROMBIN TIME + INR    Collection Time: 01/23/23  7:29 PM   Result Value Ref Range    Prothrombin time 13.6 11.5 - 15.2 sec    INR 1.0 0.8 - 1.2     PTT    Collection Time: 01/23/23  7:29 PM   Result Value Ref Range    aPTT 34.2 23.0 - 92.4 SEC   METABOLIC PANEL, BASIC    Collection Time: 01/23/23  7:29 PM   Result Value Ref Range    Sodium 138 136 - 145 mmol/L    Potassium 4.2 3.5 - 5.5 mmol/L    Chloride 110 100 - 111 mmol/L    CO2 24 21 - 32 mmol/L    Anion gap 4 3.0 - 18 mmol/L    Glucose 88 74 - 99 mg/dL    BUN 19 (H) 7.0 - 18 MG/DL    Creatinine 0.71 0.6 - 1.3 MG/DL    BUN/Creatinine ratio 27 (H) 12 - 20      eGFR >60 >60 ml/min/1.73m2    Calcium 9.5 8.5 - 10.1 MG/DL        EKG: When ordered, EKG's are interpreted by the Emergency Department Physician in the absence of a cardiologist.  Please see their note for interpretation of EKG. RADIOLOGY:  Non-plain film images such as CT, Ultrasound and MRI are read by the radiologist. Plain radiographic images are visualized and preliminarily interpreted by the ED Provider with the below findings:          Interpretation per the Radiologist below, if available at the time of this note:     DUPLEX LOWER EXT VENOUS RIGHT    Result Date: 1/23/2023  · No evidence of deep vein thrombosis in the right lower extremity. In the posterior calf there is a hypoechoic mass measuring 7.7 x 1.3 cm.  Patient does have a Baker's cyst. This may represent hematoma or synovitis within a Baker's cyst. Follow-up is recommended to exclude underlying mass lesion extending sure resolution        PROCEDURES   Unless otherwise noted below, none  Procedures     CRITICAL CARE TIME       EMERGENCY DEPARTMENT COURSE and DIFFERENTIAL DIAGNOSIS/MDM Vitals:    Vitals:    01/23/23 1524   BP: 138/67   Pulse: 85   Resp: 16   Temp: 97.6 °F (36.4 °C)   SpO2: 98%   Weight: 54.4 kg (120 lb)   Height: 5' 5\" (1.651 m)        Patient was given the following medications:  Medications - No data to display    CONSULTS: (Who and What was discussed)  None    Chronic Conditions: COPD, GERD, hypertension, alpha thalassemia    Social Determinants affecting Dx or Tx: None    Records Reviewed (source and summary): Nursing Notes and Old Medical Records    CC/HPI Summary, DDx, ED Course, and Reassessment:   Leg/calf pain without injury on exam large hematoma posterior leg but neurovascularly intact. Vascular study performed to rule out DVT as patient was sent by her PCP to rule out clot. Does show Baker's cyst with fluid collection likely consistent with a hematoma. No evidence of compartment syndrome. CBC with normal platelet count and coags normal.  We will have patient follow-up with her primary doctor to reevaluate as an outpatient to rule out any other underlying mass         Disposition Considerations (Tests not done, Shared Decision Making, Pt Expectation of Test or Tx.):      FINAL IMPRESSION     1. Hematoma          DISPOSITION/PLAN   Rodolfo Darden  results have been reviewed with her. She has been counseled regarding her diagnosis, treatment, and plan. She verbally conveys understanding and agreement of the signs, symptoms, diagnosis, treatment and prognosis and additionally agrees to follow up as discussed. She also agrees with the care-plan and conveys that all of her questions have been answered. I have also provided discharge instructions for her that include: educational information regarding their diagnosis and treatment, and list of reasons why they would want to return to the ED prior to their follow-up appointment, should her condition change.      Labs Reviewed   CBC WITH AUTOMATED DIFF - Abnormal; Notable for the following components: Result Value    RBC 4.07 (*)     HGB 11.8 (*)     RDW 14.6 (*)     All other components within normal limits   METABOLIC PANEL, BASIC - Abnormal; Notable for the following components:    BUN 19 (*)     BUN/Creatinine ratio 27 (*)     All other components within normal limits   PROTHROMBIN TIME + INR   PTT        Recent Results (from the past 12 hour(s))   CBC WITH AUTOMATED DIFF    Collection Time: 01/23/23  7:29 PM   Result Value Ref Range    WBC 8.6 4.6 - 13.2 K/uL    RBC 4.07 (L) 4.20 - 5.30 M/uL    HGB 11.8 (L) 12.0 - 16.0 g/dL    HCT 36.5 35.0 - 45.0 %    MCV 89.7 78.0 - 100.0 FL    MCH 29.0 24.0 - 34.0 PG    MCHC 32.3 31.0 - 37.0 g/dL    RDW 14.6 (H) 11.6 - 14.5 %    PLATELET 561 977 - 766 K/uL    MPV 10.6 9.2 - 11.8 FL    NRBC 0.0 0  WBC    ABSOLUTE NRBC 0.00 0.00 - 0.01 K/uL    NEUTROPHILS 62 40 - 73 %    LYMPHOCYTES 25 21 - 52 %    MONOCYTES 9 3 - 10 %    EOSINOPHILS 2 0 - 5 %    BASOPHILS 1 0 - 2 %    IMMATURE GRANULOCYTES 0 0.0 - 0.5 %    ABS. NEUTROPHILS 5.3 1.8 - 8.0 K/UL    ABS. LYMPHOCYTES 2.2 0.9 - 3.6 K/UL    ABS. MONOCYTES 0.8 0.05 - 1.2 K/UL    ABS. EOSINOPHILS 0.2 0.0 - 0.4 K/UL    ABS. BASOPHILS 0.0 0.0 - 0.1 K/UL    ABS. IMM.  GRANS. 0.0 0.00 - 0.04 K/UL    DF AUTOMATED     PROTHROMBIN TIME + INR    Collection Time: 01/23/23  7:29 PM   Result Value Ref Range    Prothrombin time 13.6 11.5 - 15.2 sec    INR 1.0 0.8 - 1.2     PTT    Collection Time: 01/23/23  7:29 PM   Result Value Ref Range    aPTT 34.2 23.0 - 68.0 SEC   METABOLIC PANEL, BASIC    Collection Time: 01/23/23  7:29 PM   Result Value Ref Range    Sodium 138 136 - 145 mmol/L    Potassium 4.2 3.5 - 5.5 mmol/L    Chloride 110 100 - 111 mmol/L    CO2 24 21 - 32 mmol/L    Anion gap 4 3.0 - 18 mmol/L    Glucose 88 74 - 99 mg/dL    BUN 19 (H) 7.0 - 18 MG/DL    Creatinine 0.71 0.6 - 1.3 MG/DL    BUN/Creatinine ratio 27 (H) 12 - 20      eGFR >60 >60 ml/min/1.73m2    Calcium 9.5 8.5 - 10.1 MG/DL        DUPLEX LOWER EXT VENOUS RIGHT Final Result           CLINICAL IMPRESSION    1. Hematoma        Discharge Note: The patient is stable for discharge home. The signs, symptoms, diagnosis, and discharge instructions have been discussed, understanding conveyed, and agreed upon. The patient is to follow up as recommended or return to ER should their symptoms worsen. PATIENT REFERRED TO:  Follow-up Information       Follow up With Specialties Details Why Contact Info    Conchita Aguirre MD Family Medicine  follow up for recheck and to ensure resoultion [address] [facility] [address] [phone]      [facility] EMERGENCY DEPT Emergency Medicine   [address]  [phone]              DISCHARGE MEDICATIONS:  Current Discharge Medication List            DISCONTINUED MEDICATIONS:  Current Discharge Medication List          Shared Not Shared JULIAN: I have seen and evaluated the patient. My supervision physician was available for consultation. I am the Primary Clinician of Record. RANDI Brothers (electronically signed)    (Please note that parts of this dictation were completed with voice recognition software. Quite often unanticipated grammatical, syntax, homophones, and other interpretive errors are inadvertently transcribed by the computer software. Please disregards these errors.  Please excuse any errors that have escaped final proofreading.)

## 2023-01-24 NOTE — ED NOTES
Pt d/c with instructions and ambulatory with daughter. Pt vs and documented. No redness or swelling noted at iv site. Pt instructed to follow with pcp; no additional questions.

## 2023-10-01 ENCOUNTER — HOSPITAL ENCOUNTER (EMERGENCY)
Facility: HOSPITAL | Age: 78
Discharge: HOME | End: 2023-10-01
Attending: EMERGENCY MEDICINE
Payer: MEDICARE

## 2023-10-01 ENCOUNTER — APPOINTMENT (OUTPATIENT)
Facility: HOSPITAL | Age: 78
End: 2023-10-01
Payer: MEDICARE

## 2023-10-01 VITALS
OXYGEN SATURATION: 100 % | SYSTOLIC BLOOD PRESSURE: 109 MMHG | HEIGHT: 66 IN | BODY MASS INDEX: 20.09 KG/M2 | DIASTOLIC BLOOD PRESSURE: 91 MMHG | WEIGHT: 125 LBS | HEART RATE: 74 BPM | TEMPERATURE: 97.9 F | RESPIRATION RATE: 19 BRPM

## 2023-10-01 DIAGNOSIS — K29.00 ACUTE SUPERFICIAL GASTRITIS WITHOUT HEMORRHAGE: ICD-10-CM

## 2023-10-01 DIAGNOSIS — R10.13 ABDOMINAL PAIN, EPIGASTRIC: Primary | ICD-10-CM

## 2023-10-01 LAB
ALBUMIN SERPL-MCNC: 3.9 G/DL (ref 3.4–5)
ALBUMIN/GLOB SERPL: 0.9 (ref 0.8–1.7)
ALP SERPL-CCNC: 74 U/L (ref 45–117)
ALT SERPL-CCNC: 36 U/L (ref 13–56)
ANION GAP SERPL CALC-SCNC: 7 MMOL/L (ref 3–18)
AST SERPL-CCNC: 55 U/L (ref 10–38)
BASOPHILS # BLD: 0 K/UL (ref 0–0.1)
BASOPHILS NFR BLD: 0 % (ref 0–2)
BILIRUB SERPL-MCNC: 0.4 MG/DL (ref 0.2–1)
BUN SERPL-MCNC: 20 MG/DL (ref 7–18)
BUN/CREAT SERPL: 21 (ref 12–20)
CALCIUM SERPL-MCNC: 9.4 MG/DL (ref 8.5–10.1)
CHLORIDE SERPL-SCNC: 107 MMOL/L (ref 100–111)
CO2 SERPL-SCNC: 26 MMOL/L (ref 21–32)
CREAT SERPL-MCNC: 0.97 MG/DL (ref 0.6–1.3)
DIFFERENTIAL METHOD BLD: ABNORMAL
EKG ATRIAL RATE: 60 BPM
EKG DIAGNOSIS: NORMAL
EKG P AXIS: 113 DEGREES
EKG P-R INTERVAL: 146 MS
EKG Q-T INTERVAL: 440 MS
EKG QRS DURATION: 82 MS
EKG QTC CALCULATION (BAZETT): 440 MS
EKG R AXIS: 2 DEGREES
EKG T AXIS: -8 DEGREES
EKG VENTRICULAR RATE: 60 BPM
EOSINOPHIL # BLD: 0.2 K/UL (ref 0–0.4)
EOSINOPHIL NFR BLD: 3 % (ref 0–5)
ERYTHROCYTE [DISTWIDTH] IN BLOOD BY AUTOMATED COUNT: 14.2 % (ref 11.6–14.5)
GLOBULIN SER CALC-MCNC: 4.2 G/DL (ref 2–4)
GLUCOSE SERPL-MCNC: 144 MG/DL (ref 74–99)
HCT VFR BLD AUTO: 36.6 % (ref 35–45)
HGB BLD-MCNC: 11.9 G/DL (ref 12–16)
IMM GRANULOCYTES # BLD AUTO: 0 K/UL (ref 0–0.04)
IMM GRANULOCYTES NFR BLD AUTO: 0 % (ref 0–0.5)
LIPASE SERPL-CCNC: 183 U/L (ref 73–393)
LYMPHOCYTES # BLD: 0.8 K/UL (ref 0.9–3.6)
LYMPHOCYTES NFR BLD: 10 % (ref 21–52)
MCH RBC QN AUTO: 30.1 PG (ref 24–34)
MCHC RBC AUTO-ENTMCNC: 32.5 G/DL (ref 31–37)
MCV RBC AUTO: 92.4 FL (ref 78–100)
MONOCYTES # BLD: 0.1 K/UL (ref 0.05–1.2)
MONOCYTES NFR BLD: 1 % (ref 3–10)
NEUTS SEG # BLD: 6.5 K/UL (ref 1.8–8)
NEUTS SEG NFR BLD: 86 % (ref 40–73)
NRBC # BLD: 0 K/UL (ref 0–0.01)
NRBC BLD-RTO: 0 PER 100 WBC
PLATELET # BLD AUTO: 220 K/UL (ref 135–420)
PMV BLD AUTO: 10.4 FL (ref 9.2–11.8)
POTASSIUM SERPL-SCNC: 3.7 MMOL/L (ref 3.5–5.5)
PROT SERPL-MCNC: 8.1 G/DL (ref 6.4–8.2)
RBC # BLD AUTO: 3.96 M/UL (ref 4.2–5.3)
SODIUM SERPL-SCNC: 140 MMOL/L (ref 136–145)
TROPONIN I SERPL HS-MCNC: 11 NG/L (ref 0–54)
TROPONIN I SERPL HS-MCNC: 16 NG/L (ref 0–54)
WBC # BLD AUTO: 7.6 K/UL (ref 4.6–13.2)

## 2023-10-01 PROCEDURE — 6370000000 HC RX 637 (ALT 250 FOR IP): Performed by: EMERGENCY MEDICINE

## 2023-10-01 PROCEDURE — 99285 EMERGENCY DEPT VISIT HI MDM: CPT

## 2023-10-01 PROCEDURE — 2500000003 HC RX 250 WO HCPCS: Performed by: EMERGENCY MEDICINE

## 2023-10-01 PROCEDURE — 93005 ELECTROCARDIOGRAM TRACING: CPT | Performed by: EMERGENCY MEDICINE

## 2023-10-01 PROCEDURE — A4216 STERILE WATER/SALINE, 10 ML: HCPCS | Performed by: EMERGENCY MEDICINE

## 2023-10-01 PROCEDURE — 71045 X-RAY EXAM CHEST 1 VIEW: CPT

## 2023-10-01 PROCEDURE — 83690 ASSAY OF LIPASE: CPT

## 2023-10-01 PROCEDURE — 96374 THER/PROPH/DIAG INJ IV PUSH: CPT

## 2023-10-01 PROCEDURE — 84484 ASSAY OF TROPONIN QUANT: CPT

## 2023-10-01 PROCEDURE — 85025 COMPLETE CBC W/AUTO DIFF WBC: CPT

## 2023-10-01 PROCEDURE — 80053 COMPREHEN METABOLIC PANEL: CPT

## 2023-10-01 PROCEDURE — 93010 ELECTROCARDIOGRAM REPORT: CPT | Performed by: INTERNAL MEDICINE

## 2023-10-01 PROCEDURE — 2580000003 HC RX 258: Performed by: EMERGENCY MEDICINE

## 2023-10-01 RX ORDER — SERTRALINE HYDROCHLORIDE 100 MG/1
100 TABLET, FILM COATED ORAL DAILY
COMMUNITY
Start: 2023-08-03

## 2023-10-01 RX ORDER — CITALOPRAM 20 MG/1
20 TABLET ORAL DAILY
COMMUNITY

## 2023-10-01 RX ORDER — BUDESONIDE AND FORMOTEROL FUMARATE DIHYDRATE 80; 4.5 UG/1; UG/1
2 AEROSOL RESPIRATORY (INHALATION) 2 TIMES DAILY
COMMUNITY

## 2023-10-01 RX ORDER — FAMOTIDINE 20 MG/1
20 TABLET, FILM COATED ORAL DAILY PRN
Qty: 30 TABLET | Refills: 0 | Status: SHIPPED | OUTPATIENT
Start: 2023-10-01

## 2023-10-01 RX ORDER — LORAZEPAM 0.5 MG/1
0.5 TABLET ORAL EVERY 6 HOURS PRN
COMMUNITY

## 2023-10-01 RX ORDER — HYDROXYCHLOROQUINE SULFATE 200 MG/1
TABLET, FILM COATED ORAL DAILY
COMMUNITY

## 2023-10-01 RX ORDER — DICYCLOMINE HCL 20 MG
20 TABLET ORAL 4 TIMES DAILY PRN
Qty: 15 TABLET | Refills: 0 | Status: SHIPPED | OUTPATIENT
Start: 2023-10-01

## 2023-10-01 RX ORDER — CLONAZEPAM 0.5 MG/1
0.5 TABLET ORAL 3 TIMES DAILY
COMMUNITY

## 2023-10-01 RX ORDER — ACETAMINOPHEN 500 MG
1000 TABLET ORAL
Status: COMPLETED | OUTPATIENT
Start: 2023-10-01 | End: 2023-10-01

## 2023-10-01 RX ORDER — MIRTAZAPINE 15 MG/1
TABLET, FILM COATED ORAL
COMMUNITY
Start: 2023-08-03

## 2023-10-01 RX ORDER — GOLIMUMAB 50 MG/4ML
SOLUTION INTRAVENOUS
COMMUNITY

## 2023-10-01 RX ORDER — FEXOFENADINE HCL 180 MG/1
180 TABLET ORAL DAILY
COMMUNITY

## 2023-10-01 RX ORDER — AZITHROMYCIN 500 MG/1
TABLET, FILM COATED ORAL
COMMUNITY
Start: 2018-06-06

## 2023-10-01 RX ORDER — HYDROXYCHLOROQUINE SULFATE 200 MG/1
200 TABLET, FILM COATED ORAL DAILY
COMMUNITY
Start: 2023-07-12

## 2023-10-01 RX ADMIN — ACETAMINOPHEN 1000 MG: 500 TABLET ORAL at 06:01

## 2023-10-01 RX ADMIN — FAMOTIDINE 20 MG: 10 INJECTION INTRAVENOUS at 05:07

## 2023-10-01 RX ADMIN — ALUMINUM HYDROXIDE, MAGNESIUM HYDROXIDE, AND SIMETHICONE 40 ML: 200; 200; 20 SUSPENSION ORAL at 05:07

## 2023-10-01 ASSESSMENT — ENCOUNTER SYMPTOMS
ABDOMINAL PAIN: 1
RESPIRATORY NEGATIVE: 1
SORE THROAT: 0
TROUBLE SWALLOWING: 0
NAUSEA: 0
VOMITING: 0

## 2023-10-01 ASSESSMENT — PAIN DESCRIPTION - DESCRIPTORS: DESCRIPTORS: ACHING;CRAMPING;PRESSURE;SHARP

## 2023-10-01 ASSESSMENT — PAIN DESCRIPTION - ORIENTATION: ORIENTATION: RIGHT;LEFT

## 2023-10-01 ASSESSMENT — PAIN DESCRIPTION - LOCATION
LOCATION: ABDOMEN
LOCATION: HEAD

## 2023-10-01 ASSESSMENT — PAIN - FUNCTIONAL ASSESSMENT
PAIN_FUNCTIONAL_ASSESSMENT: 0-10
PAIN_FUNCTIONAL_ASSESSMENT: ACTIVITIES ARE NOT PREVENTED

## 2023-10-01 ASSESSMENT — PAIN SCALES - GENERAL
PAINLEVEL_OUTOF10: 8
PAINLEVEL_OUTOF10: 10

## 2023-10-01 NOTE — ED TRIAGE NOTES
Patient states Whit Nice was awakened by her abdominal pain at 0300\". Patient states \"it has been getting worse since last Wednesday\".

## 2023-10-16 NOTE — ED NOTES
Patient medicated per MAR.      Chris Ortega RN  10/01/23 7823
Patient medicated per MAR.      Nika Krishna RN  10/01/23 6392
Patient up for discharge. Discharge instructions reviewed with daughter and patient who verbalized understanding. Patient discharged to home. Patient discharged ambulatory. Valuables with patient.       Mary Roblero RN  10/01/23 2355
Patient

## 2024-08-18 ENCOUNTER — HOSPITAL ENCOUNTER (EMERGENCY)
Facility: HOSPITAL | Age: 79
Discharge: HOME | End: 2024-08-18
Attending: STUDENT IN AN ORGANIZED HEALTH CARE EDUCATION/TRAINING PROGRAM
Payer: MEDICARE

## 2024-08-18 ENCOUNTER — APPOINTMENT (OUTPATIENT)
Facility: HOSPITAL | Age: 79
End: 2024-08-18
Attending: STUDENT IN AN ORGANIZED HEALTH CARE EDUCATION/TRAINING PROGRAM
Payer: MEDICARE

## 2024-08-18 ENCOUNTER — APPOINTMENT (OUTPATIENT)
Facility: HOSPITAL | Age: 79
End: 2024-08-18
Payer: MEDICARE

## 2024-08-18 VITALS
HEIGHT: 66 IN | DIASTOLIC BLOOD PRESSURE: 77 MMHG | RESPIRATION RATE: 16 BRPM | TEMPERATURE: 97.9 F | SYSTOLIC BLOOD PRESSURE: 131 MMHG | WEIGHT: 115 LBS | OXYGEN SATURATION: 100 % | BODY MASS INDEX: 18.48 KG/M2 | HEART RATE: 65 BPM

## 2024-08-18 DIAGNOSIS — M25.561 RIGHT KNEE PAIN, UNSPECIFIED CHRONICITY: Primary | ICD-10-CM

## 2024-08-18 PROCEDURE — 73562 X-RAY EXAM OF KNEE 3: CPT

## 2024-08-18 PROCEDURE — 93971 EXTREMITY STUDY: CPT

## 2024-08-18 PROCEDURE — 99284 EMERGENCY DEPT VISIT MOD MDM: CPT

## 2024-08-18 PROCEDURE — 6370000000 HC RX 637 (ALT 250 FOR IP): Performed by: STUDENT IN AN ORGANIZED HEALTH CARE EDUCATION/TRAINING PROGRAM

## 2024-08-18 RX ORDER — LIDOCAINE 4 G/G
1 PATCH TOPICAL
Status: DISCONTINUED | OUTPATIENT
Start: 2024-08-18 | End: 2024-08-18 | Stop reason: HOSPADM

## 2024-08-18 ASSESSMENT — LIFESTYLE VARIABLES
HOW MANY STANDARD DRINKS CONTAINING ALCOHOL DO YOU HAVE ON A TYPICAL DAY: PATIENT DOES NOT DRINK
HOW OFTEN DO YOU HAVE A DRINK CONTAINING ALCOHOL: NEVER

## 2024-08-18 ASSESSMENT — PAIN - FUNCTIONAL ASSESSMENT: PAIN_FUNCTIONAL_ASSESSMENT: 0-10

## 2024-08-18 ASSESSMENT — PAIN DESCRIPTION - LOCATION: LOCATION: KNEE

## 2024-08-18 ASSESSMENT — PAIN DESCRIPTION - ORIENTATION: ORIENTATION: RIGHT

## 2024-08-18 ASSESSMENT — PAIN SCALES - GENERAL: PAINLEVEL_OUTOF10: 9

## 2024-08-18 NOTE — ED TRIAGE NOTES
Pt to ED for eval of right knee pain that started initially about a week ago and started to resolve. Pt had one episode of right leg \"giving out\" with pain over past week. Pt reports today while at [employer] pain became so severe she was unable to stand.

## 2024-08-18 NOTE — ED PROVIDER NOTES
needed (epigastric pain) 15 tablet 0    FOLIC ACID PO Take by mouth      albuterol sulfate HFA (PROVENTIL;VENTOLIN;PROAIR) 108 (90 Base) MCG/ACT inhaler Inhale 2 puffs into the lungs 2 times daily      apixaban (ELIQUIS) 2.5 MG TABS tablet Take 2.5 mg by mouth 2 times daily      ciprofloxacin (CIPRO) 500 MG tablet Take by mouth 2 times daily      cyclobenzaprine (FLEXERIL) 5 MG tablet Take 5 mg by mouth      DULoxetine (CYMBALTA) 30 MG extended release capsule Take 30 mg by mouth daily      Ferrous Sulfate 324 MG TBEC Take by mouth 2 times daily (with meals)      fluticasone (FLONASE) 50 MCG/ACT nasal spray 2 sprays by Nasal route daily         Past History     Past Medical History:  Past Medical History:   Diagnosis Date    Anxiety and depression     anxiety, depression    Asthma     Chronic obstructive pulmonary disease (HCC)     Coagulation disorder (HCC)     alpha thalassemia     GERD (gastroesophageal reflux disease)     Hypertension     -- no meds    Ill-defined condition     seasonal allergies    RA (rheumatoid arthritis) (HCC)        Past Surgical History:  Past Surgical History:   Procedure Laterality Date    APPENDECTOMY      CATARACT REMOVAL Bilateral      SECTION      x 2    CHOLECYSTECTOMY      COLONOSCOPY N/A 2021    COLONOSCOPY; POLYPECTOMY performed by FAN Mccabe MD at [facility] ENDOSCOPY    HERNIA REPAIR      ventral    MOHS SURGERY Right     ORTHOPEDIC SURGERY Bilateral     knee replacement    SHOULDER ARTHROSCOPY Right 2006    rotator cuff repair.     TONSILLECTOMY         Family History:  History reviewed. No pertinent family history.    Social History:  Social History     Tobacco Use    Smoking status: Never    Smokeless tobacco: Never   Substance Use Topics    Alcohol use: No    Drug use: No       Allergies:  Allergies   Allergen Reactions    Latex Anaphylaxis    Aspirin Anaphylaxis     Patient has tolerated with NSAIDS    Sulfa Antibiotics Anaphylaxis    Egg White (Egg

## 2024-08-18 NOTE — DISCHARGE INSTRUCTIONS
You were seen for knee pain today.  There is no evidence of blood clots at this time.  There is no evidence of fractures or dislocations at this time.  Please follow-up with your primary care doctor and orthopedics doctor for this pain.  Please return to the ED if you have increasing pain, swelling, redness, fevers, chills, nausea, vomiting, chest pain, shortness of breath or palpitations or any other alarming symptoms.

## 2024-08-23 LAB — ECHO BSA: 1.56 M2

## 2024-12-20 ENCOUNTER — APPOINTMENT (OUTPATIENT)
Facility: HOSPITAL | Age: 79
DRG: 378 | End: 2024-12-20
Payer: MEDICARE

## 2024-12-20 ENCOUNTER — HOSPITAL ENCOUNTER (INPATIENT)
Facility: HOSPITAL | Age: 79
LOS: 5 days | Discharge: HOME | DRG: 378 | End: 2024-12-25
Attending: EMERGENCY MEDICINE | Admitting: HOSPITALIST
Payer: MEDICARE

## 2024-12-20 DIAGNOSIS — R10.10 PAIN OF UPPER ABDOMEN: Primary | ICD-10-CM

## 2024-12-20 DIAGNOSIS — R55 SYNCOPE, UNSPECIFIED SYNCOPE TYPE: ICD-10-CM

## 2024-12-20 DIAGNOSIS — K92.2 GASTROINTESTINAL HEMORRHAGE, UNSPECIFIED GASTROINTESTINAL HEMORRHAGE TYPE: ICD-10-CM

## 2024-12-20 PROBLEM — Z22.7 TB LUNG, LATENT: Status: ACTIVE | Noted: 2021-05-11

## 2024-12-20 PROBLEM — M06.9 RHEUMATOID ARTHRITIS (HCC): Status: ACTIVE | Noted: 2024-12-20

## 2024-12-20 LAB
ALBUMIN SERPL-MCNC: 3.2 G/DL (ref 3.4–5)
ALBUMIN/GLOB SERPL: 1 (ref 0.8–1.7)
ALP SERPL-CCNC: 45 U/L (ref 45–117)
ALT SERPL-CCNC: 13 U/L (ref 13–56)
ANION GAP SERPL CALC-SCNC: 5 MMOL/L (ref 3–18)
APPEARANCE UR: CLEAR
AST SERPL-CCNC: 15 U/L (ref 10–38)
BACTERIA URNS QL MICRO: NEGATIVE /HPF
BASOPHILS # BLD: 0 K/UL (ref 0–0.1)
BASOPHILS NFR BLD: 0 % (ref 0–2)
BILIRUB SERPL-MCNC: 0.1 MG/DL (ref 0.2–1)
BILIRUB UR QL: NEGATIVE
BUN SERPL-MCNC: 48 MG/DL (ref 7–18)
BUN/CREAT SERPL: 63 (ref 12–20)
CALCIUM SERPL-MCNC: 8.4 MG/DL (ref 8.5–10.1)
CHLORIDE SERPL-SCNC: 110 MMOL/L (ref 100–111)
CO2 SERPL-SCNC: 26 MMOL/L (ref 21–32)
COLOR UR: YELLOW
CREAT SERPL-MCNC: 0.76 MG/DL (ref 0.6–1.3)
DIFFERENTIAL METHOD BLD: ABNORMAL
EKG ATRIAL RATE: 62 BPM
EKG DIAGNOSIS: NORMAL
EKG P AXIS: 70 DEGREES
EKG P-R INTERVAL: 132 MS
EKG Q-T INTERVAL: 446 MS
EKG QRS DURATION: 86 MS
EKG QTC CALCULATION (BAZETT): 452 MS
EKG R AXIS: 4 DEGREES
EKG T AXIS: 38 DEGREES
EKG VENTRICULAR RATE: 62 BPM
EOSINOPHIL # BLD: 0.1 K/UL (ref 0–0.4)
EOSINOPHIL NFR BLD: 1 % (ref 0–5)
EPITH CASTS URNS QL MICRO: NORMAL /LPF (ref 0–5)
ERYTHROCYTE [DISTWIDTH] IN BLOOD BY AUTOMATED COUNT: 14.9 % (ref 11.6–14.5)
GLOBULIN SER CALC-MCNC: 3.3 G/DL (ref 2–4)
GLUCOSE SERPL-MCNC: 122 MG/DL (ref 74–99)
GLUCOSE UR STRIP.AUTO-MCNC: NEGATIVE MG/DL
HCT VFR BLD AUTO: 27.3 % (ref 35–45)
HGB BLD-MCNC: 8.8 G/DL (ref 12–16)
HGB UR QL STRIP: NEGATIVE
HYALINE CASTS URNS QL MICRO: NORMAL /LPF (ref 0–2)
IMM GRANULOCYTES # BLD AUTO: 0.1 K/UL (ref 0–0.04)
IMM GRANULOCYTES NFR BLD AUTO: 1 % (ref 0–0.5)
KETONES UR QL STRIP.AUTO: NEGATIVE MG/DL
LACTATE BLD-SCNC: 3.64 MMOL/L (ref 0.4–2)
LACTATE BLD-SCNC: 5.82 MMOL/L (ref 0.4–2)
LEUKOCYTE ESTERASE UR QL STRIP.AUTO: ABNORMAL
LIPASE SERPL-CCNC: 24 U/L (ref 13–75)
LYMPHOCYTES # BLD: 1.2 K/UL (ref 0.9–3.6)
LYMPHOCYTES NFR BLD: 10 % (ref 21–52)
MCH RBC QN AUTO: 29.7 PG (ref 24–34)
MCHC RBC AUTO-ENTMCNC: 32.2 G/DL (ref 31–37)
MCV RBC AUTO: 92.2 FL (ref 78–100)
MONOCYTES # BLD: 0.7 K/UL (ref 0.05–1.2)
MONOCYTES NFR BLD: 6 % (ref 3–10)
NEUTS SEG # BLD: 9.9 K/UL (ref 1.8–8)
NEUTS SEG NFR BLD: 82 % (ref 40–73)
NITRITE UR QL STRIP.AUTO: NEGATIVE
NRBC # BLD: 0 K/UL (ref 0–0.01)
NRBC BLD-RTO: 0 PER 100 WBC
PH UR STRIP: 8 (ref 5–8)
PLATELET # BLD AUTO: 240 K/UL (ref 135–420)
PMV BLD AUTO: 10.9 FL (ref 9.2–11.8)
POTASSIUM SERPL-SCNC: 4.9 MMOL/L (ref 3.5–5.5)
PROT SERPL-MCNC: 6.5 G/DL (ref 6.4–8.2)
PROT UR STRIP-MCNC: NEGATIVE MG/DL
RBC # BLD AUTO: 2.96 M/UL (ref 4.2–5.3)
RBC #/AREA URNS HPF: NORMAL /HPF (ref 0–5)
SODIUM SERPL-SCNC: 141 MMOL/L (ref 136–145)
SP GR UR REFRACTOMETRY: 1.02 (ref 1–1.03)
TROPONIN I SERPL HS-MCNC: 12 NG/L (ref 0–54)
UROBILINOGEN UR QL STRIP.AUTO: 0.2 EU/DL (ref 0.2–1)
WBC # BLD AUTO: 12 K/UL (ref 4.6–13.2)
WBC URNS QL MICRO: NORMAL /HPF (ref 0–4)

## 2024-12-20 PROCEDURE — 85025 COMPLETE CBC W/AUTO DIFF WBC: CPT

## 2024-12-20 PROCEDURE — 1100000003 HC PRIVATE W/ TELEMETRY

## 2024-12-20 PROCEDURE — 99223 1ST HOSP IP/OBS HIGH 75: CPT | Performed by: PHYSICIAN ASSISTANT

## 2024-12-20 PROCEDURE — 6360000002 HC RX W HCPCS: Performed by: EMERGENCY MEDICINE

## 2024-12-20 PROCEDURE — 81001 URINALYSIS AUTO W/SCOPE: CPT

## 2024-12-20 PROCEDURE — 6370000000 HC RX 637 (ALT 250 FOR IP): Performed by: EMERGENCY MEDICINE

## 2024-12-20 PROCEDURE — 2580000003 HC RX 258: Performed by: EMERGENCY MEDICINE

## 2024-12-20 PROCEDURE — 70450 CT HEAD/BRAIN W/O DYE: CPT

## 2024-12-20 PROCEDURE — 93005 ELECTROCARDIOGRAM TRACING: CPT | Performed by: EMERGENCY MEDICINE

## 2024-12-20 PROCEDURE — 86900 BLOOD TYPING SEROLOGIC ABO: CPT

## 2024-12-20 PROCEDURE — 83550 IRON BINDING TEST: CPT

## 2024-12-20 PROCEDURE — 86923 COMPATIBILITY TEST ELECTRIC: CPT

## 2024-12-20 PROCEDURE — 85610 PROTHROMBIN TIME: CPT

## 2024-12-20 PROCEDURE — 83540 ASSAY OF IRON: CPT

## 2024-12-20 PROCEDURE — 85018 HEMOGLOBIN: CPT

## 2024-12-20 PROCEDURE — 83605 ASSAY OF LACTIC ACID: CPT

## 2024-12-20 PROCEDURE — 85014 HEMATOCRIT: CPT

## 2024-12-20 PROCEDURE — 86901 BLOOD TYPING SEROLOGIC RH(D): CPT

## 2024-12-20 PROCEDURE — 99285 EMERGENCY DEPT VISIT HI MDM: CPT

## 2024-12-20 PROCEDURE — 80053 COMPREHEN METABOLIC PANEL: CPT

## 2024-12-20 PROCEDURE — 83690 ASSAY OF LIPASE: CPT

## 2024-12-20 PROCEDURE — 36415 COLL VENOUS BLD VENIPUNCTURE: CPT

## 2024-12-20 PROCEDURE — 74176 CT ABD & PELVIS W/O CONTRAST: CPT

## 2024-12-20 PROCEDURE — 93010 ELECTROCARDIOGRAM REPORT: CPT | Performed by: INTERNAL MEDICINE

## 2024-12-20 PROCEDURE — 84484 ASSAY OF TROPONIN QUANT: CPT

## 2024-12-20 PROCEDURE — 85730 THROMBOPLASTIN TIME PARTIAL: CPT

## 2024-12-20 PROCEDURE — 86850 RBC ANTIBODY SCREEN: CPT

## 2024-12-20 RX ORDER — SODIUM CHLORIDE 9 MG/ML
INJECTION, SOLUTION INTRAVENOUS PRN
Status: DISCONTINUED | OUTPATIENT
Start: 2024-12-20 | End: 2024-12-25 | Stop reason: HOSPADM

## 2024-12-20 RX ORDER — VALACYCLOVIR HYDROCHLORIDE 500 MG/1
1000 TABLET, FILM COATED ORAL 2 TIMES DAILY
COMMUNITY
Start: 2024-05-06

## 2024-12-20 RX ORDER — ACETAMINOPHEN 325 MG/1
650 TABLET ORAL EVERY 6 HOURS PRN
Status: DISCONTINUED | OUTPATIENT
Start: 2024-12-20 | End: 2024-12-21

## 2024-12-20 RX ORDER — HYDROXYCHLOROQUINE SULFATE 200 MG/1
200 TABLET, FILM COATED ORAL DAILY
Status: DISCONTINUED | OUTPATIENT
Start: 2024-12-21 | End: 2024-12-25 | Stop reason: HOSPADM

## 2024-12-20 RX ORDER — SODIUM CHLORIDE 0.9 % (FLUSH) 0.9 %
5-40 SYRINGE (ML) INJECTION EVERY 12 HOURS SCHEDULED
Status: DISCONTINUED | OUTPATIENT
Start: 2024-12-20 | End: 2024-12-25 | Stop reason: HOSPADM

## 2024-12-20 RX ORDER — FLUTICASONE FUROATE, UMECLIDINIUM BROMIDE AND VILANTEROL TRIFENATATE 200; 62.5; 25 UG/1; UG/1; UG/1
1 POWDER RESPIRATORY (INHALATION) DAILY
COMMUNITY
Start: 2024-04-30 | End: 2026-04-21

## 2024-12-20 RX ORDER — IPRATROPIUM BROMIDE AND ALBUTEROL SULFATE 2.5; .5 MG/3ML; MG/3ML
1 SOLUTION RESPIRATORY (INHALATION) EVERY 4 HOURS PRN
Status: DISCONTINUED | OUTPATIENT
Start: 2024-12-20 | End: 2024-12-25 | Stop reason: HOSPADM

## 2024-12-20 RX ORDER — MIRTAZAPINE 15 MG/1
15 TABLET, FILM COATED ORAL NIGHTLY
Status: DISCONTINUED | OUTPATIENT
Start: 2024-12-21 | End: 2024-12-25 | Stop reason: HOSPADM

## 2024-12-20 RX ORDER — MELOXICAM 7.5 MG/1
1 TABLET ORAL DAILY
Status: ON HOLD | COMMUNITY
Start: 2024-08-06 | End: 2024-12-25 | Stop reason: HOSPADM

## 2024-12-20 RX ORDER — SODIUM CHLORIDE 0.9 % (FLUSH) 0.9 %
5-40 SYRINGE (ML) INJECTION PRN
Status: DISCONTINUED | OUTPATIENT
Start: 2024-12-20 | End: 2024-12-25 | Stop reason: HOSPADM

## 2024-12-20 RX ORDER — 0.9 % SODIUM CHLORIDE 0.9 %
1000 INTRAVENOUS SOLUTION INTRAVENOUS ONCE
Status: COMPLETED | OUTPATIENT
Start: 2024-12-20 | End: 2024-12-20

## 2024-12-20 RX ORDER — ACETAMINOPHEN 650 MG/1
650 SUPPOSITORY RECTAL EVERY 6 HOURS PRN
Status: DISCONTINUED | OUTPATIENT
Start: 2024-12-20 | End: 2024-12-21

## 2024-12-20 RX ORDER — BUDESONIDE 0.5 MG/2ML
1 INHALANT ORAL
Status: DISCONTINUED | OUTPATIENT
Start: 2024-12-21 | End: 2024-12-25

## 2024-12-20 RX ORDER — SODIUM CHLORIDE 9 MG/ML
INJECTION, SOLUTION INTRAVENOUS CONTINUOUS
Status: DISCONTINUED | OUTPATIENT
Start: 2024-12-21 | End: 2024-12-21

## 2024-12-20 RX ORDER — LACTULOSE 10 G/15ML
30 SOLUTION ORAL
Status: COMPLETED | OUTPATIENT
Start: 2024-12-20 | End: 2024-12-20

## 2024-12-20 RX ORDER — ARFORMOTEROL TARTRATE 15 UG/2ML
15 SOLUTION RESPIRATORY (INHALATION)
Status: DISCONTINUED | OUTPATIENT
Start: 2024-12-21 | End: 2024-12-25

## 2024-12-20 RX ORDER — ONDANSETRON 2 MG/ML
4 INJECTION INTRAMUSCULAR; INTRAVENOUS EVERY 6 HOURS PRN
Status: DISCONTINUED | OUTPATIENT
Start: 2024-12-20 | End: 2024-12-25 | Stop reason: HOSPADM

## 2024-12-20 RX ORDER — ONDANSETRON 4 MG/1
4 TABLET, ORALLY DISINTEGRATING ORAL EVERY 8 HOURS PRN
Status: DISCONTINUED | OUTPATIENT
Start: 2024-12-20 | End: 2024-12-25 | Stop reason: HOSPADM

## 2024-12-20 RX ORDER — DENOSUMAB 60 MG/ML
1 INJECTION SUBCUTANEOUS
COMMUNITY
Start: 2024-05-23

## 2024-12-20 RX ADMIN — LACTULOSE 30 G: 20 SOLUTION ORAL at 14:40

## 2024-12-20 RX ADMIN — SODIUM CHLORIDE 40 MG: 9 INJECTION INTRAMUSCULAR; INTRAVENOUS; SUBCUTANEOUS at 18:58

## 2024-12-20 RX ADMIN — SODIUM CHLORIDE 1000 ML: 9 INJECTION, SOLUTION INTRAVENOUS at 18:40

## 2024-12-20 RX ADMIN — SODIUM CHLORIDE 1000 ML: 9 INJECTION, SOLUTION INTRAVENOUS at 14:38

## 2024-12-20 ASSESSMENT — PAIN DESCRIPTION - LOCATION: LOCATION: ABDOMEN

## 2024-12-20 ASSESSMENT — PAIN SCALES - GENERAL
PAINLEVEL_OUTOF10: 8
PAINLEVEL_OUTOF10: 0

## 2024-12-20 NOTE — DISCHARGE INSTRUCTIONS
Start taking Protonix twice daily.  Follow with your primary care provider.  Avoid Mobic or any other over-the-counter anti-inflammatory agents such as Aleve/Motrin.  Come back to the ER if symptoms worsen or fail to improve.

## 2024-12-20 NOTE — ED PROVIDER NOTES
findings:        Interpretation per the Radiologist below, if available at the time of this note:    CT ABDOMEN PELVIS WO CONTRAST Additional Contrast? None   Final Result      There is no specific abdominal or pelvic finding to correlate with the patient's   symptoms.      There is a moderate moderate retained fecal material throughout the transverse   and descending colon as well as the distal rectum but without other significant   abnormality. The appendix is not seen but there is no significant pericecal   inflammatory change.      Small low-attenuation lesion is seen in the caudate lobe of the liver, possible   cyst, stable. No other hepatobiliary abnormalities are noted.      Scoliosis and spondylitic and disc degenerative changes in the lumbar spine, as   described. No critical canal stenosis.      2 small pulmonary nodules 4 mm or less in the right middle lobe, stable. No   other abnormality in the visualized lungs.         ===================   Note: [facility] maintains that all CT scans at their facilities   are performed by using dose optimization technique as appropriate to a performed   examination, to include automated exposure control, adjustment of the mAs and/or   kVp according to patient size (including appropriate matching for site specific   examinations) or use of iterative reconstruction technique.         Electronically signed by Josh Rico      CT HEAD WO CONTRAST    (Results Pending)         ED BEDSIDE ULTRASOUND:   Performed by ED Physician - none    LABS:  Labs Reviewed   CBC WITH AUTO DIFFERENTIAL - Abnormal; Notable for the following components:       Result Value    RBC 2.96 (*)     Hemoglobin 8.8 (*)     Hematocrit 27.3 (*)     RDW 14.9 (*)     Neutrophils % 82 (*)     Lymphocytes % 10 (*)     Immature Granulocytes % 1 (*)     Neutrophils Absolute 9.9 (*)     Immature Granulocytes Absolute 0.1 (*)     All other components within normal limits   COMPREHENSIVE METABOLIC  PANEL - Abnormal; Notable for the following components:    Glucose 122 (*)     BUN 48 (*)     BUN/Creatinine Ratio 63 (*)     Calcium 8.4 (*)     Total Bilirubin 0.1 (*)     Albumin 3.2 (*)     All other components within normal limits   URINALYSIS - Abnormal; Notable for the following components:    Leukocyte Esterase, Urine TRACE (*)     All other components within normal limits   POCT LACTIC ACID (LACTATE) - Abnormal; Notable for the following components:    POC Lactic Acid 3.64 (*)     All other components within normal limits   LIPASE   TROPONIN   URINALYSIS, MICRO       All other labs were within normal range or not returned as of this dictation.    EMERGENCY DEPARTMENT COURSE and DIFFERENTIAL DIAGNOSIS/MDM:   Vitals:    Vitals:    12/20/24 1330 12/20/24 1400 12/20/24 1430 12/20/24 1530   BP: (!) 105/54 137/68 104/74 121/67   Pulse:       Resp:       Temp:       TempSrc:       SpO2:       Weight:       Height:               Medical Decision Making  Patient has CAT scan which shows constipation.  Patient states she was to go home.  She has no other complaints.  Patient does look chronically ill-appearing.  Her grandson came in and said that she should go home because she passed out with fell on the ground.       Patient is on Eliquis and history of hep B and gastritis.  Lactic acid went up to 5.8 hemoglobin is 8.8 which is lower than it was last time she was seen.  Patient has guaiac positive stools.  Discussed with hospitalist will put GI on treatment team.  And text message about the patient    Amount and/or Complexity of Data Reviewed  Labs: ordered.  Radiology: ordered.  ECG/medicine tests: ordered.    Risk  Prescription drug management.  Decision regarding hospitalization.            REASSESSMENT          CRITICAL CARE TIME       CONSULTS:  None    PROCEDURES:  Unless otherwise noted below, none     Procedures      FINAL IMPRESSION      1. Pain of upper abdomen          DISPOSITION/PLAN   DISPOSITION  Decision To Discharge 12/20/2024 04:26:18 PM   DISPOSITION CONDITION Stable           PATIENT REFERRED TO:  Luisa Davidson MD  [address] [place] [id number] [phone]    Call today        DISCHARGE MEDICATIONS:  New Prescriptions    No medications on file     Controlled Substances Monitoring:          No data to display                (Please note that portions of this note were completed with a voice recognition program.  Efforts were made to edit the dictations but occasionally words are mis-transcribed.)    STEVEN PORTILLO MD (electronically signed)  Attending Emergency Physician            Steven Portillo MD  12/20/24 2290

## 2024-12-21 ENCOUNTER — APPOINTMENT (OUTPATIENT)
Facility: HOSPITAL | Age: 79
DRG: 378 | End: 2024-12-21
Payer: MEDICARE

## 2024-12-21 LAB
ANION GAP SERPL CALC-SCNC: 3 MMOL/L (ref 3–18)
APTT PPP: 29.4 SEC (ref 23–36.4)
BASOPHILS # BLD: 0 K/UL (ref 0–0.1)
BASOPHILS NFR BLD: 0 % (ref 0–2)
BUN SERPL-MCNC: 48 MG/DL (ref 7–18)
BUN/CREAT SERPL: 83 (ref 12–20)
CALCIUM SERPL-MCNC: 7.5 MG/DL (ref 8.5–10.1)
CHLORIDE SERPL-SCNC: 119 MMOL/L (ref 100–111)
CO2 SERPL-SCNC: 21 MMOL/L (ref 21–32)
CREAT SERPL-MCNC: 0.58 MG/DL (ref 0.6–1.3)
DIFFERENTIAL METHOD BLD: ABNORMAL
ECHO AO ASC DIAM: 4 CM
ECHO AO ASCENDING AORTA INDEX: 2.5 CM/M2
ECHO AO ROOT DIAM: 3.5 CM
ECHO AO ROOT INDEX: 2.19 CM/M2
ECHO AV PEAK GRADIENT: 9 MMHG
ECHO AV PEAK VELOCITY: 1.5 M/S
ECHO BSA: 1.59 M2
ECHO BSA: 1.59 M2
ECHO LA VOL A-L A2C: 31 ML (ref 22–52)
ECHO LA VOL A-L A4C: 27 ML (ref 22–52)
ECHO LA VOL BP: 29 ML (ref 22–52)
ECHO LA VOL MOD A2C: 30 ML (ref 22–52)
ECHO LA VOL MOD A4C: 25 ML (ref 22–52)
ECHO LA VOL/BSA BIPLANE: 18 ML/M2 (ref 16–34)
ECHO LA VOLUME AREA LENGTH: 30 ML
ECHO LA VOLUME INDEX A-L A2C: 19 ML/M2 (ref 16–34)
ECHO LA VOLUME INDEX A-L A4C: 17 ML/M2 (ref 16–34)
ECHO LA VOLUME INDEX AREA LENGTH: 19 ML/M2 (ref 16–34)
ECHO LA VOLUME INDEX MOD A2C: 19 ML/M2 (ref 16–34)
ECHO LA VOLUME INDEX MOD A4C: 16 ML/M2 (ref 16–34)
ECHO LV E' LATERAL VELOCITY: 13.35 CM/S
ECHO LV E' SEPTAL VELOCITY: 9.42 CM/S
ECHO LV EF PHYSICIAN: 60 %
ECHO LV FRACTIONAL SHORTENING: 31 % (ref 28–44)
ECHO LV INTERNAL DIMENSION DIASTOLE INDEX: 2.44 CM/M2
ECHO LV INTERNAL DIMENSION DIASTOLIC: 3.9 CM (ref 3.9–5.3)
ECHO LV INTERNAL DIMENSION SYSTOLIC INDEX: 1.69 CM/M2
ECHO LV INTERNAL DIMENSION SYSTOLIC: 2.7 CM
ECHO LV IVSD: 1.1 CM (ref 0.6–0.9)
ECHO LV MASS 2D: 122.1 G (ref 67–162)
ECHO LV MASS INDEX 2D: 76.3 G/M2 (ref 43–95)
ECHO LV POSTERIOR WALL DIASTOLIC: 0.9 CM (ref 0.6–0.9)
ECHO LV RELATIVE WALL THICKNESS RATIO: 0.46
ECHO LVOT AREA: 2.8 CM2
ECHO LVOT DIAM: 1.9 CM
ECHO MV A VELOCITY: 0.8 M/S
ECHO MV E DECELERATION TIME (DT): 200.3 MS
ECHO MV E VELOCITY: 0.57 M/S
ECHO MV E/A RATIO: 0.71
ECHO MV E/E' LATERAL: 4.27
ECHO MV E/E' RATIO (AVERAGED): 5.16
ECHO MV E/E' SEPTAL: 6.05
ECHO PV MAX VELOCITY: 1.1 M/S
ECHO PV PEAK GRADIENT: 4 MMHG
ECHO RA VOLUME: 29 ML
ECHO RA VOLUME: 31 ML
ECHO RV BASAL DIMENSION: 2.9 CM
ECHO RV TAPSE: 1.7 CM (ref 1.7–?)
ECHO TV REGURGITANT MAX VELOCITY: 2.58 M/S
ECHO TV REGURGITANT PEAK GRADIENT: 27 MMHG
EOSINOPHIL # BLD: 0.1 K/UL (ref 0–0.4)
EOSINOPHIL NFR BLD: 1 % (ref 0–5)
ERYTHROCYTE [DISTWIDTH] IN BLOOD BY AUTOMATED COUNT: 17.2 % (ref 11.6–14.5)
GLUCOSE BLD STRIP.AUTO-MCNC: 104 MG/DL (ref 70–110)
GLUCOSE SERPL-MCNC: 98 MG/DL (ref 74–99)
HCT VFR BLD AUTO: 18.5 % (ref 35–45)
HCT VFR BLD AUTO: 19 % (ref 35–45)
HCT VFR BLD AUTO: 20.7 % (ref 35–45)
HGB BLD-MCNC: 5.9 G/DL (ref 12–16)
HGB BLD-MCNC: 6.2 G/DL (ref 12–16)
HGB BLD-MCNC: 6.5 G/DL (ref 12–16)
HISTORY CHECK: NORMAL
IMM GRANULOCYTES # BLD AUTO: 0.1 K/UL (ref 0–0.04)
IMM GRANULOCYTES NFR BLD AUTO: 1 % (ref 0–0.5)
INR PPP: 1.2 (ref 0.9–1.1)
IRON SATN MFR SERPL: 84 % (ref 20–50)
IRON SERPL-MCNC: 175 UG/DL (ref 50–175)
LACTATE SERPL-SCNC: 1.9 MMOL/L (ref 0.4–2)
LACTATE SERPL-SCNC: 2.9 MMOL/L (ref 0.4–2)
LYMPHOCYTES # BLD: 1.5 K/UL (ref 0.9–3.6)
LYMPHOCYTES NFR BLD: 12 % (ref 21–52)
MCH RBC QN AUTO: 29.5 PG (ref 24–34)
MCHC RBC AUTO-ENTMCNC: 32.6 G/DL (ref 31–37)
MCV RBC AUTO: 90.5 FL (ref 78–100)
MONOCYTES # BLD: 1.1 K/UL (ref 0.05–1.2)
MONOCYTES NFR BLD: 9 % (ref 3–10)
NEUTS SEG # BLD: 9.4 K/UL (ref 1.8–8)
NEUTS SEG NFR BLD: 77 % (ref 40–73)
NRBC # BLD: 0 K/UL (ref 0–0.01)
NRBC BLD-RTO: 0 PER 100 WBC
PLATELET # BLD AUTO: 140 K/UL (ref 135–420)
PLATELET COMMENT: ABNORMAL
PMV BLD AUTO: 10.9 FL (ref 9.2–11.8)
POTASSIUM SERPL-SCNC: 4.7 MMOL/L (ref 3.5–5.5)
PROTHROMBIN TIME: 15.5 SEC (ref 11.9–14.9)
RBC # BLD AUTO: 2.1 M/UL (ref 4.2–5.3)
RBC MORPH BLD: ABNORMAL
RBC MORPH BLD: ABNORMAL
SODIUM SERPL-SCNC: 143 MMOL/L (ref 136–145)
TIBC SERPL-MCNC: 209 UG/DL (ref 250–450)
TROPONIN I SERPL HS-MCNC: 15 NG/L (ref 0–54)
VAS AORTA PROX AP: 1.47 CM
VAS AORTA PROX PSV: 53.8 CM/S
VAS AORTA PROX TR: 1.53 CM
VAS CELIAC EDV: 29.8 CM/S
VAS CELIAC PSV: 163.4 CM/S
VAS COMMON HEPATIC EDV: 28.8 CM/S
VAS COMMON HEPATIC PSV: 125.8 CM/S
VAS SPLENIC EDV: 32 CM/S
VAS SPLENIC PSV: 135.5 CM/S
WBC # BLD AUTO: 12.2 K/UL (ref 4.6–13.2)

## 2024-12-21 PROCEDURE — 93306 TTE W/DOPPLER COMPLETE: CPT

## 2024-12-21 PROCEDURE — 36430 TRANSFUSION BLD/BLD COMPNT: CPT

## 2024-12-21 PROCEDURE — 1100000003 HC PRIVATE W/ TELEMETRY

## 2024-12-21 PROCEDURE — 6370000000 HC RX 637 (ALT 250 FOR IP): Performed by: PHYSICIAN ASSISTANT

## 2024-12-21 PROCEDURE — 93975 VASCULAR STUDY: CPT | Performed by: INTERNAL MEDICINE

## 2024-12-21 PROCEDURE — 2580000003 HC RX 258: Performed by: HEALTH CARE PROVIDER

## 2024-12-21 PROCEDURE — 94640 AIRWAY INHALATION TREATMENT: CPT

## 2024-12-21 PROCEDURE — 93306 TTE W/DOPPLER COMPLETE: CPT | Performed by: INTERNAL MEDICINE

## 2024-12-21 PROCEDURE — P9016 RBC LEUKOCYTES REDUCED: HCPCS

## 2024-12-21 PROCEDURE — 2580000003 HC RX 258: Performed by: PHYSICIAN ASSISTANT

## 2024-12-21 PROCEDURE — 99233 SBSQ HOSP IP/OBS HIGH 50: CPT | Performed by: STUDENT IN AN ORGANIZED HEALTH CARE EDUCATION/TRAINING PROGRAM

## 2024-12-21 PROCEDURE — 6360000002 HC RX W HCPCS: Performed by: PHYSICIAN ASSISTANT

## 2024-12-21 PROCEDURE — 6360000002 HC RX W HCPCS

## 2024-12-21 PROCEDURE — 2500000003 HC RX 250 WO HCPCS: Performed by: HEALTH CARE PROVIDER

## 2024-12-21 PROCEDURE — 6360000002 HC RX W HCPCS: Performed by: HEALTH CARE PROVIDER

## 2024-12-21 PROCEDURE — 84484 ASSAY OF TROPONIN QUANT: CPT

## 2024-12-21 PROCEDURE — 6370000000 HC RX 637 (ALT 250 FOR IP): Performed by: STUDENT IN AN ORGANIZED HEALTH CARE EDUCATION/TRAINING PROGRAM

## 2024-12-21 PROCEDURE — 85025 COMPLETE CBC W/AUTO DIFF WBC: CPT

## 2024-12-21 PROCEDURE — 30233N1 TRANSFUSION OF NONAUTOLOGOUS RED BLOOD CELLS INTO PERIPHERAL VEIN, PERCUTANEOUS APPROACH: ICD-10-PCS | Performed by: STUDENT IN AN ORGANIZED HEALTH CARE EDUCATION/TRAINING PROGRAM

## 2024-12-21 PROCEDURE — 83605 ASSAY OF LACTIC ACID: CPT

## 2024-12-21 PROCEDURE — 80048 BASIC METABOLIC PNL TOTAL CA: CPT

## 2024-12-21 PROCEDURE — 93975 VASCULAR STUDY: CPT

## 2024-12-21 PROCEDURE — 82962 GLUCOSE BLOOD TEST: CPT

## 2024-12-21 RX ORDER — SODIUM CHLORIDE 9 MG/ML
INJECTION, SOLUTION INTRAVENOUS PRN
Status: DISCONTINUED | OUTPATIENT
Start: 2024-12-21 | End: 2024-12-22

## 2024-12-21 RX ORDER — ACETAMINOPHEN 325 MG/1
650 TABLET ORAL EVERY 6 HOURS PRN
Status: DISCONTINUED | OUTPATIENT
Start: 2024-12-21 | End: 2024-12-25 | Stop reason: HOSPADM

## 2024-12-21 RX ORDER — SODIUM CHLORIDE 9 MG/ML
INJECTION, SOLUTION INTRAVENOUS PRN
Status: DISCONTINUED | OUTPATIENT
Start: 2024-12-21 | End: 2024-12-21

## 2024-12-21 RX ORDER — ACETAMINOPHEN 650 MG/1
650 SUPPOSITORY RECTAL EVERY 6 HOURS PRN
Status: DISCONTINUED | OUTPATIENT
Start: 2024-12-21 | End: 2024-12-25 | Stop reason: HOSPADM

## 2024-12-21 RX ADMIN — MIRTAZAPINE 15 MG: 15 TABLET, FILM COATED ORAL at 22:48

## 2024-12-21 RX ADMIN — ARFORMOTEROL TARTRATE 15 MCG: 15 SOLUTION RESPIRATORY (INHALATION) at 07:32

## 2024-12-21 RX ADMIN — GLUCAGON HYDROCHLORIDE 1 MG: KIT at 19:17

## 2024-12-21 RX ADMIN — SODIUM CHLORIDE 40 MG: 9 INJECTION INTRAMUSCULAR; INTRAVENOUS; SUBCUTANEOUS at 19:46

## 2024-12-21 RX ADMIN — IPRATROPIUM BROMIDE 0.5 MG: 0.5 SOLUTION RESPIRATORY (INHALATION) at 01:56

## 2024-12-21 RX ADMIN — ARFORMOTEROL TARTRATE 15 MCG: 15 SOLUTION RESPIRATORY (INHALATION) at 19:08

## 2024-12-21 RX ADMIN — SODIUM CHLORIDE, PRESERVATIVE FREE 10 ML: 5 INJECTION INTRAVENOUS at 10:47

## 2024-12-21 RX ADMIN — ARFORMOTEROL TARTRATE 15 MCG: 15 SOLUTION RESPIRATORY (INHALATION) at 01:56

## 2024-12-21 RX ADMIN — SODIUM CHLORIDE: 9 INJECTION, SOLUTION INTRAVENOUS at 01:27

## 2024-12-21 RX ADMIN — BUDESONIDE 1000 MCG: 0.5 INHALANT RESPIRATORY (INHALATION) at 01:55

## 2024-12-21 RX ADMIN — HYDROXYCHLOROQUINE SULFATE 200 MG: 200 TABLET ORAL at 10:46

## 2024-12-21 RX ADMIN — Medication 27.5 MILLICURIE: at 17:40

## 2024-12-21 RX ADMIN — SODIUM CHLORIDE, PRESERVATIVE FREE 10 ML: 5 INJECTION INTRAVENOUS at 19:46

## 2024-12-21 RX ADMIN — BUDESONIDE 1000 MCG: 0.5 INHALANT RESPIRATORY (INHALATION) at 07:33

## 2024-12-21 RX ADMIN — IPRATROPIUM BROMIDE 0.5 MG: 0.5 SOLUTION RESPIRATORY (INHALATION) at 15:45

## 2024-12-21 RX ADMIN — ACETAMINOPHEN 650 MG: 325 TABLET ORAL at 13:56

## 2024-12-21 RX ADMIN — IPRATROPIUM BROMIDE 0.5 MG: 0.5 SOLUTION RESPIRATORY (INHALATION) at 07:33

## 2024-12-21 RX ADMIN — IPRATROPIUM BROMIDE 0.5 MG: 0.5 SOLUTION RESPIRATORY (INHALATION) at 19:08

## 2024-12-21 RX ADMIN — BUDESONIDE 1000 MCG: 0.5 INHALANT RESPIRATORY (INHALATION) at 19:08

## 2024-12-21 RX ADMIN — SERTRALINE HYDROCHLORIDE 100 MG: 50 TABLET ORAL at 10:46

## 2024-12-21 ASSESSMENT — PAIN DESCRIPTION - DESCRIPTORS: DESCRIPTORS: ACHING

## 2024-12-21 ASSESSMENT — PAIN DESCRIPTION - LOCATION: LOCATION: HEAD

## 2024-12-21 ASSESSMENT — PAIN DESCRIPTION - ORIENTATION: ORIENTATION: LEFT;RIGHT

## 2024-12-21 ASSESSMENT — PAIN SCALES - GENERAL
PAINLEVEL_OUTOF10: 9
PAINLEVEL_OUTOF10: 0

## 2024-12-21 NOTE — CONSENT
Informed Consent for Blood Component Transfusion Note    I have discussed with the patient the rationale for blood component transfusion; its benefits in treating or preventing fatigue, organ damage, or death; and its risk which includes mild transfusion reactions, rare risk of blood borne infection, or more serious but rare reactions. I have discussed the alternatives to transfusion, including the risk and consequences of not receiving transfusion. The patient had an opportunity to ask questions and had agreed to proceed with transfusion of blood components.    Electronically signed by Andrea Chappell DO on 12/21/24 at 1:27 AM RENA Chappell DO  [facility]  Hospitalist  Internal Medicine

## 2024-12-21 NOTE — PROGRESS NOTES
0730: Bedside and Verbal shift change report given to Eva RN and Leydi RN (oncoming nurse) by DARSHAN Quinn (offgoing nurse). Report included the following information Nurse Handoff Report, Index, Adult Overview, Intake/Output, MAR, Cardiac Rhythm NSR-ST, Alarm Parameters, and Quality Measures.      0800: Pt EDILIA at US for vascular duplex of abdomen.     1025: md informed of new hgb, 6.2, new orders obtained for 1 unit of blood to be given again.    1140: Echo at bedside.    1220: Blood administration started at 60 ml/hr. VSS, consents signed and RN at bedside for 1st 15 min.    1235: VSS, blood increased to 125 ml/hr. Pt tolerating well.     1520: RN called  for the on call nuc med tech to come in to complete pt's scan today. Pt resumed back on NPO diet.    1640: Blood transfusion stopped, nuc med scan to be completed, pt transport here. Tele notified, off tele orders obtained.  1830: Pt back on floor, unable to complete nuc med scan due to multiple Bms during scan. Pt sent back up to floor. Oxygen 2L was added.    1845; RRT CALLED, Pt had 5 bloody Bms within an hour. Pt  has new onset of SOB and new c/o abdominal pain and jaw pain, EKG performed, BG checked, pt BG 63, IM glucagon given for new BG of 104 upon recheck, 1 unit of blood ready for administration, labs of H & H, and troponin ordered, /62 manual, pt restless and anxious at this time, A&O x4. RRT team at bedside. Pt increased to 5L NC    1900: Dr. Roberts aware of critical hgb of 5.9, orders to transfuse another unit received. Pt is to receive another unit of RBCS and BB will have another unit on hold ready to transfuse after the first unit if needed. Updated HGB 5.9    1950: Bedside and Verbal shift change report given to DARSHAN Quinn (oncoming nurse) by DARSHAN Velasquez and DARSHAN Holley (offgoing nurse). Report included the following information Nurse Handoff Report, Index, Adult Overview, Intake/Output, MAR, Recent Results, Cardiac Rhythm NSR-ST, Alarm  Parameters, and Quality Measures.

## 2024-12-21 NOTE — CARE COORDINATION
12/21/24 1145   Service Assessment   Patient Orientation Alert and Oriented;Person;Place;Situation   Cognition Alert   History Provided By Patient   Primary Caregiver Self   Support Systems Children   Patient's Healthcare Decision Maker is: Legal Next of Kin   PCP Verified by CM Yes   Last Visit to PCP Within last 6 months   Prior Functional Level Independent in ADLs/IADLs   Current Functional Level Independent in ADLs/IADLs   Can patient return to prior living arrangement Yes   Ability to make needs known: Good   Family able to assist with home care needs: Yes   Would you like for me to discuss the discharge plan with any other family members/significant others, and if so, who? Yes  (Daughter)   Financial Resources Medicare   Community Resources None   Social/Functional History   Lives With Daughter   Type of Home House   Home Layout Two level   Home Access Stairs to enter with rails   Entrance Stairs - Number of Steps 5   Entrance Stairs - Rails Both   Bathroom Toilet Standard   Bathroom Accessibility Accessible   Home Equipment Walker - Rolling;Cane   Receives Help From Family   Prior Level of Assist for ADLs Independent   Prior Level of Assist for Homemaking Independent   Homemaking Responsibilities Yes   Ambulation Assistance Independent   Prior Level of Assist for Transfers Independent   Active  No   Patient's  Info Daughter transports   Occupation Retired   Discharge Planning   Type of Residence House   Living Arrangements Family Members   Current Services Prior To Admission None   Potential Assistance Needed N/A   DME Ordered? No   Potential Assistance Purchasing Medications No   Type of Home Care Services None   Patient expects to be discharged to: House   One/Two Story Residence Two story   Lift Chair Available No   Services At/After Discharge   Transition of Care Consult (CM Consult) Discharge Planning    Resource Information Provided? No   Mode of Transport at Discharge Other (see  comment)  (Daughter can transport)   Confirm Follow Up Transport Self   Condition of Participation: Discharge Planning   The Plan for Transition of Care is related to the following treatment goals: Pending medical recs. if home daughter will transport.     GÉNESIS Eaton     [phone]

## 2024-12-21 NOTE — H&P
leads  Confirmed by MARIKA Duenas Dena ([id number]) on 12/20/2024 6:25:52 PM     POCT lactic acid (lactate)    Collection Time: 12/20/24  1:04 PM   Result Value Ref Range    POC Lactic Acid 3.64 (HH) 0.40 - 2.00 mmol/L   CBC with Diff    Collection Time: 12/20/24  1:06 PM   Result Value Ref Range    WBC 12.0 4.6 - 13.2 K/uL    RBC 2.96 (L) 4.20 - 5.30 M/uL    Hemoglobin 8.8 (L) 12.0 - 16.0 g/dL    Hematocrit 27.3 (L) 35.0 - 45.0 %    MCV 92.2 78.0 - 100.0 FL    MCH 29.7 24.0 - 34.0 PG    MCHC 32.2 31.0 - 37.0 g/dL    RDW 14.9 (H) 11.6 - 14.5 %    Platelets 240 135 - 420 K/uL    MPV 10.9 9.2 - 11.8 FL    Nucleated RBCs 0.0 0  WBC    nRBC 0.00 0.00 - 0.01 K/uL    Neutrophils % 82 (H) 40 - 73 %    Lymphocytes % 10 (L) 21 - 52 %    Monocytes % 6 3 - 10 %    Eosinophils % 1 0 - 5 %    Basophils % 0 0 - 2 %    Immature Granulocytes % 1 (H) 0.0 - 0.5 %    Neutrophils Absolute 9.9 (H) 1.8 - 8.0 K/UL    Lymphocytes Absolute 1.2 0.9 - 3.6 K/UL    Monocytes Absolute 0.7 0.05 - 1.2 K/UL    Eosinophils Absolute 0.1 0.0 - 0.4 K/UL    Basophils Absolute 0.0 0.0 - 0.1 K/UL    Immature Granulocytes Absolute 0.1 (H) 0.00 - 0.04 K/UL    Differential Type AUTOMATED     CMP    Collection Time: 12/20/24  1:06 PM   Result Value Ref Range    Sodium 141 136 - 145 mmol/L    Potassium 4.9 3.5 - 5.5 mmol/L    Chloride 110 100 - 111 mmol/L    CO2 26 21 - 32 mmol/L    Anion Gap 5 3.0 - 18 mmol/L    Glucose 122 (H) 74 - 99 mg/dL    BUN 48 (H) 7.0 - 18 MG/DL    Creatinine 0.76 0.6 - 1.3 MG/DL    BUN/Creatinine Ratio 63 (H) 12 - 20      Est, Glom Filt Rate 80 >60 ml/min/1.73m2    Calcium 8.4 (L) 8.5 - 10.1 MG/DL    Total Bilirubin 0.1 (L) 0.2 - 1.0 MG/DL    ALT 13 13 - 56 U/L    AST 15 10 - 38 U/L    Alk Phosphatase 45 45 - 117 U/L    Total Protein 6.5 6.4 - 8.2 g/dL    Albumin 3.2 (L) 3.4 - 5.0 g/dL    Globulin 3.3 2.0 - 4.0 g/dL    Albumin/Globulin Ratio 1.0 0.8 - 1.7     Lipase    Collection Time: 12/20/24  1:06 PM   Result Value Ref Range     Lipase 24 13 - 75 U/L   Troponin    Collection Time: 12/20/24  1:06 PM   Result Value Ref Range    Troponin, High Sensitivity 12 0 - 54 ng/L   Urinalysis    Collection Time: 12/20/24  3:50 PM   Result Value Ref Range    Color, UA YELLOW      Appearance CLEAR      Specific Gravity, UA 1.025 1.005 - 1.030      pH, Urine 8.0 5.0 - 8.0      Protein, UA Negative NEG mg/dL    Glucose, Ur Negative NEG mg/dL    Ketones, Urine Negative NEG mg/dL    Bilirubin, Urine Negative NEG      Blood, Urine Negative NEG      Urobilinogen, Urine 0.2 0.2 - 1.0 EU/dL    Nitrite, Urine Negative NEG      Leukocyte Esterase, Urine TRACE (A) NEG     Urinalysis, Micro    Collection Time: 12/20/24  3:50 PM   Result Value Ref Range    WBC, UA 0-3 0 - 4 /hpf    RBC, UA NONE 0 - 5 /hpf    Epithelial Cells, UA 1+ 0 - 5 /lpf    BACTERIA, URINE Negative NEG /hpf    Hyaline Casts, UA 0-3 0 - 2 /lpf   POCT lactic acid (lactate)    Collection Time: 12/20/24  6:25 PM   Result Value Ref Range    POC Lactic Acid 5.82 (HH) 0.40 - 2.00 mmol/L       Imaging Reviewed:  CT HEAD WO CONTRAST    Result Date: 12/20/2024  Head CT without contrast HISTORY: Syncope COMPARISON: None Technique: CT images of the head were obtained. All CT scans at this facility are performed using dose optimization technique as appropriate to the performed exam, to include automated exposure control, adjustment of the mA and/or kV according to patient's size (Including appropriate matching for site-specific examinations), or use of iterative reconstruction technique. FINDINGS: No intracranial hemorrhage, extra-axial fluid collection or mass effect.  No shift of midline structures.  No evidence for acute ischemia. Mild generalized volume loss and minimal burden periventricular and deep white matter low attenuation, nonspecific, but most consistent with chronic small vessel ischemic disease. Intact osseous structures.  The visualized paranasal air sinuses are aerated.     No evidence of

## 2024-12-21 NOTE — CONSENT
Informed Consent for Blood Component Transfusion Note    I have discussed with the patient the rationale for blood component transfusion; its benefits in treating or preventing fatigue, organ damage, or death; and its risk which includes mild transfusion reactions, rare risk of blood borne infection, or more serious but rare reactions. I have discussed the alternatives to transfusion, including the risk and consequences of not receiving transfusion. The patient had an opportunity to ask questions and had agreed to proceed with transfusion of blood components.      Jeremy Valiente DO  12/21/2024 12:26 PM

## 2024-12-21 NOTE — PROGRESS NOTES
[facility]  Progress Note    Patient: Annamarie Velásquez Age: 79 y.o. : 1945 MR#: [id number] SSN: [id number]  Date/Time: 2024     Chief Complaint   Patient presents with    Abdominal Pain       Subjective:   Patient seen and examined.  Admitted yesterday.  In good spirits.  Reports having 2 melenic stools earlier this morning. denies chest pain, abdominal pain, shortness of breath, nausea or emesis.   Assessment/Plan:   Acute on chronic anemia: underlying alpha thalassemia  Suspected upper GIB: hx NSAID use  GERD  - IV PPI BID. Monitor Hgb, transfuse to keep>7. S/p 2u pRBC. Follow H/H.   -GI input appreciated.  Clear liquid diet.   - avoid NSAIDs or chemical DVT ppx, place SCDs.     Lactic acidosis, resolved  - check mesenteric duplex. Cont IVF.   - IV fluids     Syncope and collapse  - echo, orthostats, IVF, Tele  RA  - cont hydroxychloroquine     Anxiety  Depression  - cont sertraline     COPD, not in active exacerbation.  - cont trelegy or formulary equivalent, prn Duonebs.      Dispo plan:  anticipated discharge date 24    I spent 50 minutes with the patient in face-to-face consultation, of which greater than 50% was spent in counseling and coordination of care as described above.    Family Communication/Updates:  [x]  Patient is clinically able to contact and update Family  []  Patient is NOT clinically able to contact and update Family; Patient requires Clinician to contact and update Family     Case discussed with:  [x]Patient  []Family  [x]Nursing  []Case Management  DVT Prophylaxis:  []Lovenox  []Hep SQ  [x]SCDs  []Coumadin   []Eliquis/Xarelto     Objective:   VS: /75   Pulse 99   Temp 97.2 °F (36.2 °C) (Oral)   Resp 20   Ht 1.676 m (5' 6\")   Wt 54 kg (119 lb)   SpO2 100%   BMI 19.21 kg/m²    Tmax/24hrs: Temp (24hrs), Av.4 °F (36.9 °C), Min:97.2 °F (36.2 °C), Max:99.1 °F (37.3 °C)  IOBRIEF  Intake/Output Summary (Last 24 hours)  call our department.

## 2024-12-22 ENCOUNTER — ANESTHESIA EVENT (OUTPATIENT)
Facility: HOSPITAL | Age: 79
End: 2024-12-22
Payer: MEDICARE

## 2024-12-22 ENCOUNTER — ANESTHESIA (OUTPATIENT)
Facility: HOSPITAL | Age: 79
End: 2024-12-22
Payer: MEDICARE

## 2024-12-22 LAB
ABO + RH BLD: NORMAL
ANION GAP SERPL CALC-SCNC: 4 MMOL/L (ref 3–18)
BASOPHILS # BLD: 0 K/UL (ref 0–0.1)
BASOPHILS NFR BLD: 0 % (ref 0–2)
BLD PROD TYP BPU: NORMAL
BLOOD BANK BLOOD PRODUCT EXPIRATION DATE: NORMAL
BLOOD BANK DISPENSE STATUS: NORMAL
BLOOD BANK ISBT PRODUCT BLOOD TYPE: 6200
BLOOD BANK PRODUCT CODE: NORMAL
BLOOD BANK UNIT TYPE AND RH: NORMAL
BLOOD GROUP ANTIBODIES SERPL: NORMAL
BPU ID: NORMAL
BUN SERPL-MCNC: 45 MG/DL (ref 7–18)
BUN/CREAT SERPL: 67 (ref 12–20)
CALCIUM SERPL-MCNC: 7.3 MG/DL (ref 8.5–10.1)
CALLED TO: NORMAL
CALLED TO:,BCALL3: NORMAL
CALLED TO:: NORMAL
CHLORIDE SERPL-SCNC: 119 MMOL/L (ref 100–111)
CO2 SERPL-SCNC: 20 MMOL/L (ref 21–32)
CREAT SERPL-MCNC: 0.67 MG/DL (ref 0.6–1.3)
CROSSMATCH RESULT: NORMAL
DIFFERENTIAL METHOD BLD: ABNORMAL
EOSINOPHIL # BLD: 0.1 K/UL (ref 0–0.4)
EOSINOPHIL NFR BLD: 0 % (ref 0–5)
ERYTHROCYTE [DISTWIDTH] IN BLOOD BY AUTOMATED COUNT: 15.8 % (ref 11.6–14.5)
GLUCOSE BLD STRIP.AUTO-MCNC: 63 MG/DL (ref 70–110)
GLUCOSE SERPL-MCNC: 111 MG/DL (ref 74–99)
HCT VFR BLD AUTO: 24.9 % (ref 35–45)
HCT VFR BLD AUTO: 26 % (ref 35–45)
HCT VFR BLD AUTO: 27 % (ref 35–45)
HGB BLD-MCNC: 8.5 G/DL (ref 12–16)
HGB BLD-MCNC: 8.6 G/DL (ref 12–16)
HGB BLD-MCNC: 8.8 G/DL (ref 12–16)
IMM GRANULOCYTES # BLD AUTO: 0.1 K/UL (ref 0–0.04)
IMM GRANULOCYTES NFR BLD AUTO: 1 % (ref 0–0.5)
LYMPHOCYTES # BLD: 1.3 K/UL (ref 0.9–3.6)
LYMPHOCYTES NFR BLD: 8 % (ref 21–52)
MCH RBC QN AUTO: 29.4 PG (ref 24–34)
MCHC RBC AUTO-ENTMCNC: 32.6 G/DL (ref 31–37)
MCV RBC AUTO: 90.3 FL (ref 78–100)
MONOCYTES # BLD: 1.6 K/UL (ref 0.05–1.2)
MONOCYTES NFR BLD: 9 % (ref 3–10)
NEUTS SEG # BLD: 14.7 K/UL (ref 1.8–8)
NEUTS SEG NFR BLD: 82 % (ref 40–73)
NRBC # BLD: 0.05 K/UL (ref 0–0.01)
NRBC BLD-RTO: 0.3 PER 100 WBC
PLATELET # BLD AUTO: 103 K/UL (ref 135–420)
PMV BLD AUTO: 11.1 FL (ref 9.2–11.8)
POTASSIUM SERPL-SCNC: 5.5 MMOL/L (ref 3.5–5.5)
RBC # BLD AUTO: 2.99 M/UL (ref 4.2–5.3)
SODIUM SERPL-SCNC: 143 MMOL/L (ref 136–145)
SPECIMEN EXP DATE BLD: NORMAL
UNIT DIVISION: 0
UNIT ISSUE DATE/TIME: NORMAL
WBC # BLD AUTO: 17.9 K/UL (ref 4.6–13.2)

## 2024-12-22 PROCEDURE — 99232 SBSQ HOSP IP/OBS MODERATE 35: CPT | Performed by: STUDENT IN AN ORGANIZED HEALTH CARE EDUCATION/TRAINING PROGRAM

## 2024-12-22 PROCEDURE — 2709999900 HC NON-CHARGEABLE SUPPLY: Performed by: INTERNAL MEDICINE

## 2024-12-22 PROCEDURE — 2700000000 HC OXYGEN THERAPY PER DAY

## 2024-12-22 PROCEDURE — 0D968ZZ DRAINAGE OF STOMACH, VIA NATURAL OR ARTIFICIAL OPENING ENDOSCOPIC: ICD-10-PCS | Performed by: INTERNAL MEDICINE

## 2024-12-22 PROCEDURE — 6360000002 HC RX W HCPCS: Performed by: PHYSICIAN ASSISTANT

## 2024-12-22 PROCEDURE — 2500000003 HC RX 250 WO HCPCS: Performed by: HEALTH CARE PROVIDER

## 2024-12-22 PROCEDURE — 94761 N-INVAS EAR/PLS OXIMETRY MLT: CPT

## 2024-12-22 PROCEDURE — 7100000001 HC PACU RECOVERY - ADDTL 15 MIN: Performed by: INTERNAL MEDICINE

## 2024-12-22 PROCEDURE — 6370000000 HC RX 637 (ALT 250 FOR IP): Performed by: PHYSICIAN ASSISTANT

## 2024-12-22 PROCEDURE — 7100000000 HC PACU RECOVERY - FIRST 15 MIN: Performed by: INTERNAL MEDICINE

## 2024-12-22 PROCEDURE — 1100000003 HC PRIVATE W/ TELEMETRY

## 2024-12-22 PROCEDURE — 6360000002 HC RX W HCPCS: Performed by: HEALTH CARE PROVIDER

## 2024-12-22 PROCEDURE — 6360000002 HC RX W HCPCS: Performed by: INTERNAL MEDICINE

## 2024-12-22 PROCEDURE — 3600007512: Performed by: INTERNAL MEDICINE

## 2024-12-22 PROCEDURE — 2580000003 HC RX 258: Performed by: HEALTH CARE PROVIDER

## 2024-12-22 PROCEDURE — 85018 HEMOGLOBIN: CPT

## 2024-12-22 PROCEDURE — 80048 BASIC METABOLIC PNL TOTAL CA: CPT

## 2024-12-22 PROCEDURE — 3600007502: Performed by: INTERNAL MEDICINE

## 2024-12-22 PROCEDURE — 85014 HEMATOCRIT: CPT

## 2024-12-22 PROCEDURE — 85025 COMPLETE CBC W/AUTO DIFF WBC: CPT

## 2024-12-22 PROCEDURE — 36415 COLL VENOUS BLD VENIPUNCTURE: CPT

## 2024-12-22 PROCEDURE — 3700000000 HC ANESTHESIA ATTENDED CARE: Performed by: INTERNAL MEDICINE

## 2024-12-22 PROCEDURE — 6360000002 HC RX W HCPCS: Performed by: NURSE ANESTHETIST, CERTIFIED REGISTERED

## 2024-12-22 PROCEDURE — 94640 AIRWAY INHALATION TREATMENT: CPT

## 2024-12-22 PROCEDURE — 0WJP8ZZ INSPECTION OF GASTROINTESTINAL TRACT, VIA NATURAL OR ARTIFICIAL OPENING ENDOSCOPIC APPROACH: ICD-10-PCS | Performed by: INTERNAL MEDICINE

## 2024-12-22 PROCEDURE — 3700000001 HC ADD 15 MINUTES (ANESTHESIA): Performed by: INTERNAL MEDICINE

## 2024-12-22 RX ORDER — DIPHENHYDRAMINE HYDROCHLORIDE 50 MG/ML
12.5 INJECTION INTRAMUSCULAR; INTRAVENOUS
Status: CANCELLED | OUTPATIENT
Start: 2024-12-22 | End: 2024-12-23

## 2024-12-22 RX ORDER — LIDOCAINE HYDROCHLORIDE 20 MG/ML
INJECTION, SOLUTION EPIDURAL; INFILTRATION; INTRACAUDAL; PERINEURAL
Status: DISCONTINUED | OUTPATIENT
Start: 2024-12-22 | End: 2024-12-22 | Stop reason: SDUPTHER

## 2024-12-22 RX ORDER — FENTANYL CITRATE 50 UG/ML
50 INJECTION, SOLUTION INTRAMUSCULAR; INTRAVENOUS EVERY 5 MIN PRN
Status: CANCELLED | OUTPATIENT
Start: 2024-12-22

## 2024-12-22 RX ORDER — NALOXONE HYDROCHLORIDE 0.4 MG/ML
INJECTION, SOLUTION INTRAMUSCULAR; INTRAVENOUS; SUBCUTANEOUS PRN
Status: CANCELLED | OUTPATIENT
Start: 2024-12-22

## 2024-12-22 RX ORDER — ONDANSETRON 2 MG/ML
4 INJECTION INTRAMUSCULAR; INTRAVENOUS
Status: CANCELLED | OUTPATIENT
Start: 2024-12-22 | End: 2024-12-23

## 2024-12-22 RX ORDER — METOCLOPRAMIDE HYDROCHLORIDE 5 MG/ML
10 INJECTION INTRAMUSCULAR; INTRAVENOUS EVERY 6 HOURS
Status: DISCONTINUED | OUTPATIENT
Start: 2024-12-22 | End: 2024-12-25 | Stop reason: HOSPADM

## 2024-12-22 RX ORDER — GLUCAGON 1 MG
1 KIT INJECTION PRN
Status: CANCELLED | OUTPATIENT
Start: 2024-12-22

## 2024-12-22 RX ORDER — SODIUM CHLORIDE 0.9 % (FLUSH) 0.9 %
5-40 SYRINGE (ML) INJECTION PRN
Status: CANCELLED | OUTPATIENT
Start: 2024-12-22

## 2024-12-22 RX ORDER — DEXTROSE MONOHYDRATE 100 MG/ML
INJECTION, SOLUTION INTRAVENOUS CONTINUOUS PRN
Status: CANCELLED | OUTPATIENT
Start: 2024-12-22

## 2024-12-22 RX ORDER — PROPOFOL 10 MG/ML
INJECTION, EMULSION INTRAVENOUS
Status: DISCONTINUED | OUTPATIENT
Start: 2024-12-22 | End: 2024-12-22 | Stop reason: SDUPTHER

## 2024-12-22 RX ADMIN — SODIUM CHLORIDE, PRESERVATIVE FREE 10 ML: 5 INJECTION INTRAVENOUS at 20:14

## 2024-12-22 RX ADMIN — SODIUM CHLORIDE, PRESERVATIVE FREE 10 ML: 5 INJECTION INTRAVENOUS at 08:22

## 2024-12-22 RX ADMIN — IPRATROPIUM BROMIDE 0.5 MG: 0.5 SOLUTION RESPIRATORY (INHALATION) at 20:21

## 2024-12-22 RX ADMIN — SODIUM CHLORIDE, PRESERVATIVE FREE 10 ML: 5 INJECTION INTRAVENOUS at 20:15

## 2024-12-22 RX ADMIN — SODIUM CHLORIDE 40 MG: 9 INJECTION INTRAMUSCULAR; INTRAVENOUS; SUBCUTANEOUS at 07:14

## 2024-12-22 RX ADMIN — METOCLOPRAMIDE HYDROCHLORIDE 10 MG: 5 INJECTION INTRAMUSCULAR; INTRAVENOUS at 12:20

## 2024-12-22 RX ADMIN — MIRTAZAPINE 15 MG: 15 TABLET, FILM COATED ORAL at 20:13

## 2024-12-22 RX ADMIN — IPRATROPIUM BROMIDE 0.5 MG: 0.5 SOLUTION RESPIRATORY (INHALATION) at 07:45

## 2024-12-22 RX ADMIN — METOCLOPRAMIDE HYDROCHLORIDE 10 MG: 5 INJECTION INTRAMUSCULAR; INTRAVENOUS at 23:25

## 2024-12-22 RX ADMIN — METOCLOPRAMIDE HYDROCHLORIDE 10 MG: 5 INJECTION INTRAMUSCULAR; INTRAVENOUS at 17:31

## 2024-12-22 RX ADMIN — ARFORMOTEROL TARTRATE 15 MCG: 15 SOLUTION RESPIRATORY (INHALATION) at 20:21

## 2024-12-22 RX ADMIN — SODIUM CHLORIDE 40 MG: 9 INJECTION INTRAMUSCULAR; INTRAVENOUS; SUBCUTANEOUS at 17:30

## 2024-12-22 RX ADMIN — ARFORMOTEROL TARTRATE 15 MCG: 15 SOLUTION RESPIRATORY (INHALATION) at 07:45

## 2024-12-22 RX ADMIN — BUDESONIDE 1000 MCG: 0.5 INHALANT RESPIRATORY (INHALATION) at 20:21

## 2024-12-22 RX ADMIN — IPRATROPIUM BROMIDE 0.5 MG: 0.5 SOLUTION RESPIRATORY (INHALATION) at 01:20

## 2024-12-22 RX ADMIN — LIDOCAINE HYDROCHLORIDE 100 MG: 20 SOLUTION INTRAVENOUS at 10:54

## 2024-12-22 RX ADMIN — PROPOFOL 130 MG: 10 INJECTION, EMULSION INTRAVENOUS at 10:54

## 2024-12-22 RX ADMIN — IPRATROPIUM BROMIDE 0.5 MG: 0.5 SOLUTION RESPIRATORY (INHALATION) at 14:36

## 2024-12-22 RX ADMIN — BUDESONIDE 1000 MCG: 0.5 INHALANT RESPIRATORY (INHALATION) at 07:46

## 2024-12-22 ASSESSMENT — PAIN SCALES - GENERAL
PAINLEVEL_OUTOF10: 0

## 2024-12-22 ASSESSMENT — PAIN - FUNCTIONAL ASSESSMENT: PAIN_FUNCTIONAL_ASSESSMENT: NONE - DENIES PAIN

## 2024-12-22 NOTE — PROGRESS NOTES
[facility] Hospitalist Group  Progress Note    Patient: Annamarie Velásquez Age: 79 y.o. : 1945 MR#: [id number] SSN: [id number]  Date/Time: 2024     Chief Complaint   Patient presents with    Abdominal Pain       Subjective:   Patient seen and examined. In good spirits.  Overnight events reviewed,.  Patient has no recollection of what happened, she denies jaw pain,  no further bowel movements..denies chest pain, abdominal pain, shortness of breath, nausea or emesis.  Discussed plan for EGD later today.   Assessment/Plan:   Acute on chronic anemia: underlying alpha thalassemia  Suspected upper GIB: hx NSAID use  GERD  - IV PPI BID. Monitor Hgb, transfuse to keep>7. S/p 2u pRBC. Follow H/H.   -GI input appreciated.  Status post EGD with UGI hemorrhage with large clot burden in gastric body/fundus.  Repeat EGD 2324 per GI, n.p.o.  at midnight  - avoid NSAIDs or chemical DVT ppx, place SCDs.     Lactic acidosis, resolved  -Normal mesenteric duplex. Cont IVF.   - IV fluids     Syncope and collapse  - echo results reviewed., orthostats pending, IVF, Tele  RA  - cont hydroxychloroquine     Anxiety  Depression  - cont sertraline     COPD, not in active exacerbation.  - cont trelegy or formulary equivalent, prn Duonebs.      Dispo plan:  anticipated discharge date 24 pending EGD results continued medical stability, PT/OT dragan VERDUZCO spent 35 minutes with the patient in face-to-face consultation, of which greater than 50% was spent in counseling and coordination of care as described above.    Family Communication/Updates:  [x]  Patient is clinically able to contact and update Family  []  Patient is NOT clinically able to contact and update Family; Patient requires Clinician to contact and update Family     Case discussed with:  [x]Patient  []Family  [x]Nursing  []Case Management  DVT Prophylaxis:  []Lovenox  []Hep SQ  [x]SCDs  []Coumadin   []Eliquis/Xarelto     Objective:   VS: BP

## 2024-12-22 NOTE — PROGRESS NOTES
[email or web address]  [phone]    GASTROENTEROLOGY BRIEF NOTE     Impression:  1. Acute on chronic anemia   - h/o alpha thalaseemia. Hgb baseline 11.9, 8.8 in ED, now 6.2. black stools for 2 days PTA, some intermittent generalized abd pain for last few weeks. CT w/ significant fecal retention. Not using anticoagulation, etoh, NSAIDs, iron. 12/21 VSS.   - Colonoscopy 2021 w/ Dr. Mccabe w/ hemorrhoids, sigmoid diverticulosis, toruous colon, unspecified polyp w/ 7yr recall   2. Syncope  3. COPD  4. RA    NM scan attempted evening 12/21 without active bleeding but was aborted early as patient had jaw as well as abdominal pain with unrevealing repeat EKG. Patient later had bloody BM after returning to her room with repeat hgb of 5.9.    Plan:  1. Overnight events discussed with Dr. Hebert. Tentatively plan on EGD today, w/ start time of 1030am per OR schedule. Continue NPO status. Procedure posted to board. Updated unit RN and Perfect Serve sent to hospitalist.         Luis Platt PA-C.   08/26/23, 8:10 AM   [facility]  [email or web address]  Phone: [phone]

## 2024-12-22 NOTE — PLAN OF CARE
Problem: Pain  Goal: Verbalizes/displays adequate comfort level or baseline comfort level  Outcome: Progressing  Oxygen saturations holding steady, perfusing body well. Patient reports good pain control upon re-assessment and return of appetite, following procedures     Problem: Discharge Planning  Goal: Discharge to home or other facility with appropriate resources  Outcome: Progressing  Problem: ABCDS Injury Assessment  Goal: Absence of physical injury  Outcome: Progressing     Problem: Safety - Adult  Goal: Free from fall injury  Outcome: Progressing   Patient educated on getting up only when she has somebody with her, no falls this hospital stay.

## 2024-12-22 NOTE — PERIOP NOTE
TRANSFER - OUT REPORT:    Telephone report given to Salena raymundo Velásquez  being transferred to [facility] for routine post-op       Report consisted of patient's Situation, Background, Assessment and   Recommendations(SBAR).     Information from the following report(s) Surgery Report was reviewed with the receiving nurse.           Lines:   Peripheral IV 12/20/24 Left External Jugular (Active)   Site Assessment Clean, dry & intact 12/22/24 1103   Line Status Infusing 12/22/24 1103   Line Care Connections checked and tightened 12/22/24 1103   Phlebitis Assessment No symptoms 12/22/24 1103   Infiltration Assessment 0 12/21/24 2005   Alcohol Cap Used Yes 12/22/24 1103   Dressing Status Clean, dry & intact 12/22/24 1103   Dressing Type Transparent 12/22/24 1103        Opportunity for questions and clarification was provided.      Patient transported with:  O2 @ 5lpm and Tech     Jeison

## 2024-12-22 NOTE — PLAN OF CARE
Problem: Pain  Goal: Verbalizes/displays adequate comfort level or baseline comfort level  12/22/2024 0012 by Cheyenne Sequeira, RN  Outcome: Progressing  Flowsheets (Taken 12/22/2024 0008)  Verbalizes/displays adequate comfort level or baseline comfort level:   Encourage patient to monitor pain and request assistance   Assess pain using appropriate pain scale   Consider cultural and social influences on pain and pain management     Problem: ABCDS Injury Assessment  Goal: Absence of physical injury  12/22/2024 0012 by Cheyenne Sequeira, RN  Outcome: Progressing  Flowsheets (Taken 12/22/2024 0008)  Absence of Physical Injury: Implement safety measures based on patient assessment     Problem: Safety - Adult  Goal: Free from fall injury  12/22/2024 0012 by Cheyenne Sequeira, RN  Outcome: Progressing

## 2024-12-22 NOTE — PROCEDURES
PROCEDURE NOTE  Date: 12/22/2024   Name: Annamarie Velásquez  YOB: 1945                Brief Procedure Note    Annamarie Velásquez  1945  [id number]    Date of Procedure: 12/22/2024    Preoperative diagnosis: Gastrointestinal hemorrhage, unspecified gastrointestinal hemorrhage type [K92.2]    Postoperative diagnosis: 1. Recent hemorrhage likely from gastric body or fundus, obscured with large clot burden    Description of Findings: same    Sedation/Anesthesia: Monitored Anesthesia Care; See Anesthesia Note    Procedure: Procedure(s):  ESOPHAGOGASTRODUODENOSCOPY    :  Dr. Salena Hebert MD    Assistant(s): Circulator: Clark Ferguson, DARSHAN; Socorro Perez, DARSHAN; Yvoana Lara RN    EBL:None    Specimens: * No specimens in log *    Findings: 1. Recent UGI hemorrhage with large clot burden localized to gastric body/fundus. Cleared antrum and duodenum with aggressive lavage and suction.     Complications: None    Recommendations: 1. BID PPI  2. Erythomycin or reglan today in effort to clear clot burden for visualization  3. Repeat EGD tomorrow 12/23/24  4. Continue to monitor H/H and transfuse as indicated    Tissue Implant Device: None    Dr. Salena Hebert MD  12/22/2024  11:01 AM    Salena Hebert MD  [facility]  Office/pager - [phone]  Cell - [phone]  [email or web address]

## 2024-12-22 NOTE — PROGRESS NOTES
Occupational Therapy  OT orders received and chart reviewed. Pt not seen for skilled OT due to:    []  Nausea/vomiting  []  Eating  []  Pain  []  Pt lethargic  [x]  Off Unit to endo @ 10:30am, 11:24am    Will f/u later as schedule allows. Thank you.  Gabrielle Davis OTD, OTR/L

## 2024-12-22 NOTE — SIGNIFICANT EVENT
[facility]  Rapid/Medical Response Team Note      Patient:    Annamarie Velásquez 79 y.o. female, : 1945  Patient MRN: [id number]    Admission Date: 2024   Admission Diagnosis: Upper GI bleed [K92.2]  Pain of upper abdomen [R10.10]  Gastrointestinal hemorrhage, unspecified gastrointestinal hemorrhage type [K92.2]      RAPID RESPONSE  Rapid response called for: Altered mental status    Nurse reported that patient has been altered and moaning since returning from her tagged RBC scan, which had to be stopped early due to pain. Patient also had a significant bloody BM after returning to her room. Patient reports pain in her jaw diffusely and abdominal pain which she is unable to localize. Her fingerstick glucose on arrival was in the 60s and she was given x1 dose of glucagon. Repeat glucose was 104. She had an EKG to assess her jaw pain which was unchanged from previous EKG. Her H&H was 5.9 and she was started on 1u pRBC with another ordered on standby. Troponin ordered and pending at the end of the rapid. GI called and made aware, they will see patient. Patient resting comfortably in bed at conclusion of rapid.       OBJECTIVE    RRT/MRT start time:               RRT/MRT end time:   Vitals:    24   BP: 100/82   Pulse: 82   Resp: 20   Temp: 97.7 °F (36.5 °C)   SpO2: 99%        Physical Exam:  Gen: Mild distress  HEENT: normocephalic, atraumatic, no thyromegaly, no JVD  CV: RRR, S1/S2 present without M/R/G, +2 radial pulses, PMI not displaced  Pulm: CTAB, no wheezes, no crackles  Abd: S/NT/ND, no rebound, no guarding, no hepatosplenomegaly   MSK: no clubbing, no edema  Skin: warm, dry, intact, no rash  Neuro: no focal deficits appreciated   Psych: Answering questions appropriately. Appears uncomfortable      ASSESSMENT/PLAN AND DISPOSITION  Annamarie Velásquez is a 79 y.o. year old female admitted for Upper GI bleed [K92.2]  Pain of upper abdomen [R10.10]  Gastrointestinal  hemorrhage, unspecified gastrointestinal hemorrhage type [K92.2]  Rapid Response Team/ Medical Response Team Called For: AMS     In setting of Upper GI Bleed completed the below:    Medications Administered During Rapid:  Glucagon, 1u pRBC    EKG: Unchanged from previous with T wave inversions in anterior leads    Labs: POC glucose, Troponin, H&H    Imaging: None    Other interventions: 1u pRBC with another on standby      Medical Problem(s)    Plan:  - GI contacted, will assess patient  - 1u pRBC ordered and one on standby to be given if post-transfusion H&H is < 7.0  - EKG unchanged from previous. Troponin pending    Disposition: Stay on Stepdown  Patient condition: gaurded      Attending Dr. Valiente notified of rapid response and is in agreement with plan.     Primary team resuming care.     [facility] Senior resident Dr. Banerjee present during Rapid Response.        Marguerite Perez DO - PGY-1  [facility]  December 21, 2024 9:10 PM

## 2024-12-23 ENCOUNTER — ANESTHESIA EVENT (OUTPATIENT)
Facility: HOSPITAL | Age: 79
DRG: 327 | End: 2024-12-23
Payer: MEDICARE

## 2024-12-23 LAB
ANION GAP SERPL CALC-SCNC: 5 MMOL/L (ref 3–18)
BASOPHILS # BLD: 0.1 K/UL (ref 0–0.1)
BASOPHILS NFR BLD: 0 % (ref 0–2)
BUN SERPL-MCNC: 25 MG/DL (ref 7–18)
BUN/CREAT SERPL: 41 (ref 12–20)
CALCIUM SERPL-MCNC: 7.5 MG/DL (ref 8.5–10.1)
CHLORIDE SERPL-SCNC: 118 MMOL/L (ref 100–111)
CO2 SERPL-SCNC: 20 MMOL/L (ref 21–32)
CREAT SERPL-MCNC: 0.61 MG/DL (ref 0.6–1.3)
DIFFERENTIAL METHOD BLD: ABNORMAL
EOSINOPHIL # BLD: 0.4 K/UL (ref 0–0.4)
EOSINOPHIL NFR BLD: 3 % (ref 0–5)
ERYTHROCYTE [DISTWIDTH] IN BLOOD BY AUTOMATED COUNT: 16.6 % (ref 11.6–14.5)
GLUCOSE BLD STRIP.AUTO-MCNC: 97 MG/DL (ref 70–110)
GLUCOSE SERPL-MCNC: 96 MG/DL (ref 74–99)
HCT VFR BLD AUTO: 24.4 % (ref 35–45)
HCT VFR BLD AUTO: 24.5 % (ref 35–45)
HCT VFR BLD AUTO: 24.6 % (ref 35–45)
HGB BLD-MCNC: 7.9 G/DL (ref 12–16)
IMM GRANULOCYTES # BLD AUTO: 0.1 K/UL (ref 0–0.04)
IMM GRANULOCYTES NFR BLD AUTO: 1 % (ref 0–0.5)
LYMPHOCYTES # BLD: 1.4 K/UL (ref 0.9–3.6)
LYMPHOCYTES NFR BLD: 10 % (ref 21–52)
MCH RBC QN AUTO: 29.7 PG (ref 24–34)
MCHC RBC AUTO-ENTMCNC: 32.2 G/DL (ref 31–37)
MCV RBC AUTO: 92.1 FL (ref 78–100)
MONOCYTES # BLD: 1.3 K/UL (ref 0.05–1.2)
MONOCYTES NFR BLD: 9 % (ref 3–10)
NEUTS SEG # BLD: 10.2 K/UL (ref 1.8–8)
NEUTS SEG NFR BLD: 76 % (ref 40–73)
NRBC # BLD: 0.16 K/UL (ref 0–0.01)
NRBC BLD-RTO: 1.2 PER 100 WBC
PLATELET # BLD AUTO: 108 K/UL (ref 135–420)
PMV BLD AUTO: 11.5 FL (ref 9.2–11.8)
POTASSIUM SERPL-SCNC: 4.5 MMOL/L (ref 3.5–5.5)
RBC # BLD AUTO: 2.66 M/UL (ref 4.2–5.3)
SODIUM SERPL-SCNC: 143 MMOL/L (ref 136–145)
WBC # BLD AUTO: 13.3 K/UL (ref 4.6–13.2)

## 2024-12-23 PROCEDURE — 85018 HEMOGLOBIN: CPT

## 2024-12-23 PROCEDURE — 94761 N-INVAS EAR/PLS OXIMETRY MLT: CPT

## 2024-12-23 PROCEDURE — 97162 PT EVAL MOD COMPLEX 30 MIN: CPT

## 2024-12-23 PROCEDURE — 6360000002 HC RX W HCPCS: Performed by: HEALTH CARE PROVIDER

## 2024-12-23 PROCEDURE — 82962 GLUCOSE BLOOD TEST: CPT

## 2024-12-23 PROCEDURE — 6370000000 HC RX 637 (ALT 250 FOR IP): Performed by: PHYSICIAN ASSISTANT

## 2024-12-23 PROCEDURE — 94640 AIRWAY INHALATION TREATMENT: CPT

## 2024-12-23 PROCEDURE — 6360000002 HC RX W HCPCS: Performed by: PHYSICIAN ASSISTANT

## 2024-12-23 PROCEDURE — 6360000002 HC RX W HCPCS: Performed by: INTERNAL MEDICINE

## 2024-12-23 PROCEDURE — 97535 SELF CARE MNGMENT TRAINING: CPT

## 2024-12-23 PROCEDURE — 2700000000 HC OXYGEN THERAPY PER DAY

## 2024-12-23 PROCEDURE — 36415 COLL VENOUS BLD VENIPUNCTURE: CPT

## 2024-12-23 PROCEDURE — 2580000003 HC RX 258: Performed by: HEALTH CARE PROVIDER

## 2024-12-23 PROCEDURE — 1100000003 HC PRIVATE W/ TELEMETRY

## 2024-12-23 PROCEDURE — 85025 COMPLETE CBC W/AUTO DIFF WBC: CPT

## 2024-12-23 PROCEDURE — 97166 OT EVAL MOD COMPLEX 45 MIN: CPT

## 2024-12-23 PROCEDURE — 85014 HEMATOCRIT: CPT

## 2024-12-23 PROCEDURE — 99232 SBSQ HOSP IP/OBS MODERATE 35: CPT | Performed by: STUDENT IN AN ORGANIZED HEALTH CARE EDUCATION/TRAINING PROGRAM

## 2024-12-23 PROCEDURE — 80048 BASIC METABOLIC PNL TOTAL CA: CPT

## 2024-12-23 PROCEDURE — 2500000003 HC RX 250 WO HCPCS: Performed by: HEALTH CARE PROVIDER

## 2024-12-23 RX ADMIN — IPRATROPIUM BROMIDE 0.5 MG: 0.5 SOLUTION RESPIRATORY (INHALATION) at 19:31

## 2024-12-23 RX ADMIN — BUDESONIDE 1000 MCG: 0.5 INHALANT RESPIRATORY (INHALATION) at 19:31

## 2024-12-23 RX ADMIN — SODIUM CHLORIDE, PRESERVATIVE FREE 10 ML: 5 INJECTION INTRAVENOUS at 09:15

## 2024-12-23 RX ADMIN — METOCLOPRAMIDE HYDROCHLORIDE 10 MG: 5 INJECTION INTRAMUSCULAR; INTRAVENOUS at 05:38

## 2024-12-23 RX ADMIN — ARFORMOTEROL TARTRATE 15 MCG: 15 SOLUTION RESPIRATORY (INHALATION) at 19:31

## 2024-12-23 RX ADMIN — IPRATROPIUM BROMIDE 0.5 MG: 0.5 SOLUTION RESPIRATORY (INHALATION) at 08:45

## 2024-12-23 RX ADMIN — METOCLOPRAMIDE HYDROCHLORIDE 10 MG: 5 INJECTION INTRAMUSCULAR; INTRAVENOUS at 12:17

## 2024-12-23 RX ADMIN — SODIUM CHLORIDE, PRESERVATIVE FREE 10 ML: 5 INJECTION INTRAVENOUS at 20:16

## 2024-12-23 RX ADMIN — IPRATROPIUM BROMIDE 0.5 MG: 0.5 SOLUTION RESPIRATORY (INHALATION) at 02:18

## 2024-12-23 RX ADMIN — SODIUM CHLORIDE 40 MG: 9 INJECTION INTRAMUSCULAR; INTRAVENOUS; SUBCUTANEOUS at 05:39

## 2024-12-23 RX ADMIN — MIRTAZAPINE 15 MG: 15 TABLET, FILM COATED ORAL at 20:16

## 2024-12-23 RX ADMIN — ARFORMOTEROL TARTRATE 15 MCG: 15 SOLUTION RESPIRATORY (INHALATION) at 08:45

## 2024-12-23 RX ADMIN — SODIUM CHLORIDE 40 MG: 9 INJECTION INTRAMUSCULAR; INTRAVENOUS; SUBCUTANEOUS at 17:53

## 2024-12-23 RX ADMIN — IPRATROPIUM BROMIDE 0.5 MG: 0.5 SOLUTION RESPIRATORY (INHALATION) at 16:06

## 2024-12-23 RX ADMIN — METOCLOPRAMIDE HYDROCHLORIDE 10 MG: 5 INJECTION INTRAMUSCULAR; INTRAVENOUS at 17:53

## 2024-12-23 RX ADMIN — BUDESONIDE 1000 MCG: 0.5 INHALANT RESPIRATORY (INHALATION) at 08:45

## 2024-12-23 ASSESSMENT — PAIN SCALES - GENERAL
PAINLEVEL_OUTOF10: 0

## 2024-12-23 NOTE — PROGRESS NOTES
4 Eyes Skin Assessment     NAME:  Annamarie Velásquez  YOB: 1945  MEDICAL RECORD NUMBER:  [id number]    The patient is being assessed for  Shift Handoff    I agree that at least one RN has performed a thorough Head to Toe Skin Assessment on the patient. ALL assessment sites listed below have been assessed.      Areas assessed by both nurses:    Head, Face, Ears, Shoulders, Back, Chest, Arms, Elbows, Hands, Sacrum. Buttock, Coccyx, Ischium, Legs. Feet and Heels, and Under Medical Devices         Does the Patient have a Wound? No noted wound(s)       Jamaal Prevention initiated by RN: Yes  Wound Care Orders initiated by RN: No    Pressure Injury (Stage 3,4, Unstageable, DTI, NWPT, and Complex wounds) if present, place Wound referral order by RN under : No    New Ostomies, if present place, Ostomy referral order under : No     Nurse 1 eSignature: Electronically signed by KRAIG FINNEGAN RN on 12/23/24 at 7:00 AM EST    **SHARE this note so that the co-signing nurse can place an eSignature**    Nurse 2 eSignature: Electronically signed by Karina Perez RN on 12/23/24 at 7:55 AM EST

## 2024-12-23 NOTE — PROGRESS NOTES
PHYSICAL THERAPY EVALUATION/DISCHARGE    Patient: Annamarie Velásquez (79 y.o. female)  Date: 12/23/2024  Primary Diagnosis: Upper GI bleed [K92.2]  Pain of upper abdomen [R10.10]  Gastrointestinal hemorrhage, unspecified gastrointestinal hemorrhage type [K92.2]  Procedure(s) (LRB):  ESOPHAGOGASTRODUODENOSCOPY (N/A) 1 Day Post-Op   Precautions: None  PLOF: Independent. Has cane and ww as needed.   ASSESSMENT AND RECOMMENDATIONS:  Mod I for supine to sit. Good seated balance. Supervision for amb to/from bathroom with steady gait. Good safety awareness with transfers and balance tasks in bathroom. Returned to seated in bed. Denies mobility concerns with return home. Educated on need for RN assistance with mobility d/t lines; verbalized understanding. Call bell in reach.     Patient does not require further skilled physical therapy intervention at this level of care.    Further Equipment Recommendations for Discharge: straight cane and rolling walker; has as needed    AM-PAC Inpatient Mobility Raw Score : 22    This [facility] score should be considered in conjunction with interdisciplinary team recommendations to determine the most appropriate discharge setting. Patient's social support, diagnosis, medical stability, and prior level of function should also be taken into consideration.    Current research shows that an AM-PAC score of 18 (14 without stairs) or greater is associated with a discharge to the patient's home setting.     SUBJECTIVE:   Patient stated “I need to use the bathroom.”    OBJECTIVE DATA SUMMARY:     Past Medical History:   Diagnosis Date    Anxiety and depression     anxiety, depression    Asthma     Chronic obstructive pulmonary disease (HCC)     Coagulation disorder (HCC)     alpha thalassemia     GERD (gastroesophageal reflux disease)     Hypertension     4-2015- no meds    Ill-defined condition     seasonal allergies    RA (rheumatoid arthritis) (HCC)     Rheumatoid arthritis (HCC) 12/20/2024

## 2024-12-23 NOTE — PLAN OF CARE
Problem: Pain  Goal: Verbalizes/displays adequate comfort level or baseline comfort level  12/23/2024 0553 by Lia Downing, RN  Outcome: Progressing  Flowsheets  Taken 12/23/2024 0553  Verbalizes/displays adequate comfort level or baseline comfort level: Assess pain using appropriate pain scale  Taken 12/23/2024 0324  Verbalizes/displays adequate comfort level or baseline comfort level: Assess pain using appropriate pain scale  Taken 12/22/2024 1926  Verbalizes/displays adequate comfort level or baseline comfort level: Assess pain using appropriate pain scale  Note: Patient will be assessed using verbal 1-10 pain scale. Patient denies any pain but will continue to be reassessed frequently.    Problem: Safety - Adult  Goal: Free from fall injury  12/23/2024 0553 by Lia Downing RN  Outcome: Progressing  Flowsheets (Taken 12/23/2024 0553)  Free From Fall Injury: Instruct family/caregiver on patient safety  Note: Patient will remain free of falls or injuries during length of stay. Safety interventions implemented: non-skid socks, call light within reach, and bed alarm on.      Problem: Skin/Tissue Integrity  Goal: Absence of new skin breakdown  Description: 1.  Monitor for areas of redness and/or skin breakdown  2.  Assess vascular access sites hourly  3.  Every 4-6 hours minimum:  Change oxygen saturation probe site  4.  Every 4-6 hours:  If on nasal continuous positive airway pressure, respiratory therapy assess nares and determine need for appliance change or resting period.  Outcome: Progressing  Note: Patient will remain free of any new skin breakdown during length of stay. Q2 hour turning implemented and body mobility education given.

## 2024-12-23 NOTE — PROGRESS NOTES
Spiritual Health History and Assessment/Progress Note  [facility]    (P) Initial Encounter,  ,  ,      Name: Annamarie Velásquez MRN: [id number]    Age: 79 y.o.     Sex: female   Language: English   Mandaen: Non-Confucianism   Upper GI bleed     Date: 12/23/2024            Total Time Calculated: (P) 7 min              Spiritual Assessment began in [address]        Referral/Consult From: (P) Rounding   Encounter Overview/Reason: (P) Initial Encounter  Service Provided For: (P) Patient    Rhonda, Belief, Meaning:   Patient identifies as spiritual and has beliefs or practices that help with coping during difficult times  Family/Friends No family/friends present      Importance and Influence:  Patient has spiritual/personal beliefs that influence decisions regarding their health  Family/Friends No family/friends present    Community:  Patient feels well-supported. Support system includes: Children  Family/Friends No family/friends present    Assessment and Plan of Care:     Patient Interventions include: Facilitated expression of thoughts and feelings and Provided sacramental/Jainism ritual  Family/Friends Interventions include: No family/friends present    Patient Plan of Care: Spiritual Care available upon further referral  Family/Friends Plan of Care: No family/friends present    Electronically signed by Chaplain Mona on 12/23/2024 at 9:14 AM

## 2024-12-23 NOTE — CARE COORDINATION
When medically cleared dispo. Home with no needs, daughter to transport.      Flora Mcaky, MSW     [phone]

## 2024-12-23 NOTE — PROGRESS NOTES
[phone]    Gastroenterology follow up-Progress note    Impression:  1. Acute on chronic anemia   - h/o alpha thalaseemia. Hgb baseline 11.9, 8.8 in ED, declined to  6.2 12/22. black stools for 2 days PTA, some intermittent generalized abd pain for last few weeks. CT w/ significant fecal retention. Not using anticoagulation, etoh, NSAIDs, iron. 12/21 VSS.   - Colonoscopy 2021 w/ Dr. Mccabe w/ hemorrhoids, sigmoid diverticulosis, toruous colon, unspecified polyp w/ 7yr recall   2. Syncope  3. COPD  4. RA    NM scan attempted evening 12/21 without active bleeding but was aborted early as patient had jaw as well as abdominal pain with unrevealing repeat EKG. Patient later had bloody BM after returning to her room with repeat hgb of 5.9.    12/22/24 EGD: Recent UGI hemorrhage with large clot  urden localized to gastric body/fundus. Cleared antrum and  duodenum with aggressive lavage and suction.    12/23/24: No BM today, no other signs of GIB today. Hgb stable 7.9    Plan:  1. Unable to complete EGD today as hoped d/t scheduling, discussed with pt   2. EGD tomorrow 12/24/24  3. Clear liquids today   4. NPO midnight  5. Monitor H/H, transfuse if Hgb <7.  6. IV PPI BID  7. Continue medical management per primary team.    Chief Complaint: GIB    Subjective:  Pt seen resting in bed, no overnight events, no distress, denies ABD pain and has not had a BM today, confirmed by bedside nurse.    ROS: Denies any fevers, chills, rash.       Patient Active Problem List   Diagnosis    Status post revision of total knee replacement, left    Upper GI bleed    Chronic obstructive lung disease (HCC)    Gastroesophageal reflux disease    TB lung, latent    Constipation    Rheumatoid arthritis (HCC)         /60   Pulse 82   Temp 98.3 °F (36.8 °C) (Oral)   Resp 16   Ht 1.676 m (5' 6\")   Wt 54.5 kg (120 lb 2.4 oz)   SpO2 98%   BMI 19.39 kg/m²     Skin: warm and dry, no rash or erythema  Head: normocephalic and

## 2024-12-23 NOTE — PROGRESS NOTES
OCCUPATIONAL THERAPY EVALUATION/DISCHARGE    Patient: Annamarie Velásquez (79 y.o. female)  Date: 12/23/2024  Primary Diagnosis: Upper GI bleed [K92.2]  Pain of upper abdomen [R10.10]  Gastrointestinal hemorrhage, unspecified gastrointestinal hemorrhage type [K92.2]  Procedure(s) (LRB):  ESOPHAGOGASTRODUODENOSCOPY (N/A) 1 Day Post-Op   Precautions: Fall Risk, General Precautions  PLOF: Pt was independent with self-care and functional mobility.     ASSESSMENT AND RECOMMENDATIONS:  Based on the objective data described below, pt presents with no deficits that impede pt function with ADLs, functional transfers, and functional mobility in preparation for selfcare tasks. At this time pt is safe to d/c home with family support from selfcare standpoint. Pt left all needs met and call bell in reach.      Maximum therapeutic gains met at current level of care and patient will be discharged from occupational therapy at this time.    Further Equipment Recommendations for Discharge: Patient has all needed DME for home safety.    AMPAC: AM-PAC Inpatient Daily Activity Raw Score: 24    At this time and based on an AM-PAC score, no further OT is recommended upon discharge.  Recommend patient returns to prior setting with prior services.    This [facility] score should be considered in conjunction with interdisciplinary team recommendations to determine the most appropriate discharge setting. Patient's social support, diagnosis, medical stability, and prior level of function should also be taken into consideration.     SUBJECTIVE:   Patient stated “Mesha been wearing SCD pumps at home on my legs since my knee surgeries”    OBJECTIVE DATA SUMMARY:     Past Medical History:   Diagnosis Date    Anxiety and depression     anxiety, depression    Asthma     Chronic obstructive pulmonary disease (HCC)     Coagulation disorder (HCC)     alpha thalassemia     GERD (gastroesophageal reflux disease)     Hypertension     4-2015- no meds    Ill-defined

## 2024-12-23 NOTE — PLAN OF CARE
Problem: Pain  Goal: Verbalizes/displays adequate comfort level or baseline comfort level  12/23/2024 0805 by Karina Perez, RN  Outcome: Progressing  Flowsheets (Taken 12/23/2024 0802)  Verbalizes/displays adequate comfort level or baseline comfort level:   Assess pain using appropriate pain scale   Encourage patient to monitor pain and request assistance   Administer analgesics based on type and severity of pain and evaluate response   Implement non-pharmacological measures as appropriate and evaluate response  Note: Patient will be assessed using verbal 1-10 pain scale. Patient denies any pain but will continue to be reassessed frequently.     Problem: Safety - Adult  Goal: Free from fall injury  12/23/2024 0805 by Karina Perez RN  Outcome: Progressing  Note: Patient will remain free of falls or injuries during length of stay. Safety interventions implemented: non-skid socks, call light within reach, and bed alarm on.     Problem: Skin/Tissue Integrity  Goal: Absence of new skin breakdown  Description: 1.  Monitor for areas of redness and/or skin breakdown  2.  Assess vascular access sites hourly  3.  Every 4-6 hours minimum:  Change oxygen saturation probe site  4.  Every 4-6 hours:  If on nasal continuous positive airway pressure, respiratory therapy assess nares and determine need for appliance change or resting period.  12/23/2024 0805 by Karina Perez, RN  Outcome: Progressing  Note: Patient will remain free of any new skin breakdown during length of stay. Turning will be implemented every two hours and body mobility education given.

## 2024-12-23 NOTE — PROGRESS NOTES
[facility] Hospitalist Group  Progress Note    Patient: Annamarie Velásquez Age: 79 y.o. : 1945 MR#: [id number] SSN: [id number]  Date/Time: 2024     Chief Complaint   Patient presents with    Abdominal Pain       Subjective:   Patient seen and examined. In good spirits.  No acute events overnight. no further bowel movements..denies chest pain, abdominal pain, shortness of breath, nausea or emesis.  Discussed plan for repeat EGD later today.  No further units PRBC  Assessment/Plan:   Acute on chronic anemia: underlying alpha thalassemia  Suspected upper GIB: hx NSAID use  GERD  - IV PPI BID, IV reglan. Monitor Hgb, transfuse to keep>7. S/p 2u pRBC. Follow H/H.   -GI input appreciated.  Status post EGD with UGI hemorrhage with large clot burden in gastric body/fundus.  Repeat EGD 24 per GI, NPO, clear liquid afterwards.   - avoid NSAIDs or chemical DVT ppx, place SCDs.     Lactic acidosis, resolved  -Normal mesenteric duplex. Cont IVF.   - IV fluids     Syncope and collapse  - echo results reviewed., orthostats pending, IVF, Tele  RA  - cont hydroxychloroquine     Anxiety  Depression  - cont sertraline, remeron     COPD, not in active exacerbation.  - cont trelegy or formulary equivalent, prn Duonebs.    Dispo plan: anticipated discharge date 24 pending EGD results continued medical stability,     I spent 35 minutes with the patient in face-to-face consultation, of which greater than 50% was spent in counseling and coordination of care as described above.    Family Communication/Updates:  [x]  Patient is clinically able to contact and update Family  []  Patient is NOT clinically able to contact and update Family; Patient requires Clinician to contact and update Family     Case discussed with:  [x]Patient  []Family  [x]Nursing  [x]Case Management  DVT Prophylaxis:  []Lovenox  []Hep SQ  [x]SCDs  []Coumadin   []Eliquis/Xarelto     Objective:   VS: /60   Pulse 68    Temp 98.3 °F (36.8 °C) (Oral)   Resp 16   Ht 1.676 m (5' 6\")   Wt 54.5 kg (120 lb 2.4 oz)   SpO2 98%   BMI 19.39 kg/m²    Tmax/24hrs: Temp (24hrs), Av.4 °F (36.9 °C), Min:97.7 °F (36.5 °C), Max:99 °F (37.2 °C)  IOBRIEF  Intake/Output Summary (Last 24 hours) at 2024 1407  Last data filed at 2024 0300  Gross per 24 hour   Intake 520 ml   Output 400 ml   Net 120 ml     General:  Alert, cooperative, no acute distress, in good spirits.  HEENT: PERRLA, anicteric sclerae.  Pulmonary:  CTA Bilaterally. No Wheezing/Rales.  Cardiovascular: Regular rate and Rhythm.  GI:  Soft, Non distended, Non tender. + Bowel sounds.  Extremities:  No edema. No calf tenderness.   Psych: Good insight. Not anxious or agitated.  Neurologic: Alert and oriented X 3. Moves all ext.  Medications:   Current Facility-Administered Medications   Medication Dose Route Frequency    metoclopramide (REGLAN) injection 10 mg  10 mg IntraVENous Q6H    acetaminophen (TYLENOL) tablet 650 mg  650 mg Oral Q6H PRN    Or    acetaminophen (TYLENOL) suppository 650 mg  650 mg Rectal Q6H PRN    perflutren lipid microspheres (DEFINITY) injection 2 mL  2 mL IntraVENous ONCE PRN    sodium chloride flush 0.9 % injection 5-40 mL  5-40 mL IntraVENous 2 times per day    sodium chloride flush 0.9 % injection 5-40 mL  5-40 mL IntraVENous PRN    0.9 % sodium chloride infusion   IntraVENous PRN    ondansetron (ZOFRAN-ODT) disintegrating tablet 4 mg  4 mg Oral Q8H PRN    Or    ondansetron (ZOFRAN) injection 4 mg  4 mg IntraVENous Q6H PRN    pantoprazole (PROTONIX) 40 mg in sodium chloride (PF) 0.9 % 10 mL injection  40 mg IntraVENous Q12H    budesonide (PULMICORT) nebulizer suspension 1,000 mcg  1 mg Nebulization BID RT    And    arformoterol tartrate (BROVANA) nebulizer solution 15 mcg  15 mcg Nebulization BID RT    And    ipratropium (ATROVENT) 0.02 % nebulizer solution 0.5 mg  0.5 mg Nebulization Q6H RT    sertraline (ZOLOFT) tablet 100 mg  100 mg Oral  Daily    mirtazapine (REMERON) tablet 15 mg  15 mg Oral Nightly    hydroxychloroquine (PLAQUENIL) tablet 200 mg  200 mg Oral Daily    ipratropium 0.5 mg-albuterol 2.5 mg (DUONEB) nebulizer solution 1 Dose  1 Dose Inhalation Q4H PRN       Labs:    Recent Results (from the past 24 hour(s))   Hemoglobin and Hematocrit    Collection Time: 12/22/24  4:04 PM   Result Value Ref Range    Hemoglobin 8.6 (L) 12.0 - 16.0 g/dL    Hematocrit 26.0 (L) 35.0 - 45.0 %   CBC with Auto Differential    Collection Time: 12/23/24  3:26 AM   Result Value Ref Range    WBC 13.3 (H) 4.6 - 13.2 K/uL    RBC 2.66 (L) 4.20 - 5.30 M/uL    Hemoglobin 7.9 (L) 12.0 - 16.0 g/dL    Hematocrit 24.5 (L) 35.0 - 45.0 %    MCV 92.1 78.0 - 100.0 FL    MCH 29.7 24.0 - 34.0 PG    MCHC 32.2 31.0 - 37.0 g/dL    RDW 16.6 (H) 11.6 - 14.5 %    Platelets 108 (L) 135 - 420 K/uL    MPV 11.5 9.2 - 11.8 FL    Nucleated RBCs 1.2 (H) 0  WBC    nRBC 0.16 (H) 0.00 - 0.01 K/uL    Neutrophils % 76 (H) 40 - 73 %    Lymphocytes % 10 (L) 21 - 52 %    Monocytes % 9 3 - 10 %    Eosinophils % 3 0 - 5 %    Basophils % 0 0 - 2 %    Immature Granulocytes % 1 (H) 0.0 - 0.5 %    Neutrophils Absolute 10.2 (H) 1.8 - 8.0 K/UL    Lymphocytes Absolute 1.4 0.9 - 3.6 K/UL    Monocytes Absolute 1.3 (H) 0.05 - 1.2 K/UL    Eosinophils Absolute 0.4 0.0 - 0.4 K/UL    Basophils Absolute 0.1 0.0 - 0.1 K/UL    Immature Granulocytes Absolute 0.1 (H) 0.00 - 0.04 K/UL    Differential Type AUTOMATED     Basic Metabolic Panel    Collection Time: 12/23/24  3:26 AM   Result Value Ref Range    Sodium 143 136 - 145 mmol/L    Potassium 4.5 3.5 - 5.5 mmol/L    Chloride 118 (H) 100 - 111 mmol/L    CO2 20 (L) 21 - 32 mmol/L    Anion Gap 5 3.0 - 18 mmol/L    Glucose 96 74 - 99 mg/dL    BUN 25 (H) 7.0 - 18 MG/DL    Creatinine 0.61 0.6 - 1.3 MG/DL    BUN/Creatinine Ratio 41 (H) 12 - 20      Est, Glom Filt Rate >90 >60 ml/min/1.73m2    Calcium 7.5 (L) 8.5 - 10.1 MG/DL   POCT Glucose    Collection Time:

## 2024-12-24 ENCOUNTER — ANESTHESIA (OUTPATIENT)
Facility: HOSPITAL | Age: 79
DRG: 327 | End: 2024-12-24
Payer: MEDICARE

## 2024-12-24 LAB
HCT VFR BLD AUTO: 22.8 % (ref 35–45)
HGB BLD-MCNC: 7.3 G/DL (ref 12–16)

## 2024-12-24 PROCEDURE — 2580000003 HC RX 258: Performed by: HEALTH CARE PROVIDER

## 2024-12-24 PROCEDURE — 1100000003 HC PRIVATE W/ TELEMETRY

## 2024-12-24 PROCEDURE — 3700000001 HC ADD 15 MINUTES (ANESTHESIA): Performed by: INTERNAL MEDICINE

## 2024-12-24 PROCEDURE — 6360000002 HC RX W HCPCS: Performed by: ANESTHESIOLOGY

## 2024-12-24 PROCEDURE — 36415 COLL VENOUS BLD VENIPUNCTURE: CPT

## 2024-12-24 PROCEDURE — 6360000002 HC RX W HCPCS: Performed by: HEALTH CARE PROVIDER

## 2024-12-24 PROCEDURE — 7100000000 HC PACU RECOVERY - FIRST 15 MIN: Performed by: INTERNAL MEDICINE

## 2024-12-24 PROCEDURE — 6370000000 HC RX 637 (ALT 250 FOR IP): Performed by: INTERNAL MEDICINE

## 2024-12-24 PROCEDURE — 6360000002 HC RX W HCPCS: Performed by: PHYSICIAN ASSISTANT

## 2024-12-24 PROCEDURE — 94640 AIRWAY INHALATION TREATMENT: CPT

## 2024-12-24 PROCEDURE — 6370000000 HC RX 637 (ALT 250 FOR IP): Performed by: STUDENT IN AN ORGANIZED HEALTH CARE EDUCATION/TRAINING PROGRAM

## 2024-12-24 PROCEDURE — 6370000000 HC RX 637 (ALT 250 FOR IP): Performed by: PHYSICIAN ASSISTANT

## 2024-12-24 PROCEDURE — 3600007512: Performed by: INTERNAL MEDICINE

## 2024-12-24 PROCEDURE — 2709999900 HC NON-CHARGEABLE SUPPLY: Performed by: INTERNAL MEDICINE

## 2024-12-24 PROCEDURE — 85018 HEMOGLOBIN: CPT

## 2024-12-24 PROCEDURE — 2500000003 HC RX 250 WO HCPCS: Performed by: HEALTH CARE PROVIDER

## 2024-12-24 PROCEDURE — 99232 SBSQ HOSP IP/OBS MODERATE 35: CPT | Performed by: STUDENT IN AN ORGANIZED HEALTH CARE EDUCATION/TRAINING PROGRAM

## 2024-12-24 PROCEDURE — 0DB78ZX EXCISION OF STOMACH, PYLORUS, VIA NATURAL OR ARTIFICIAL OPENING ENDOSCOPIC, DIAGNOSTIC: ICD-10-PCS | Performed by: INTERNAL MEDICINE

## 2024-12-24 PROCEDURE — 3700000000 HC ANESTHESIA ATTENDED CARE: Performed by: INTERNAL MEDICINE

## 2024-12-24 PROCEDURE — 6360000002 HC RX W HCPCS: Performed by: INTERNAL MEDICINE

## 2024-12-24 PROCEDURE — 3600007502: Performed by: INTERNAL MEDICINE

## 2024-12-24 PROCEDURE — 85014 HEMATOCRIT: CPT

## 2024-12-24 PROCEDURE — 88305 TISSUE EXAM BY PATHOLOGIST: CPT

## 2024-12-24 PROCEDURE — 7100000001 HC PACU RECOVERY - ADDTL 15 MIN: Performed by: INTERNAL MEDICINE

## 2024-12-24 RX ORDER — SODIUM CHLORIDE 0.9 % (FLUSH) 0.9 %
5-40 SYRINGE (ML) INJECTION PRN
Status: DISCONTINUED | OUTPATIENT
Start: 2024-12-24 | End: 2024-12-24 | Stop reason: HOSPADM

## 2024-12-24 RX ORDER — LIDOCAINE HYDROCHLORIDE 10 MG/ML
1 INJECTION, SOLUTION EPIDURAL; INFILTRATION; INTRACAUDAL; PERINEURAL
Status: DISCONTINUED | OUTPATIENT
Start: 2024-12-24 | End: 2024-12-24 | Stop reason: HOSPADM

## 2024-12-24 RX ORDER — SODIUM CHLORIDE, SODIUM LACTATE, POTASSIUM CHLORIDE, CALCIUM CHLORIDE 600; 310; 30; 20 MG/100ML; MG/100ML; MG/100ML; MG/100ML
INJECTION, SOLUTION INTRAVENOUS CONTINUOUS
Status: DISCONTINUED | OUTPATIENT
Start: 2024-12-24 | End: 2024-12-24 | Stop reason: HOSPADM

## 2024-12-24 RX ORDER — SODIUM CHLORIDE 9 MG/ML
INJECTION, SOLUTION INTRAVENOUS CONTINUOUS
Status: CANCELLED | OUTPATIENT
Start: 2024-12-24

## 2024-12-24 RX ORDER — LIDOCAINE HYDROCHLORIDE 20 MG/ML
INJECTION, SOLUTION EPIDURAL; INFILTRATION; INTRACAUDAL; PERINEURAL
Status: DISCONTINUED | OUTPATIENT
Start: 2024-12-24 | End: 2024-12-24 | Stop reason: SDUPTHER

## 2024-12-24 RX ORDER — SIMETHICONE 40MG/0.6ML
SUSPENSION, DROPS(FINAL DOSAGE FORM)(ML) ORAL PRN
Status: DISCONTINUED | OUTPATIENT
Start: 2024-12-24 | End: 2024-12-24 | Stop reason: ALTCHOICE

## 2024-12-24 RX ORDER — PROPOFOL 10 MG/ML
INJECTION, EMULSION INTRAVENOUS
Status: DISCONTINUED | OUTPATIENT
Start: 2024-12-24 | End: 2024-12-24 | Stop reason: SDUPTHER

## 2024-12-24 RX ADMIN — METOCLOPRAMIDE HYDROCHLORIDE 10 MG: 5 INJECTION INTRAMUSCULAR; INTRAVENOUS at 19:42

## 2024-12-24 RX ADMIN — BUDESONIDE 1000 MCG: 0.5 INHALANT RESPIRATORY (INHALATION) at 20:50

## 2024-12-24 RX ADMIN — METOCLOPRAMIDE HYDROCHLORIDE 10 MG: 5 INJECTION INTRAMUSCULAR; INTRAVENOUS at 00:01

## 2024-12-24 RX ADMIN — HYDROXYCHLOROQUINE SULFATE 200 MG: 200 TABLET ORAL at 12:47

## 2024-12-24 RX ADMIN — METOCLOPRAMIDE HYDROCHLORIDE 10 MG: 5 INJECTION INTRAMUSCULAR; INTRAVENOUS at 22:42

## 2024-12-24 RX ADMIN — MIRTAZAPINE 15 MG: 15 TABLET, FILM COATED ORAL at 22:42

## 2024-12-24 RX ADMIN — IPRATROPIUM BROMIDE 0.5 MG: 0.5 SOLUTION RESPIRATORY (INHALATION) at 14:02

## 2024-12-24 RX ADMIN — ACETAMINOPHEN 650 MG: 325 TABLET ORAL at 22:39

## 2024-12-24 RX ADMIN — PROPOFOL 100 MG: 10 INJECTION, EMULSION INTRAVENOUS at 08:42

## 2024-12-24 RX ADMIN — IPRATROPIUM BROMIDE 0.5 MG: 0.5 SOLUTION RESPIRATORY (INHALATION) at 20:50

## 2024-12-24 RX ADMIN — METOCLOPRAMIDE HYDROCHLORIDE 10 MG: 5 INJECTION INTRAMUSCULAR; INTRAVENOUS at 06:14

## 2024-12-24 RX ADMIN — SODIUM CHLORIDE 40 MG: 9 INJECTION INTRAMUSCULAR; INTRAVENOUS; SUBCUTANEOUS at 06:14

## 2024-12-24 RX ADMIN — ARFORMOTEROL TARTRATE 15 MCG: 15 SOLUTION RESPIRATORY (INHALATION) at 20:50

## 2024-12-24 RX ADMIN — SODIUM CHLORIDE: 9 INJECTION, SOLUTION INTRAVENOUS at 07:57

## 2024-12-24 RX ADMIN — LIDOCAINE HYDROCHLORIDE 100 MG: 20 INJECTION, SOLUTION EPIDURAL; INFILTRATION; INTRACAUDAL; PERINEURAL at 08:42

## 2024-12-24 RX ADMIN — IPRATROPIUM BROMIDE 0.5 MG: 0.5 SOLUTION RESPIRATORY (INHALATION) at 03:13

## 2024-12-24 RX ADMIN — SODIUM CHLORIDE, PRESERVATIVE FREE 10 ML: 5 INJECTION INTRAVENOUS at 12:48

## 2024-12-24 RX ADMIN — SERTRALINE HYDROCHLORIDE 100 MG: 50 TABLET ORAL at 12:47

## 2024-12-24 RX ADMIN — SODIUM CHLORIDE, PRESERVATIVE FREE 10 ML: 5 INJECTION INTRAVENOUS at 22:43

## 2024-12-24 RX ADMIN — METOCLOPRAMIDE HYDROCHLORIDE 10 MG: 5 INJECTION INTRAMUSCULAR; INTRAVENOUS at 12:47

## 2024-12-24 RX ADMIN — SODIUM CHLORIDE 40 MG: 9 INJECTION INTRAMUSCULAR; INTRAVENOUS; SUBCUTANEOUS at 19:42

## 2024-12-24 RX ADMIN — MIRTAZAPINE 15 MG: 15 TABLET, FILM COATED ORAL at 22:39

## 2024-12-24 ASSESSMENT — PAIN DESCRIPTION - ONSET: ONSET: GRADUAL

## 2024-12-24 ASSESSMENT — PAIN - FUNCTIONAL ASSESSMENT
PAIN_FUNCTIONAL_ASSESSMENT: 0-10
PAIN_FUNCTIONAL_ASSESSMENT: PREVENTS OR INTERFERES SOME ACTIVE ACTIVITIES AND ADLS
PAIN_FUNCTIONAL_ASSESSMENT: 0-10

## 2024-12-24 ASSESSMENT — PAIN DESCRIPTION - LOCATION: LOCATION: NECK

## 2024-12-24 ASSESSMENT — COPD QUESTIONNAIRES: CAT_SEVERITY: MODERATE

## 2024-12-24 ASSESSMENT — PAIN DESCRIPTION - DESCRIPTORS: DESCRIPTORS: ACHING;DULL

## 2024-12-24 ASSESSMENT — PAIN DESCRIPTION - FREQUENCY: FREQUENCY: INTERMITTENT

## 2024-12-24 ASSESSMENT — PAIN SCALES - GENERAL
PAINLEVEL_OUTOF10: 0
PAINLEVEL_OUTOF10: 0
PAINLEVEL_OUTOF10: 6
PAINLEVEL_OUTOF10: 0

## 2024-12-24 ASSESSMENT — PAIN DESCRIPTION - ORIENTATION: ORIENTATION: LEFT

## 2024-12-24 ASSESSMENT — PAIN DESCRIPTION - PAIN TYPE: TYPE: ACUTE PAIN

## 2024-12-24 NOTE — ANESTHESIA PRE PROCEDURE
Department of Anesthesiology  Preprocedure Note       Name:  Annamarie Velásquez   Age:  79 y.o.  :  1945                                          MRN:  [id number]         Date:  2024      Surgeon: Surgeon(s):  Vik Parks MD    Procedure: Procedure(s):  ESOPHAGOGASTRODUODENOSCOPY    Medications prior to admission:   Prior to Admission medications    Medication Sig Start Date End Date Taking? Authorizing Provider   denosumab (PROLIA) 60 MG/ML SOSY SC injection Inject 1 mL into the skin 24  Yes ProviderKatherine MD   fluticasone-umeclidin-vilant (TRELEGY ELLIPTA) 200-62.5-25 MCG/ACT AEPB inhaler Inhale 1 puff into the lungs daily 24 Yes Katherine Dee MD   meloxicam (MOBIC) 7.5 MG tablet Take 1 tablet by mouth daily 24  Yes Katherine Dee MD   valACYclovir (VALTREX) 500 MG tablet Take 2 tablets by mouth 2 times daily 24  Yes Katherine Dee MD   esomeprazole (NEXIUM) 20 MG delayed release capsule Take 1 capsule by mouth daily   Yes ProviderKatherine MD   albuterol sulfate HFA (PROVENTIL;VENTOLIN;PROAIR) 108 (90 Base) MCG/ACT inhaler Inhale 2 puffs into the lungs 2 times daily 6/23/15  Yes Automatic Reconciliation, Ar   cyclobenzaprine (FLEXERIL) 5 MG tablet Take 1 tablet by mouth   Yes Automatic Reconciliation, Ar   hydroxychloroquine (PLAQUENIL) 200 MG tablet Take 1 tablet by mouth daily 23   Katherine Dee MD   methotrexate (RHEUMATREX) 2.5 MG chemo tablet Take 10 tablets by mouth once a week Take 10 tablets every 7 days    Katherine Dee MD   mirtazapine (REMERON) 15 MG tablet TAKE 1 TABLET BY MOUTH ONCE DAILY BEFORE BEDTIME 8/3/23   Katherine Dee MD   sertraline (ZOLOFT) 100 MG tablet Take 1 tablet by mouth daily 8/3/23   Katherine Dee MD   FOLIC ACID PO Take by mouth    Automatic Reconciliation, Ar       Current medications:    Current Facility-Administered Medications   Medication Dose Route Frequency

## 2024-12-24 NOTE — PROGRESS NOTES
TRANSFER - OUT REPORT:    Verbal report given to DARSHAN Mcallister on Annamarie Velásquez  being transferred to [facility] for routine progression of patient care       Report consisted of patient's Situation, Background, Assessment and   Recommendations(SBAR).     Information from the following report(s) Nurse Handoff Report was reviewed with the receiving nurse.           Lines:   Peripheral IV 12/20/24 Left External Jugular (Active)   Site Assessment Clean, dry & intact 12/23/24 0325   Line Status Capped;Flushed;Normal saline locked 12/23/24 0325   Line Care Cap changed;Connections checked and tightened 12/23/24 0325   Phlebitis Assessment No symptoms 12/23/24 0325   Infiltration Assessment 0 12/23/24 0325   Alcohol Cap Used Yes 12/23/24 0325   Dressing Status Clean, dry & intact 12/23/24 0325   Dressing Type Transparent 12/23/24 0325        Opportunity for questions and clarification was provided.

## 2024-12-24 NOTE — PLAN OF CARE
Problem: Discharge Planning  Goal: Discharge to home or other facility with appropriate resources  Description: Patient anticipated discharge home.   12/24/2024 1328 by Ary Maciel RN  Outcome: Progressing  Flowsheets (Taken 12/24/2024 1328)  Discharge to home or other facility with appropriate resources:   Identify discharge learning needs (meds, wound care, etc)   Arrange for needed discharge resources and transportation as appropriate  Note: Collaborate with case management regarding discharge  12/24/2024 0220 by Janine Blankenship RN  Outcome: Progressing     Problem: ABCDS Injury Assessment  Goal: Absence of physical injury  12/24/2024 1328 by Ary Maciel RN  Outcome: Progressing  Flowsheets (Taken 12/22/2024 0008 by Cheyenne Sequeira, RN)  Absence of Physical Injury: Implement safety measures based on patient assessment  Note: Monitor skin for s/s of breakdown  12/24/2024 0220 by Janine Blankenship RN  Outcome: Progressing     Problem: Safety - Adult  Goal: Free from fall injury  Description: Patient to call before getting up to use the bathroom.   12/24/2024 1328 by Ary Macile RN  Outcome: Progressing  Flowsheets (Taken 12/24/2024 1328)  Free From Fall Injury:   Instruct family/caregiver on patient safety   Based on caregiver fall risk screen, instruct family/caregiver to ask for assistance with transferring infant if caregiver noted to have fall risk factors  Note: Perform hourly rounding  Have bed/chair alarm on    12/24/2024 0220 by Janine Blankenship RN  Outcome: Progressing     Problem: Skin/Tissue Integrity  Goal: Absence of new skin breakdown  Description: 1.  Monitor for areas of redness and/or skin breakdown  2.  Assess vascular access sites hourly  3.  Every 4-6 hours minimum:  Change oxygen saturation probe site  4.  Every 4-6 hours:  If on nasal continuous positive airway pressure, respiratory therapy assess nares and determine need for appliance change or resting period.  12/24/2024 1328

## 2024-12-24 NOTE — PROGRESS NOTES
4 Eyes Skin Assessment     NAME:  Annamarie Velásquez  YOB: 1945  MEDICAL RECORD NUMBER:  [id number]    The patient is being assessed for  Shift Handoff    I agree that at least one RN has performed a thorough Head to Toe Skin Assessment on the patient. ALL assessment sites listed below have been assessed.      Areas assessed by both nurses:    Head, Face, Ears, Shoulders, Back, Chest, Arms, Elbows, Hands, and Sacrum. Buttock, Coccyx, Ischium        Does the Patient have a Wound? No noted wound(s)       Jamaal Prevention initiated by RN: No  Wound Care Orders initiated by RN: No    Pressure Injury (Stage 3,4, Unstageable, DTI, NWPT, and Complex wounds) if present, place Wound referral order by RN under : No    New Ostomies, if present place, Ostomy referral order under : No     Nurse 1 eSignature: Electronically signed by Janine Blankenship RN on 12/24/24 at 8:00 AM EST    **SHARE this note so that the co-signing nurse can place an eSignature**    Nurse 2 eSignature: Electronically signed by Ary Maciel RN on 12/24/24 at 1:23 PM EST

## 2024-12-24 NOTE — ANESTHESIA POSTPROCEDURE EVALUATION
Department of Anesthesiology  Postprocedure Note    Patient: Annamarie Velásquez  MRN: [id number]  YOB: 1945  Date of evaluation: 12/24/2024    Procedure Summary       Date: 12/24/24 Room / Location: [facility] ENDO 02 / [facility] ENDOSCOPY    Anesthesia Start: 0834 Anesthesia Stop: 0902    Procedure: ESOPHAGOGASTRODUODENOSCOPY w/bx (Upper GI Region) Diagnosis:       Gastrointestinal hemorrhage, unspecified gastrointestinal hemorrhage type      (Gastrointestinal hemorrhage, unspecified gastrointestinal hemorrhage type [K92.2])    Surgeons: Vik Parks MD Responsible Provider: Roddy Herzog MD    Anesthesia Type: MAC ASA Status: 3            Anesthesia Type: MAC    Mark Anthony Phase I: Mark Anthony Score: 10    Mark Anthony Phase II:      Anesthesia Post Evaluation    Patient location during evaluation: bedside  Patient participation: complete - patient participated  Level of consciousness: awake  Pain score: 0  Airway patency: patent  Nausea & Vomiting: no nausea  Cardiovascular status: hemodynamically stable  Respiratory status: acceptable  Hydration status: euvolemic  Pain management: satisfactory to patient    No notable events documented.

## 2024-12-24 NOTE — PROGRESS NOTES
Bedside and Verbal shift change report given to DARSHAN Mcallister (oncoming nurse) by DARSHAN Granger (offgoing nurse). Report included the following information Nurse Handoff Report, Adult Overview, Intake/Output, MAR, and Recent Results.     0744: Patient off unit for scheduled procedure.     0917: Received report from endo nurse.     0930: Patient arrived back on unit. Alert and oriented per baseline.     1654: Patient requesting pudding. MD made aware. Telephone order received to advance from clears to full liquid as long as patient tolerates. RBV.     1930: Bedside and Verbal shift change report given to DARSHAN Teresa (oncoming nurse) by DARSHAN Mcallister (offgoing nurse). Report included the following information Nurse Handoff Report, Adult Overview, Intake/Output, MAR, and Recent Results.

## 2024-12-24 NOTE — PROCEDURES
[address] [address]     Endoscopic Gastroduodenoscopy Procedure Note    Annamarie Velásquez  1945  [id number]    Indication: GI bleeding    : Vik Parks MD    Referring Provider:  Luisa Davidson MD    Anesthesia/Sedation:  MAC anesthesia Propofol      Procedure Details     After infomed consent was obtained for the procedure, with all risks and benefits of procedure explained the patient was taken to the endoscopy suite and placed in the left lateral decubitus position.  Following sequential administration of sedation as per above, the endoscope was inserted into the mouth and advanced under direct vision to second portion of the duodenum.  A careful inspection was made as the gastroscope was withdrawn, including a retroflexed view of the proximal stomach; findings and interventions are described below.      Findings:   Esophagus:normal  Stomach: normal   Duodenum/jejunum: normal    Therapies:  biopsy of stomach antrum    Specimens:   ID Type Source Tests Collected by Time Destination   A : Gastric bx Tissue Gastric SURGICAL PATHOLOGY Vik Parks MD 12/24/2024 0851               Complications:   None; patient tolerated the procedure well.    EBL:  None.           Impression:    * No post-op diagnosis entered *      Recommendations:  -Acid suppression with a proton pump inhibitor., -Await pathology., -Clear liquid diet and advance as tolerated.  Source of bleeding is UNCLEAR.  NO ulceration seen in esophagus stomach or duodenum and no mucosal lesions noted.  Careful inspection did not reveal a suspected Dieulafoy lesion.   Observe    Vik Parks MD  12/24/2024  8:57 AMPROCEDURE NOTE  Date: 12/24/2024   Name: Annamarie Velásquez  YOB: 1945    Procedures

## 2024-12-24 NOTE — PROGRESS NOTES
[facility] Hospitalist Group  Progress Note    Patient: Annamarie Velásquez Age: 79 y.o. : 1945 MR#: [id number] SSN: [id number]  Date/Time: 2024     Chief Complaint   Patient presents with    Abdominal Pain       Subjective:   Patient seen and examined. In good spirits.  No acute events overnight. no further bowel movements..denies chest pain, abdominal pain, shortness of breath, nausea or emesis.  Discussed plan for repeat EGD later today.  No further units PRBC  Assessment/Plan:   Acute on chronic anemia: underlying alpha thalassemia  Suspected upper GIB: hx NSAID use  GERD  - IV PPI BID, IV reglan. Monitor Hgb, transfuse to keep>7. S/p 2u pRBC. Follow H/H.  Clear liquid diet advance as tolerated.  -GI input appreciated.  Status post EGD with UGI hemorrhage with large clot burden in gastric body/fundus.  Repeat EGD 24 per GI with no ulceration seen in the esophagus stomach or duodenum normal proposal lesions noted no Dula for lesion unclear source of bleed at the moment continue to monitor.   - avoid NSAIDs or chemical DVT ppx, place SCDs.     Lactic acidosis, resolved  -Normal mesenteric duplex.      Syncope and collapse  - echo results reviewed., orthostats pending.  Tele  RA  - cont hydroxychloroquine     Anxiety  Depression  - cont sertraline, remeron     COPD, not in active exacerbation.  - cont trelegy or formulary equivalent, prn Duonebs.    Dispo plan: anticipated discharge date -24 pending continued medical stability, monitor for rebleeding, trending hemoglobin.  Continue stepdown level of care    I spent 35 minutes with the patient in face-to-face consultation, of which greater than 50% was spent in counseling and coordination of care as described above.    Family Communication/Updates:  [x]  Patient is clinically able to contact and update Family  []  Patient is NOT clinically able to contact and update Family; Patient requires Clinician to contact  ipratropium (ATROVENT) 0.02 % nebulizer solution 0.5 mg  0.5 mg Nebulization Q6H RT    sertraline (ZOLOFT) tablet 100 mg  100 mg Oral Daily    mirtazapine (REMERON) tablet 15 mg  15 mg Oral Nightly    hydroxychloroquine (PLAQUENIL) tablet 200 mg  200 mg Oral Daily    ipratropium 0.5 mg-albuterol 2.5 mg (DUONEB) nebulizer solution 1 Dose  1 Dose Inhalation Q4H PRN       Labs:    Recent Results (from the past 24 hour(s))   Hemoglobin and Hematocrit    Collection Time: 12/23/24  3:15 PM   Result Value Ref Range    Hemoglobin 7.9 (L) 12.0 - 16.0 g/dL    Hematocrit 24.4 (L) 35.0 - 45.0 %       Signed By: Jeremy Valiente DO     December 24, 2024      Disclaimer: Sections of this note are dictated using utilizing voice recognition software.  Minor typographical errors may be present. If questions arise, please do not hesitate to contact me or call our department.

## 2024-12-24 NOTE — PLAN OF CARE
Problem: Pain  Goal: Verbalizes/displays adequate comfort level or baseline comfort level  Description: Pt. Educated on pain medication availability per MAR.  Pt. Verbalized understanding. Pt. Denies pain.  Outcome: Progressing     Problem: Discharge Planning  Goal: Discharge to home or other facility with appropriate resources  Description: Patient anticipated discharge home.   Outcome: Progressing     Problem: ABCDS Injury Assessment  Goal: Absence of physical injury  Outcome: Progressing     Problem: Safety - Adult  Goal: Free from fall injury  Description: Patient to call before getting up to use the bathroom.   Outcome: Progressing     Problem: Skin/Tissue Integrity  Goal: Absence of new skin breakdown  Description: 1.  Monitor for areas of redness and/or skin breakdown  2.  Assess vascular access sites hourly  3.  Every 4-6 hours minimum:  Change oxygen saturation probe site  4.  Every 4-6 hours:  If on nasal continuous positive airway pressure, respiratory therapy assess nares and determine need for appliance change or resting period.  Outcome: Progressing

## 2024-12-24 NOTE — PROGRESS NOTES
Physician Progress Note      PATIENT:               EVELYN SNYDER  [facility] #:                  [id number]  :                       1945  ADMIT DATE:       2024 11:53 AM  DISCH DATE:  RESPONDING  PROVIDER #:        Jeremy Valiente DO        QUERY TEXT:    Type of Anemia: Please provide further specificity, if known.    Clinical indicators include: blood in stool, hgb, chemo, melena, bright red   blood, rbc, hemoglobin, hematocrit, platelets, hemorrhage, chronic anemia,   h&h, transfuse, 2u prbc, h/h, 1 units, 1  unit, hemoglobin and hematocrit, iron, tibc, platelet, platelets  rbc, red blood cell, gastrointestinal bleeding, black stool, anemia, red blood   cells, black stools, bleeding, gi bleeding, gi bleed, nosebleeds, hematuria,   hct, clot, units prbc, bleed  Options provided:  -- Anemia due to acute blood loss  -- Anemia due to chronic blood loss  -- Anemia due to iron deficiency  -- Anemia due to postoperative blood loss  -- Anemia due to chronic disease  -- Other - I will add my own diagnosis  -- Disagree - Not applicable / Not valid  -- Disagree - Clinically Unable to determine / Unknown        PROVIDER RESPONSE TEXT:    The patient has anemia due to acute blood loss.      Electronically signed by:  Jeremy Valiente DO 2024 2:34 PM

## 2024-12-25 VITALS
BODY MASS INDEX: 19.31 KG/M2 | HEIGHT: 66 IN | OXYGEN SATURATION: 100 % | RESPIRATION RATE: 18 BRPM | DIASTOLIC BLOOD PRESSURE: 69 MMHG | TEMPERATURE: 98.4 F | HEART RATE: 66 BPM | WEIGHT: 120.15 LBS | SYSTOLIC BLOOD PRESSURE: 107 MMHG

## 2024-12-25 LAB
ANION GAP SERPL CALC-SCNC: 4 MMOL/L (ref 3–18)
BUN SERPL-MCNC: 9 MG/DL (ref 7–18)
BUN/CREAT SERPL: 13 (ref 12–20)
CALCIUM SERPL-MCNC: 7.3 MG/DL (ref 8.5–10.1)
CHLORIDE SERPL-SCNC: 112 MMOL/L (ref 100–111)
CO2 SERPL-SCNC: 21 MMOL/L (ref 21–32)
CREAT SERPL-MCNC: 0.7 MG/DL (ref 0.6–1.3)
GLUCOSE SERPL-MCNC: 125 MG/DL (ref 74–99)
HCT VFR BLD AUTO: 22.9 % (ref 35–45)
HCT VFR BLD AUTO: 24.2 % (ref 35–45)
HCT VFR BLD AUTO: 25.3 % (ref 35–45)
HGB BLD-MCNC: 7.2 G/DL (ref 12–16)
HGB BLD-MCNC: 7.7 G/DL (ref 12–16)
HGB BLD-MCNC: 8 G/DL (ref 12–16)
POTASSIUM SERPL-SCNC: 4 MMOL/L (ref 3.5–5.5)
SODIUM SERPL-SCNC: 137 MMOL/L (ref 136–145)

## 2024-12-25 PROCEDURE — 80048 BASIC METABOLIC PNL TOTAL CA: CPT

## 2024-12-25 PROCEDURE — 6360000002 HC RX W HCPCS: Performed by: HEALTH CARE PROVIDER

## 2024-12-25 PROCEDURE — 94761 N-INVAS EAR/PLS OXIMETRY MLT: CPT

## 2024-12-25 PROCEDURE — 6360000002 HC RX W HCPCS: Performed by: INTERNAL MEDICINE

## 2024-12-25 PROCEDURE — 6370000000 HC RX 637 (ALT 250 FOR IP): Performed by: PHYSICIAN ASSISTANT

## 2024-12-25 PROCEDURE — 2580000003 HC RX 258: Performed by: HEALTH CARE PROVIDER

## 2024-12-25 PROCEDURE — 99239 HOSP IP/OBS DSCHRG MGMT >30: CPT | Performed by: STUDENT IN AN ORGANIZED HEALTH CARE EDUCATION/TRAINING PROGRAM

## 2024-12-25 PROCEDURE — 85018 HEMOGLOBIN: CPT

## 2024-12-25 PROCEDURE — 36415 COLL VENOUS BLD VENIPUNCTURE: CPT

## 2024-12-25 PROCEDURE — 2500000003 HC RX 250 WO HCPCS: Performed by: HEALTH CARE PROVIDER

## 2024-12-25 PROCEDURE — 85014 HEMATOCRIT: CPT

## 2024-12-25 RX ORDER — PANTOPRAZOLE SODIUM 40 MG/1
40 TABLET, DELAYED RELEASE ORAL 2 TIMES DAILY
Qty: 90 TABLET | Refills: 1 | Status: SHIPPED | OUTPATIENT
Start: 2024-12-25

## 2024-12-25 RX ADMIN — SERTRALINE HYDROCHLORIDE 100 MG: 50 TABLET ORAL at 09:39

## 2024-12-25 RX ADMIN — SODIUM CHLORIDE 40 MG: 9 INJECTION INTRAMUSCULAR; INTRAVENOUS; SUBCUTANEOUS at 05:04

## 2024-12-25 RX ADMIN — SODIUM CHLORIDE, PRESERVATIVE FREE 10 ML: 5 INJECTION INTRAVENOUS at 09:39

## 2024-12-25 RX ADMIN — HYDROXYCHLOROQUINE SULFATE 200 MG: 200 TABLET ORAL at 09:39

## 2024-12-25 RX ADMIN — METOCLOPRAMIDE HYDROCHLORIDE 10 MG: 5 INJECTION INTRAMUSCULAR; INTRAVENOUS at 11:21

## 2024-12-25 RX ADMIN — METOCLOPRAMIDE HYDROCHLORIDE 10 MG: 5 INJECTION INTRAMUSCULAR; INTRAVENOUS at 05:05

## 2024-12-25 ASSESSMENT — PAIN SCALES - GENERAL
PAINLEVEL_OUTOF10: 0

## 2024-12-25 NOTE — DISCHARGE SUMMARY
[facility]    Discharge Summary    Patient: Annamarie Velásquez MRN: [id number]  CSN: [id number]    YOB: 1945  Age: 79 y.o.  Sex: female    DOA: 12/20/2024 LOS:  LOS: 5 days   Discharge Date:      Admission Diagnoses: Upper GI bleed [K92.2]  Pain of upper abdomen [R10.10]  Gastrointestinal hemorrhage, unspecified gastrointestinal hemorrhage type [K92.2]    Discharge Diagnoses:    Hospital Problems             Last Modified POA    * (Principal) Upper GI bleed 12/20/2024 Yes    Chronic obstructive lung disease (HCC) 12/20/2024 Yes    Gastroesophageal reflux disease 12/20/2024 Yes    Constipation 12/20/2024 Yes    Rheumatoid arthritis (HCC) 12/20/2024 Yes   COPD, not in active exacerbation  Lactic acidosis,  Acute on chronic anemia secondary to alpha thalassemia    Discharge Condition: Stable    Discharge To: Home    Consults: Gastroenterology    HPI:   Per admission HPI   79 y.o. female with a PMHx of anxiety, depression, COPD, thalassemia, GERD, HTN, RA who presented to the ED with c/o syncope. Patient states her grandson found her on the bathroom floor today after she passed out while going to the bathroom. She reports feeling very lightheaded last night making it difficult to walk up the stairs. For the past 3 weeks, she's had intermittent epigastric cramping pain. It improves with OTC indigestion medicine and is sometimes made worse with eating. She denies blood in stool until today in the ER when she was given a laxative. After the laxative worked, her stool was black. She denies fever, cough, sob, cp, NV.      In the ED, she was intermittently hypotensive. Labs with BUN/Cr 63, , lactic 3.64 -> 5.82, hgb 8.8 (baseline 11). UA negative. CTAP negative for acute finding, but does show moderate retained fecal material throughout the transverse and descending colon as well as the distal rectum. GI was consulted.        Hospital Course:   Upon admission,  ABDOMEN PELVIS WO CONTRAST Additional Contrast? None    Result Date: 12/20/2024  EXAM: CT ABDOMEN/PELVIS  CPT code: 65732, 72339 CLINICAL INDICATION/HISTORY: Abdominal pain. COMPARISON: CTA chest of 27 February 2021. TECHNIQUE: Helical axial non-contrast images of the abdomen and pelvis are obtained from the domes of the diaphragm to the ischia. FINDINGS: CT Abdomen: There are two small pulmonary nodules, one about 3 mm in the anterosuperior right middle lobe (axial 1) and the other about 4 mm in the anterosuperior right middle lobe (axial 2). There is no interval change in the remainder of the lungs is clear.  No focal abnormalities are seen in the solid abdominal viscera except for a 1.1 cm low-attenuation lesion in segment 1, possible cyst, stable.  No biliary abnormalities are seen.  There is no hydronephrosis or nephrolithiasis.  The visualized portion of the bowel is normal except for a moderate amount of retained fecal material in the transverse and descending colon.  There is no significant retroperitoneal lymphadenopathy.  No significant vascular abnormalities are seen except for moderate atherosclerosis in the distal aorta without significant stenosis or aneurysmal dilatation. There is a scoliosis of the lumbar spine convex to the left centered about L3. There is a subtle anterolisthesis at L4-5.. Significant disc space narrowing is seen at most levels except for L3-4. There is no critical stenosis. CT Pelvis: The visualized portion of the bowel is unremarkable. No appendix is seen but there is no pericecal inflammatory change.  No significant adenopathy is seen. The bladder is not well filled but to the extent seen is unremarkable. No specific pelvic abnormalities are noted.  The bones and soft tissues are unremarkable.     There is no specific abdominal or pelvic finding to correlate with the patient's symptoms. There is a moderate moderate retained fecal material throughout the transverse and descending  colon as well as the distal rectum but without other significant abnormality. The appendix is not seen but there is no significant pericecal inflammatory change. Small low-attenuation lesion is seen in the caudate lobe of the liver, possible cyst, stable. No other hepatobiliary abnormalities are noted. Scoliosis and spondylitic and disc degenerative changes in the lumbar spine, as described. No critical canal stenosis. 2 small pulmonary nodules 4 mm or less in the right middle lobe, stable. No other abnormality in the visualized lungs. =================== Note: [facility] maintains that all CT scans at their facilities are performed by using dose optimization technique as appropriate to a performed examination, to include automated exposure control, adjustment of the mAs and/or kVp according to patient size (including appropriate matching for site specific examinations) or use of iterative reconstruction technique. Electronically signed by Josh Rico      Procedures:  EGD    Discharge Medications:     Current Discharge Medication List        CONTINUE these medications which have NOT CHANGED    Details   denosumab (PROLIA) 60 MG/ML SOSY SC injection Inject 1 mL into the skin      fluticasone-umeclidin-vilant (TRELEGY ELLIPTA) 200-62.5-25 MCG/ACT AEPB inhaler Inhale 1 puff into the lungs daily      meloxicam (MOBIC) 7.5 MG tablet Take 1 tablet by mouth daily      valACYclovir (VALTREX) 500 MG tablet Take 2 tablets by mouth 2 times daily      esomeprazole (NEXIUM) 20 MG delayed release capsule Take 1 capsule by mouth daily      albuterol sulfate HFA (PROVENTIL;VENTOLIN;PROAIR) 108 (90 Base) MCG/ACT inhaler Inhale 2 puffs into the lungs 2 times daily      cyclobenzaprine (FLEXERIL) 5 MG tablet Take 1 tablet by mouth      hydroxychloroquine (PLAQUENIL) 200 MG tablet Take 1 tablet by mouth daily      methotrexate (RHEUMATREX) 2.5 MG chemo tablet Take 10 tablets by mouth once a week Take 10 tablets every 7

## 2024-12-25 NOTE — PROGRESS NOTES
Feels well. No bleeding. Wants to eat.  PE: /69   Pulse 66   Temp 98.4 °F (36.9 °C) (Oral)   Resp 18   Ht 1.676 m (5' 6\")   Wt 54.5 kg (120 lb 2.4 oz)   SpO2 100%   BMI 19.39 kg/m²   Abdomen benign exam    A/P: GI Bleed. EGD negative for PUD. Advance diet. Will follow in office.   Will sign off. Call us as needed.    LAYA Rios MD

## 2024-12-25 NOTE — CARE COORDINATION
D/C order noted for today. Orders reviewed. No needs identified at this time. Patient's grandson to transport.  remains available if needed.    Julia Coreas, CONCETTAN, RN  Case Management   [phone]

## 2024-12-25 NOTE — PLAN OF CARE
Problem: Safety - Adult  Goal: Free from fall injury  Description: Patient to call before getting up to use the bathroom.   12/24/2024 2332 by Brii Lennon RN  Outcome: Progressing  Flowsheets (Taken 12/24/2024 2332)  Free From Fall Injury: Instruct family/caregiver on patient safety  Note: Make sure patient's bed is in the lowest position and bed alarm on to prevent any fall  12/24/2024 1328 by Ary Maciel RN  Outcome: Progressing  Flowsheets (Taken 12/24/2024 1328)  Free From Fall Injury:   Instruct family/caregiver on patient safety   Based on caregiver fall risk screen, instruct family/caregiver to ask for assistance with transferring infant if caregiver noted to have fall risk factors  Note: Perform hourly rounding  Have bed/chair alarm on       Problem: Skin/Tissue Integrity  Goal: Absence of new skin breakdown  Description: 1.  Monitor for areas of redness and/or skin breakdown  2.  Assess vascular access sites hourly  3.  Every 4-6 hours minimum:  Change oxygen saturation probe site  4.  Every 4-6 hours:  If on nasal continuous positive airway pressure, respiratory therapy assess nares and determine need for appliance change or resting period.  12/24/2024 2332 by Brii Lennon RN  Outcome: Progressing  Note: Assess patient's skin for any signs of breakdown and encourage pt to reposition frequently  12/24/2024 1328 by Ary Maciel RN  Outcome: Progressing  Note: Monitor skin for s/s of breakdown     Problem: ABCDS Injury Assessment  Goal: Absence of physical injury  12/24/2024 2332 by Brii Lennon RN  Outcome: Progressing  12/24/2024 1328 by Ary Maciel RN  Outcome: Progressing  Flowsheets (Taken 12/22/2024 0008 by Cheyenne Sequeira, RN)  Absence of Physical Injury: Implement safety measures based on patient assessment  Note: Monitor skin for s/s of breakdown

## 2025-07-03 ENCOUNTER — HOSPITAL ENCOUNTER (EMERGENCY)
Age: 80
Discharge: HOME | End: 2025-07-03
Attending: EMERGENCY MEDICINE
Payer: MEDICARE

## 2025-07-03 VITALS
HEIGHT: 66 IN | BODY MASS INDEX: 16.07 KG/M2 | DIASTOLIC BLOOD PRESSURE: 71 MMHG | OXYGEN SATURATION: 100 % | WEIGHT: 100 LBS | TEMPERATURE: 98.9 F | RESPIRATION RATE: 18 BRPM | SYSTOLIC BLOOD PRESSURE: 117 MMHG | HEART RATE: 80 BPM

## 2025-07-03 DIAGNOSIS — K08.89 PAIN, DENTAL: Primary | ICD-10-CM

## 2025-07-03 PROCEDURE — 6370000000 HC RX 637 (ALT 250 FOR IP): Performed by: EMERGENCY MEDICINE

## 2025-07-03 PROCEDURE — 99283 EMERGENCY DEPT VISIT LOW MDM: CPT

## 2025-07-03 RX ORDER — AZITHROMYCIN 250 MG/1
TABLET, FILM COATED ORAL
Qty: 1 PACKET | Refills: 0 | Status: SHIPPED | OUTPATIENT
Start: 2025-07-03 | End: 2025-07-07

## 2025-07-03 RX ORDER — AZITHROMYCIN 250 MG/1
500 TABLET, FILM COATED ORAL
Status: COMPLETED | OUTPATIENT
Start: 2025-07-03 | End: 2025-07-03

## 2025-07-03 RX ORDER — OXYCODONE AND ACETAMINOPHEN 5; 325 MG/1; MG/1
1 TABLET ORAL
Refills: 0 | Status: COMPLETED | OUTPATIENT
Start: 2025-07-03 | End: 2025-07-03

## 2025-07-03 RX ORDER — HYDROCODONE BITARTRATE AND ACETAMINOPHEN 5; 325 MG/1; MG/1
1 TABLET ORAL EVERY 6 HOURS PRN
Qty: 12 TABLET | Refills: 0 | Status: SHIPPED | OUTPATIENT
Start: 2025-07-03 | End: 2025-07-06

## 2025-07-03 RX ADMIN — AZITHROMYCIN DIHYDRATE 500 MG: 250 TABLET ORAL at 01:29

## 2025-07-03 RX ADMIN — OXYCODONE AND ACETAMINOPHEN 1 TABLET: 5; 325 TABLET ORAL at 01:29

## 2025-07-03 ASSESSMENT — PAIN SCALES - GENERAL
PAINLEVEL_OUTOF10: 10
PAINLEVEL_OUTOF10: 10
PAINLEVEL_OUTOF10: 8

## 2025-07-03 ASSESSMENT — PAIN - FUNCTIONAL ASSESSMENT
PAIN_FUNCTIONAL_ASSESSMENT: 0-10
PAIN_FUNCTIONAL_ASSESSMENT: 0-10

## 2025-07-03 ASSESSMENT — ENCOUNTER SYMPTOMS
SINUS PAIN: 0
SORE THROAT: 0
RESPIRATORY NEGATIVE: 1
FACIAL SWELLING: 1

## 2025-07-03 ASSESSMENT — PAIN DESCRIPTION - LOCATION
LOCATION: MOUTH

## 2025-07-03 NOTE — ED PROVIDER NOTES
`[facility] EMERGENCY DEPARTMENT  eMERGENCY dEPARTMENT eNCOUnter      Pt Name: Annamarie Velásquez  MRN: [id number]  Birthdate 1945 of evaluation: 7/3/2025  Provider:Clark Chávez MD    CHIEF COMPLAINT         HPI    Annamarie Velásquez is a 80 y.o. female  c/o having a toothache that started 1 week ago.  Denies having fever or chills..     ROS    Review of Systems   HENT:  Positive for dental problem and facial swelling. Negative for sinus pain and sore throat.    Respiratory: Negative.     Cardiovascular: Negative.    All other systems reviewed and are negative.      Except as noted above the remainder of the review of systems was reviewed and negative.       PAST MEDICAL HISTORY     Past Medical History:   Diagnosis Date    Anxiety and depression     anxiety, depression    Asthma     Chronic obstructive pulmonary disease (HCC)     Coagulation disorder     alpha thalassemia     GERD (gastroesophageal reflux disease)     Hypertension     -- no meds    Ill-defined condition     seasonal allergies    RA (rheumatoid arthritis) ([facility])     Rheumatoid arthritis (HCC) 2024         SURGICAL HISTORY       Past Surgical History:   Procedure Laterality Date    APPENDECTOMY      CATARACT REMOVAL Bilateral      SECTION      x 2    CHOLECYSTECTOMY      COLONOSCOPY N/A 2021    COLONOSCOPY; POLYPECTOMY performed by FAN Mccabe MD at [facility] ENDOSCOPY    HERNIA REPAIR      ventral    MOHS SURGERY Right     ORTHOPEDIC SURGERY Bilateral     knee replacement    SHOULDER ARTHROSCOPY Right 2006    rotator cuff repair.     TONSILLECTOMY      UPPER GASTROINTESTINAL ENDOSCOPY N/A 2024    ESOPHAGOGASTRODUODENOSCOPY performed by Salena Hebert MD at [facility] ENDOSCOPY    UPPER GASTROINTESTINAL ENDOSCOPY N/A 2024    ESOPHAGOGASTRODUODENOSCOPY w/bx performed by Vik Parks MD at [facility] ENDOSCOPY         CURRENTMEDICATIONS       Previous Medications    ALBUTEROL SULFATE HFA

## (undated) DEVICE — GARMENT COMPR M FOR 13IN FT INTMIT SGL BLDR HEM FORC II

## (undated) DEVICE — SOLUTION IRRIG 1000ML H2O STRL BLT

## (undated) DEVICE — SNARE POLYP SM W13MMXL240CM SHTH DIA2.4MM OVL FLX DISP

## (undated) DEVICE — GLOVE ORANGE PI 7 1/2   MSG9075

## (undated) DEVICE — ENDOSCOPY PUMP TUBING/ CAP SET: Brand: ERBE

## (undated) DEVICE — Device

## (undated) DEVICE — BANDAGE COMPR SELF ADH 5 YDX4 IN TAN STRL PREMIERPRO LF

## (undated) DEVICE — STERILE POLYISOPRENE POWDER-FREE SURGICAL GLOVES: Brand: PROTEXIS

## (undated) DEVICE — REM POLYHESIVE ADULT PATIENT RETURN ELECTRODE: Brand: VALLEYLAB

## (undated) DEVICE — HANDPIECE SET WITH HIGH FLOW TIP AND SUCTION TUBE: Brand: INTERPULSE

## (undated) DEVICE — Z DISCONTINUED NO SUB IDED PLATE DISP SPL TYP ERBE NESSY P FOR PSD-60

## (undated) DEVICE — SYR 10ML CTRL LR LCK NSAF LF --

## (undated) DEVICE — INTENDED FOR TISSUE SEPARATION, AND OTHER PROCEDURES THAT REQUIRE A SHARP SURGICAL BLADE TO PUNCTURE OR CUT.: Brand: BARD-PARKER ® CARBON RIB-BACK BLADES

## (undated) DEVICE — GUIDEPIN ORTH THRD HI PERF HD SIG

## (undated) DEVICE — STERILE POLYISOPRENE POWDER-FREE SURGICAL GLOVES WITH EMOLLIENT COATING: Brand: PROTEXIS

## (undated) DEVICE — GLOVE SURG SZ 75 L12IN FNGR THK79MIL GRN LTX FREE

## (undated) DEVICE — CANNULA NSL AD TBNG L14FT STD PVC O2 CRV CONN NONFLARED NSL

## (undated) DEVICE — SWAB CULT LIQ STUART AGR AERB MOD IN BRK SGL RAYON TIP PLAS 220099] BECTON DICKINSON MICRO]

## (undated) DEVICE — MOUTHPIECE ENDOSCP 20X27MM --

## (undated) DEVICE — BITE BLOCK ENDOSCP UNIV AD 6 TO 9.4 MM

## (undated) DEVICE — PIN DRL THRD HDLSS SIG

## (undated) DEVICE — TRAP SPEC COLL POLYP POLYSTYR --

## (undated) DEVICE — OPTIFOAM GENTLE SA, POSTOP, 4X12: Brand: MEDLINE

## (undated) DEVICE — GAUZE,SPONGE,4"X4",16PLY,STRL,LF,10/TRAY: Brand: MEDLINE

## (undated) DEVICE — GOWN,SIRUS,NONRNF,SETINSLV,2XL,18/CS: Brand: MEDLINE

## (undated) DEVICE — 3 BONE CEMENT MIXER: Brand: MIXEVAC

## (undated) DEVICE — LINER SUCT CANSTR 3000CC PLAS SFT PRE ASSEMB W/OUT TBNG W/

## (undated) DEVICE — KIT EVAC 0.13IN RECT TB DIA10FR 400CC PVC 3 SPR Y CONN DRN

## (undated) DEVICE — SOL IRR NACL 0.9% 500ML POUR --

## (undated) DEVICE — SINGLE PORT MANIFOLD: Brand: NEPTUNE 2

## (undated) DEVICE — MAJ-1414 SINGLE USE ADPATER BIOPSY VALV: Brand: SINGLE USE ADAPTOR BIOPSY VALVE

## (undated) DEVICE — PREP SKN CHLRAPRP APL 26ML STR --

## (undated) DEVICE — STRIP,CLOSURE,WOUND,MEDI-STRIP,1/2X4: Brand: MEDLINE

## (undated) DEVICE — PACK PROCEDURE SURG TOT KNEE CUST

## (undated) DEVICE — YANKAUER,SMOOTH HANDLE,HIGH CAPACITY: Brand: MEDLINE INDUSTRIES, INC.

## (undated) DEVICE — SYRINGE MED 50ML LUERSLIP TIP

## (undated) DEVICE — SUT ETHLN 3-0 18IN PS1 BLK --

## (undated) DEVICE — BASIN EMSIS 16OZ GRAPHITE PLAS KID SHP MOLD GRAD FOR ORAL

## (undated) DEVICE — SHEET,DRAPE,70X100,STERILE: Brand: MEDLINE

## (undated) DEVICE — GLOVE ORANGE PI 8 1/2   MSG9085

## (undated) DEVICE — PEEL-AWAY TOGA, X-LARGE: Brand: FLYTE

## (undated) DEVICE — KENDALL RADIOLUCENT FOAM MONITORING ELECTRODE RECTANGULAR SHAPE: Brand: KENDALL

## (undated) DEVICE — UNDERCAST PADDING: Brand: DEROYAL

## (undated) DEVICE — SOL INJ L R 1000ML BG --

## (undated) DEVICE — Z INACTIVE USE 2720250 BIT DRL 3.2X145MM

## (undated) DEVICE — SPONGE GZ W4XL4IN COT 12 PLY TYP VII WVN C FLD DSGN

## (undated) DEVICE — CATHETER SUCT TR FL TIP 14FR W/ O CTRL

## (undated) DEVICE — MEDI-VAC NON-CONDUCTIVE SUCTION TUBING: Brand: CARDINAL HEALTH

## (undated) DEVICE — HOOD, PEEL-AWAY: Brand: FLYTE

## (undated) DEVICE — SWAB CULT SGL AMIES W/O CHAR FOR THRT VAG SKIN HRT CULTSWAB

## (undated) DEVICE — BLADE SAW W13XL90MM 1.19MM PARA

## (undated) DEVICE — NEEDLE HYPO 22GA L1.5IN BLK S STL HUB POLYPR SHLD REG BVL

## (undated) DEVICE — GARMENT,MEDLINE,DVT,INT,CALF,MED, GEN2: Brand: MEDLINE

## (undated) DEVICE — BLADE SAW 1.19X20X90 MM FOR LG BNE

## (undated) DEVICE — Z DISCONTINUED USE 2429233 DRESSING FOAM W10XL10CM 5 LAYR SELF ADH VERSATILE SAFETAC

## (undated) DEVICE — 3.0MM PRECISION NEURO (MATCH HEAD)

## (undated) DEVICE — TUBING, SUCTION, 1/4" X 12', STRAIGHT: Brand: MEDLINE

## (undated) DEVICE — IMMOBILIZER KNEE UNIV L19IN FOR 12-24IN THGH FOAM T BAR

## (undated) DEVICE — FORCEPS BX L240CM JAW DIA2.4MM ORNG L CAP W/ NDL DISP RAD

## (undated) DEVICE — ZIMMER® STERILE DISPOSABLE TOURNIQUET CUFF WITH PROTECTIVE SLEEVE AND PLC, SINGLE PORT, SINGLE BLADDER, 34 IN. (86 CM)

## (undated) DEVICE — UNDERPAD INCONT W23XL36IN STD BLU POLYPR BK FLUF SFT

## (undated) DEVICE — SUTURE STRATAFIX SZ 1 L14IN ABSRB VLT L36MM MO-4 TAPERPOINT SXPD2B400

## (undated) DEVICE — SYRINGE MED 25GA 3ML L5/8IN SUBQ PLAS W/ DETACH NDL SFTY

## (undated) DEVICE — SPONGE LAP 18X18IN STRL -- 5/PK